# Patient Record
Sex: FEMALE | Race: WHITE | Employment: OTHER | URBAN - METROPOLITAN AREA
[De-identification: names, ages, dates, MRNs, and addresses within clinical notes are randomized per-mention and may not be internally consistent; named-entity substitution may affect disease eponyms.]

---

## 2017-01-04 ENCOUNTER — HOSPITAL ENCOUNTER (OUTPATIENT)
Dept: PHYSICAL THERAPY | Age: 67
Discharge: HOME | End: 2017-01-04
Admitting: ORTHOPAEDIC SURGERY

## 2017-01-04 DIAGNOSIS — M17.0 PRIMARY OSTEOARTHRITIS OF BOTH KNEES: ICD-10-CM

## 2017-01-11 ENCOUNTER — HOSPITAL ENCOUNTER (OUTPATIENT)
Dept: PHYSICAL THERAPY | Age: 67
Discharge: HOME | End: 2017-01-11
Admitting: ORTHOPAEDIC SURGERY

## 2017-01-11 DIAGNOSIS — M17.0 PRIMARY OSTEOARTHRITIS OF BOTH KNEES: ICD-10-CM

## 2017-01-17 ENCOUNTER — HOSPITAL ENCOUNTER (OUTPATIENT)
Dept: PHYSICAL THERAPY | Age: 67
Discharge: HOME | End: 2017-01-17
Admitting: ORTHOPAEDIC SURGERY

## 2017-01-17 DIAGNOSIS — M17.0 PRIMARY OSTEOARTHRITIS OF BOTH KNEES: ICD-10-CM

## 2017-01-26 ENCOUNTER — HOSPITAL ENCOUNTER (OUTPATIENT)
Dept: PHYSICAL THERAPY | Age: 67
Discharge: HOME | End: 2017-01-26
Admitting: ORTHOPAEDIC SURGERY

## 2017-01-26 DIAGNOSIS — M17.0 PRIMARY OSTEOARTHRITIS OF BOTH KNEES: ICD-10-CM

## 2017-01-31 ENCOUNTER — HOSPITAL ENCOUNTER (OUTPATIENT)
Dept: PHYSICAL THERAPY | Age: 67
Discharge: HOME | End: 2017-01-31
Admitting: ORTHOPAEDIC SURGERY

## 2017-01-31 DIAGNOSIS — M17.0 PRIMARY OSTEOARTHRITIS OF BOTH KNEES: ICD-10-CM

## 2017-03-27 ENCOUNTER — OFFICE VISIT (OUTPATIENT)
Dept: FAMILY MEDICINE CLINIC | Age: 67
End: 2017-03-27

## 2017-03-27 VITALS
TEMPERATURE: 98.3 F | DIASTOLIC BLOOD PRESSURE: 89 MMHG | WEIGHT: 211 LBS | HEART RATE: 70 BPM | RESPIRATION RATE: 16 BRPM | SYSTOLIC BLOOD PRESSURE: 132 MMHG | BODY MASS INDEX: 36.79 KG/M2

## 2017-03-27 DIAGNOSIS — F32.89 OTHER DEPRESSION: ICD-10-CM

## 2017-03-27 DIAGNOSIS — R53.82 CHRONIC FATIGUE: ICD-10-CM

## 2017-03-27 DIAGNOSIS — R06.83 SNORING: ICD-10-CM

## 2017-03-27 DIAGNOSIS — M79.7 FIBROMYALGIA: Primary | ICD-10-CM

## 2017-03-27 PROCEDURE — 99213 OFFICE O/P EST LOW 20 MIN: CPT | Performed by: FAMILY MEDICINE

## 2017-03-27 PROCEDURE — G8510 SCR DEP NEG, NO PLAN REQD: HCPCS | Performed by: FAMILY MEDICINE

## 2017-03-27 RX ORDER — DULOXETIN HYDROCHLORIDE 20 MG/1
20 CAPSULE, DELAYED RELEASE ORAL 2 TIMES DAILY
Qty: 180 CAPSULE | Refills: 1 | Status: SHIPPED | OUTPATIENT
Start: 2017-03-27 | End: 2018-03-08 | Stop reason: SDUPTHER

## 2017-03-27 RX ORDER — FLUTICASONE PROPIONATE 50 MCG
2 SPRAY, SUSPENSION (ML) NASAL DAILY
Qty: 3 BOTTLE | Refills: 3 | Status: SHIPPED | OUTPATIENT
Start: 2017-03-27 | End: 2018-03-08 | Stop reason: SDUPTHER

## 2017-03-27 RX ORDER — PREGABALIN 75 MG/1
75 CAPSULE ORAL 3 TIMES DAILY
Qty: 270 CAPSULE | Refills: 1 | Status: SHIPPED | OUTPATIENT
Start: 2017-03-27 | End: 2017-10-04 | Stop reason: SDUPTHER

## 2017-03-27 RX ORDER — OMEPRAZOLE 40 MG/1
CAPSULE, DELAYED RELEASE ORAL
Qty: 90 CAPSULE | Refills: 3 | Status: SHIPPED | OUTPATIENT
Start: 2017-03-27 | End: 2018-03-08 | Stop reason: SDUPTHER

## 2017-03-27 ASSESSMENT — ENCOUNTER SYMPTOMS
WHEEZING: 0
SHORTNESS OF BREATH: 0
COUGH: 0

## 2017-03-27 ASSESSMENT — PATIENT HEALTH QUESTIONNAIRE - PHQ9
1. LITTLE INTEREST OR PLEASURE IN DOING THINGS: 0
SUM OF ALL RESPONSES TO PHQ QUESTIONS 1-9: 0
2. FEELING DOWN, DEPRESSED OR HOPELESS: 0
SUM OF ALL RESPONSES TO PHQ9 QUESTIONS 1 & 2: 0

## 2017-03-28 ENCOUNTER — HOSPITAL ENCOUNTER (OUTPATIENT)
Dept: MAMMOGRAPHY | Age: 67
Discharge: HOME | End: 2017-03-28
Attending: FAMILY MEDICINE | Admitting: FAMILY MEDICINE

## 2017-03-28 DIAGNOSIS — Z12.31 ENCOUNTER FOR SCREENING MAMMOGRAM FOR BREAST CANCER: ICD-10-CM

## 2017-04-03 ENCOUNTER — TELEPHONE (OUTPATIENT)
Dept: FAMILY MEDICINE CLINIC | Age: 67
End: 2017-04-03

## 2017-04-04 DIAGNOSIS — L57.3 POIKILODERMA OF CIVATTE: Primary | ICD-10-CM

## 2017-04-27 ENCOUNTER — OFFICE VISIT (OUTPATIENT)
Dept: ORTHOPEDIC SURGERY | Age: 67
End: 2017-04-27

## 2017-04-27 ENCOUNTER — HOSPITAL ENCOUNTER (OUTPATIENT)
Dept: PHYSICAL THERAPY | Age: 67
Discharge: HOME | End: 2017-04-30
Admitting: ORTHOPAEDIC SURGERY

## 2017-04-27 VITALS
DIASTOLIC BLOOD PRESSURE: 80 MMHG | BODY MASS INDEX: 36.02 KG/M2 | WEIGHT: 211 LBS | HEART RATE: 78 BPM | HEIGHT: 64 IN | SYSTOLIC BLOOD PRESSURE: 136 MMHG

## 2017-04-27 DIAGNOSIS — Z96.651 STATUS POST TOTAL RIGHT KNEE REPLACEMENT: Primary | ICD-10-CM

## 2017-04-27 DIAGNOSIS — M17.0 PRIMARY OSTEOARTHRITIS OF BOTH KNEES: ICD-10-CM

## 2017-04-27 PROCEDURE — 99213 OFFICE O/P EST LOW 20 MIN: CPT | Performed by: ORTHOPAEDIC SURGERY

## 2017-07-19 ENCOUNTER — INITIAL CONSULT (OUTPATIENT)
Dept: PULMONOLOGY | Age: 67
End: 2017-07-19

## 2017-07-19 VITALS
OXYGEN SATURATION: 97 % | HEIGHT: 63 IN | BODY MASS INDEX: 37.39 KG/M2 | SYSTOLIC BLOOD PRESSURE: 118 MMHG | DIASTOLIC BLOOD PRESSURE: 82 MMHG | HEART RATE: 45 BPM | WEIGHT: 211 LBS

## 2017-07-19 DIAGNOSIS — E66.9 NON MORBID OBESITY, UNSPECIFIED OBESITY TYPE: Chronic | ICD-10-CM

## 2017-07-19 DIAGNOSIS — M79.7 FIBROMYALGIA: Chronic | ICD-10-CM

## 2017-07-19 DIAGNOSIS — F32.89 OTHER DEPRESSION: Chronic | ICD-10-CM

## 2017-07-19 DIAGNOSIS — J30.2 SEASONAL ALLERGIC RHINITIS, UNSPECIFIED ALLERGIC RHINITIS TRIGGER: Chronic | ICD-10-CM

## 2017-07-19 DIAGNOSIS — R06.83 SNORING: ICD-10-CM

## 2017-07-19 DIAGNOSIS — G47.10 HYPERSOMNIA: Primary | ICD-10-CM

## 2017-07-19 PROCEDURE — 99204 OFFICE O/P NEW MOD 45 MIN: CPT | Performed by: INTERNAL MEDICINE

## 2017-07-19 ASSESSMENT — ENCOUNTER SYMPTOMS
SHORTNESS OF BREATH: 0
APNEA: 1
ALLERGIC/IMMUNOLOGIC NEGATIVE: 1
CHEST TIGHTNESS: 0
ABDOMINAL PAIN: 0
EYE PAIN: 0
ABDOMINAL DISTENTION: 0
PHOTOPHOBIA: 0
NAUSEA: 0
RHINORRHEA: 0
CHOKING: 0
VOMITING: 0

## 2017-07-19 ASSESSMENT — SLEEP AND FATIGUE QUESTIONNAIRES
HOW LIKELY ARE YOU TO NOD OFF OR FALL ASLEEP WHILE SITTING INACTIVE IN A PUBLIC PLACE: 0
HOW LIKELY ARE YOU TO NOD OFF OR FALL ASLEEP WHEN YOU ARE A PASSENGER IN A CAR FOR AN HOUR WITHOUT A BREAK: 0
HOW LIKELY ARE YOU TO NOD OFF OR FALL ASLEEP WHILE LYING DOWN TO REST IN THE AFTERNOON WHEN CIRCUMSTANCES PERMIT: 2
HOW LIKELY ARE YOU TO NOD OFF OR FALL ASLEEP WHILE SITTING QUIETLY AFTER LUNCH WITHOUT ALCOHOL: 2
NECK CIRCUMFERENCE (INCHES): 15
HOW LIKELY ARE YOU TO NOD OFF OR FALL ASLEEP WHILE SITTING AND TALKING TO SOMEONE: 0
ESS TOTAL SCORE: 7
HOW LIKELY ARE YOU TO NOD OFF OR FALL ASLEEP IN A CAR, WHILE STOPPED FOR A FEW MINUTES IN TRAFFIC: 0
HOW LIKELY ARE YOU TO NOD OFF OR FALL ASLEEP WHILE WATCHING TV: 2
HOW LIKELY ARE YOU TO NOD OFF OR FALL ASLEEP WHILE SITTING AND READING: 1

## 2017-08-09 ENCOUNTER — HOSPITAL ENCOUNTER (OUTPATIENT)
Dept: SLEEP MEDICINE | Age: 67
Discharge: HOME | End: 2017-08-11

## 2017-08-09 ENCOUNTER — TELEPHONE (OUTPATIENT)
Dept: FAMILY MEDICINE CLINIC | Age: 67
End: 2017-08-09

## 2017-08-09 DIAGNOSIS — R06.83 SNORING: ICD-10-CM

## 2017-08-09 DIAGNOSIS — G47.10 HYPERSOMNIA: ICD-10-CM

## 2017-08-09 PROCEDURE — 95810 POLYSOM 6/> YRS 4/> PARAM: CPT | Performed by: INTERNAL MEDICINE

## 2017-08-14 ENCOUNTER — OFFICE VISIT (OUTPATIENT)
Dept: FAMILY MEDICINE CLINIC | Age: 67
End: 2017-08-14

## 2017-08-14 VITALS
DIASTOLIC BLOOD PRESSURE: 88 MMHG | HEART RATE: 74 BPM | TEMPERATURE: 98.3 F | SYSTOLIC BLOOD PRESSURE: 142 MMHG | WEIGHT: 210 LBS | BODY MASS INDEX: 37.8 KG/M2 | RESPIRATION RATE: 16 BRPM

## 2017-08-14 DIAGNOSIS — M79.7 FIBROMYALGIA: ICD-10-CM

## 2017-08-14 DIAGNOSIS — M17.12 PRIMARY OSTEOARTHRITIS OF LEFT KNEE: Primary | ICD-10-CM

## 2017-08-14 PROCEDURE — 99213 OFFICE O/P EST LOW 20 MIN: CPT | Performed by: FAMILY MEDICINE

## 2017-08-14 ASSESSMENT — ENCOUNTER SYMPTOMS
COUGH: 0
SHORTNESS OF BREATH: 0
WHEEZING: 0

## 2017-08-15 ENCOUNTER — TELEPHONE (OUTPATIENT)
Dept: SLEEP MEDICINE | Age: 67
End: 2017-08-15

## 2017-08-21 ENCOUNTER — TELEPHONE (OUTPATIENT)
Dept: SLEEP MEDICINE | Age: 67
End: 2017-08-21

## 2017-08-21 DIAGNOSIS — G47.33 OBSTRUCTIVE SLEEP APNEA (ADULT) (PEDIATRIC): Primary | ICD-10-CM

## 2017-08-24 ENCOUNTER — OFFICE VISIT (OUTPATIENT)
Dept: ORTHOPEDIC SURGERY | Age: 67
End: 2017-08-24

## 2017-08-24 VITALS
DIASTOLIC BLOOD PRESSURE: 82 MMHG | BODY MASS INDEX: 35.44 KG/M2 | WEIGHT: 200 LBS | HEIGHT: 63 IN | HEART RATE: 82 BPM | SYSTOLIC BLOOD PRESSURE: 122 MMHG

## 2017-08-24 DIAGNOSIS — M25.562 LEFT KNEE PAIN, UNSPECIFIED CHRONICITY: Primary | ICD-10-CM

## 2017-08-24 DIAGNOSIS — M17.12 PRIMARY OSTEOARTHRITIS OF LEFT KNEE: ICD-10-CM

## 2017-08-24 DIAGNOSIS — Z96.651 HISTORY OF TOTAL KNEE REPLACEMENT, RIGHT: ICD-10-CM

## 2017-08-24 PROCEDURE — 99214 OFFICE O/P EST MOD 30 MIN: CPT | Performed by: ORTHOPAEDIC SURGERY

## 2017-08-24 PROCEDURE — 73562 X-RAY EXAM OF KNEE 3: CPT | Performed by: ORTHOPAEDIC SURGERY

## 2017-08-29 ENCOUNTER — TELEPHONE (OUTPATIENT)
Dept: SLEEP MEDICINE | Age: 67
End: 2017-08-29

## 2017-09-05 ENCOUNTER — TELEPHONE (OUTPATIENT)
Dept: SLEEP MEDICINE | Age: 67
End: 2017-09-05

## 2017-09-05 ENCOUNTER — HOSPITAL ENCOUNTER (OUTPATIENT)
Dept: MRI IMAGING | Age: 67
Discharge: HOME | End: 2017-09-05
Attending: ORTHOPAEDIC SURGERY | Admitting: ORTHOPAEDIC SURGERY

## 2017-09-05 DIAGNOSIS — M17.12 PRIMARY OSTEOARTHRITIS OF LEFT KNEE: ICD-10-CM

## 2017-09-05 DIAGNOSIS — M17.12 ARTHRITIS OF KNEE, LEFT: ICD-10-CM

## 2017-09-11 ENCOUNTER — OFFICE VISIT (OUTPATIENT)
Dept: FAMILY MEDICINE CLINIC | Age: 67
End: 2017-09-11

## 2017-09-11 VITALS
WEIGHT: 213.2 LBS | DIASTOLIC BLOOD PRESSURE: 79 MMHG | RESPIRATION RATE: 16 BRPM | OXYGEN SATURATION: 98 % | HEIGHT: 63 IN | BODY MASS INDEX: 37.78 KG/M2 | TEMPERATURE: 98.1 F | HEART RATE: 74 BPM | SYSTOLIC BLOOD PRESSURE: 118 MMHG

## 2017-09-11 DIAGNOSIS — Z01.818 PREOP EXAMINATION: ICD-10-CM

## 2017-09-11 DIAGNOSIS — L03.112 CELLULITIS OF LEFT AXILLA: ICD-10-CM

## 2017-09-11 DIAGNOSIS — M25.562 CHRONIC PAIN OF LEFT KNEE: Primary | ICD-10-CM

## 2017-09-11 DIAGNOSIS — G89.29 CHRONIC PAIN OF LEFT KNEE: Primary | ICD-10-CM

## 2017-09-11 LAB
A/G RATIO: 1.6 (ref 1.1–2.2)
ALBUMIN SERPL-MCNC: 4.2 G/DL (ref 3.4–5)
ALP BLD-CCNC: 71 U/L (ref 40–129)
ALT SERPL-CCNC: 21 U/L (ref 10–40)
ANION GAP SERPL CALCULATED.3IONS-SCNC: 17 MMOL/L (ref 3–16)
AST SERPL-CCNC: 26 U/L (ref 15–37)
BASOPHILS ABSOLUTE: 0.1 K/UL (ref 0–0.2)
BASOPHILS RELATIVE PERCENT: 1.2 %
BILIRUB SERPL-MCNC: 0.5 MG/DL (ref 0–1)
BUN BLDV-MCNC: 14 MG/DL (ref 7–20)
CALCIUM SERPL-MCNC: 9.7 MG/DL (ref 8.3–10.6)
CHLORIDE BLD-SCNC: 104 MMOL/L (ref 99–110)
CO2: 20 MMOL/L (ref 21–32)
CREAT SERPL-MCNC: 0.6 MG/DL (ref 0.6–1.2)
EOSINOPHILS ABSOLUTE: 0.2 K/UL (ref 0–0.6)
EOSINOPHILS RELATIVE PERCENT: 3.7 %
GFR AFRICAN AMERICAN: >60
GFR NON-AFRICAN AMERICAN: >60
GLOBULIN: 2.7 G/DL
GLUCOSE BLD-MCNC: 96 MG/DL (ref 70–99)
HCT VFR BLD CALC: 41.6 % (ref 36–48)
HEMOGLOBIN: 13.5 G/DL (ref 12–16)
LYMPHOCYTES ABSOLUTE: 1.6 K/UL (ref 1–5.1)
LYMPHOCYTES RELATIVE PERCENT: 29.2 %
MCH RBC QN AUTO: 28.6 PG (ref 26–34)
MCHC RBC AUTO-ENTMCNC: 32.3 G/DL (ref 31–36)
MCV RBC AUTO: 88.4 FL (ref 80–100)
MONOCYTES ABSOLUTE: 0.5 K/UL (ref 0–1.3)
MONOCYTES RELATIVE PERCENT: 9.3 %
NEUTROPHILS ABSOLUTE: 3 K/UL (ref 1.7–7.7)
NEUTROPHILS RELATIVE PERCENT: 56.6 %
PDW BLD-RTO: 14.6 % (ref 12.4–15.4)
PLATELET # BLD: 277 K/UL (ref 135–450)
PMV BLD AUTO: 8.9 FL (ref 5–10.5)
POTASSIUM SERPL-SCNC: 5.3 MMOL/L (ref 3.5–5.1)
RBC # BLD: 4.71 M/UL (ref 4–5.2)
SODIUM BLD-SCNC: 141 MMOL/L (ref 136–145)
TOTAL PROTEIN: 6.9 G/DL (ref 6.4–8.2)
WBC # BLD: 5.3 K/UL (ref 4–11)

## 2017-09-11 PROCEDURE — 99214 OFFICE O/P EST MOD 30 MIN: CPT | Performed by: NURSE PRACTITIONER

## 2017-09-11 PROCEDURE — 93000 ELECTROCARDIOGRAM COMPLETE: CPT | Performed by: NURSE PRACTITIONER

## 2017-09-11 RX ORDER — SULFAMETHOXAZOLE AND TRIMETHOPRIM 800; 160 MG/1; MG/1
1 TABLET ORAL 2 TIMES DAILY
Qty: 20 TABLET | Refills: 0 | Status: SHIPPED | OUTPATIENT
Start: 2017-09-11 | End: 2017-09-21

## 2017-09-11 ASSESSMENT — ENCOUNTER SYMPTOMS
GASTROINTESTINAL NEGATIVE: 1
NAUSEA: 0
RESPIRATORY NEGATIVE: 1
EYES NEGATIVE: 1
DIARRHEA: 0
TROUBLE SWALLOWING: 0
COUGH: 0
COLOR CHANGE: 1
WHEEZING: 0
EYE ITCHING: 0
ABDOMINAL PAIN: 0
CHEST TIGHTNESS: 0
CONSTIPATION: 0
SINUS PRESSURE: 0
SHORTNESS OF BREATH: 0
EYE REDNESS: 0
SORE THROAT: 0

## 2017-09-13 DIAGNOSIS — M17.12 PRIMARY OSTEOARTHRITIS OF LEFT KNEE: Primary | ICD-10-CM

## 2017-09-21 ENCOUNTER — HOSPITAL ENCOUNTER (OUTPATIENT)
Dept: SLEEP MEDICINE | Age: 67
Discharge: HOME | End: 2017-08-26

## 2017-09-22 ENCOUNTER — TELEPHONE (OUTPATIENT)
Dept: ORTHOPEDIC SURGERY | Age: 67
End: 2017-09-22

## 2017-10-02 ENCOUNTER — HOSPITAL ENCOUNTER (OUTPATIENT)
Dept: PREADMISSION TESTING | Age: 67
Discharge: HOME | End: 2017-10-02
Admitting: ORTHOPAEDIC SURGERY

## 2017-10-02 VITALS
SYSTOLIC BLOOD PRESSURE: 128 MMHG | DIASTOLIC BLOOD PRESSURE: 83 MMHG | HEART RATE: 69 BPM | WEIGHT: 219 LBS | OXYGEN SATURATION: 98 % | RESPIRATION RATE: 16 BRPM | BODY MASS INDEX: 38.8 KG/M2 | TEMPERATURE: 97.3 F | HEIGHT: 63 IN

## 2017-10-02 LAB
ALBUMIN SERPL-MCNC: 3.8 G/DL (ref 3.4–5)
ALP BLD-CCNC: 78 U/L (ref 40–129)
ALT SERPL-CCNC: 24 U/L (ref 10–40)
ANION GAP SERPL CALCULATED.3IONS-SCNC: 15 MMOL/L (ref 3–16)
APTT: 29.5 SEC (ref 24.1–34.9)
AST SERPL-CCNC: 21 U/L (ref 15–37)
BILIRUB SERPL-MCNC: <0.2 MG/DL (ref 0–1)
BILIRUBIN DIRECT: <0.2 MG/DL (ref 0–0.3)
BILIRUBIN, INDIRECT: NORMAL MG/DL (ref 0–1)
BUN BLDV-MCNC: 13 MG/DL (ref 7–20)
CALCIUM SERPL-MCNC: 9.1 MG/DL (ref 8.3–10.6)
CHLORIDE BLD-SCNC: 102 MMOL/L (ref 99–110)
CO2: 22 MMOL/L (ref 21–32)
CREAT SERPL-MCNC: 0.6 MG/DL (ref 0.6–1.2)
GFR AFRICAN AMERICAN: >60
GFR NON-AFRICAN AMERICAN: >60
GLUCOSE BLD-MCNC: 105 MG/DL (ref 70–99)
HCT VFR BLD CALC: 38.3 % (ref 36–48)
HEMOGLOBIN: 12.6 G/DL (ref 12–16)
INR BLD: 0.91 (ref 0.85–1.15)
MCH RBC QN AUTO: 28.3 PG (ref 26–34)
MCHC RBC AUTO-ENTMCNC: 32.8 G/DL (ref 31–36)
MCV RBC AUTO: 86.3 FL (ref 80–100)
PDW BLD-RTO: 14.2 % (ref 12.4–15.4)
PLATELET # BLD: 292 K/UL (ref 135–450)
PMV BLD AUTO: 8.7 FL (ref 5–10.5)
POTASSIUM SERPL-SCNC: 4.3 MMOL/L (ref 3.5–5.1)
PROTHROMBIN TIME: 10.3 SEC (ref 9.6–13)
RBC # BLD: 4.44 M/UL (ref 4–5.2)
SODIUM BLD-SCNC: 139 MMOL/L (ref 136–145)
TOTAL PROTEIN: 6.7 G/DL (ref 6.4–8.2)
WBC # BLD: 4.7 K/UL (ref 4–11)

## 2017-10-02 RX ORDER — CHLORHEXIDINE GLUCONATE 0.12 MG/ML
15 RINSE ORAL 2 TIMES DAILY
Status: CANCELLED | OUTPATIENT
Start: 2017-10-08

## 2017-10-02 NOTE — PROGRESS NOTES
[address]                          Date of Procedure 10-9 Time of Procedure 0800    PRIOR TO PROCEDURE DATE:  1. Please follow any guidelines/instructions prior to your procedure as advised by your surgeon. 2. Arrange for someone to drive you home and be with you for the first 24 hours after discharge for your safety after your procedure for which you received sedation. Ensure it is someone we can share information with regarding your discharge. 3. You must contact your surgeon for instructions IF:   You are taking any blood thinners, aspirin, anti-inflammatory or vitamin E.   There is a change in your physical condition such as a cold, fever, rash, cuts, sores or any other infection, especially near your surgical site. 4. Do not drink alcohol the day before or day of your procedure. 5. A Pre-op History and Physical for surgery MUST be completed by your Physician or Urgent Care within 30 days of your procedure date. Please bring a copy with you on the day of your procedure and along with any other testing performed. THE DAY OF YOUR PROCEDURE:  1. Follow instructions for ARRIVAL TIME as DIRECTED BY YOUR SURGEON. If your surgeon does not give you a specific arrival time, please arrive at 0600     2. Enter the MAIN entrance from [employer] and follow the signs to the free BPT or [employer] parking (offered free of charge 6am-5pm). 3. Enter the Main Entrance of the hospital (do NOT enter from the lower level of the parking garage). Upon entrance, check in with the  at the main desk on your left. If no one is available at the desk, proceed into the [facility] Waiting Room and go through the door directly into the [facility]. There is a Check-in desk ACROSS from Room 5 (marked with a sign hanging from the ceiling).  The phone number for the surgery center is [phone].    4. DO NOT EAT OR DRINK ANYTHING AFTER MIDNIGHT occur after anesthesia. Should any of these symptoms become severe, or should you notice any signs of infection, you should call your surgeon. 5. Narcotic pain medications can cause significant constipation. You may want to add a stool softener to your postoperative medication schedule or speak to your surgeon on how best to manage this SIDE EFFECT. SPECIAL INSTRUCTIONS     Thank you for allowing us to care for you. We strive to exceed your expectations in the overall delivery of care and service provided to you and your family. If you need to contact us for any reason, please call us at [phone]    Instructions reviewed and copy given to patient during preadmission testing visit. Nida Pelaez. 10/2/2017 .10:26 AM      ADDITIONAL EDUCATIONAL INFORMATION REVIEWED / PROVIDED TO YOU AND YOUR FAMILY:  Yes Taking Control of Your Pain   Yes FAQs about Surgical Site Infections  No Cardiac Surgery Instructions for AM admission to the hospital  No Learning About Preventing Pressure Sores  No Cardiac Surgery Preoperative Hibiclens® Bathing Instructions  No Your Care after Heart Surgery Binder  no Your Guide to Hip Replacement Surgery. PLEASE BRING THIS BOOKLET BACK ON THE DAY OF YOUR SURGERY. Yes Your Guide to Knee Replacement Surgery. PLEASE BRING THIS BOOKLET BACK ON THE DAY OF YOUR SURGERY. No Your Guide to Shoulder Replacement Surgery. PLEASE BRING THIS BOOKLET BACK ON THE DAY OF YOUR SURGERY.     Yes Hibiclens® Bathing Instructions   Yes Incentive Spirometer given to patient- PLEASE BRING THIS SPIROMETER BACK WITH YOU ON THE DAY OF YOUR SURGERY  No CMS Comprehensive Care for Joint Replacement Model Notification Letter  No Other

## 2017-10-03 LAB — MRSA SCREEN RT-PCR: NORMAL

## 2017-10-04 ENCOUNTER — TELEPHONE (OUTPATIENT)
Dept: FAMILY MEDICINE CLINIC | Age: 67
End: 2017-10-04

## 2017-10-04 RX ORDER — PREGABALIN 75 MG/1
75 CAPSULE ORAL 3 TIMES DAILY
Qty: 270 CAPSULE | Refills: 1 | Status: ON HOLD | OUTPATIENT
Start: 2017-10-04 | End: 2017-10-09 | Stop reason: SDUPTHER

## 2017-10-05 ENCOUNTER — HOSPITAL ENCOUNTER (OUTPATIENT)
Dept: PHYSICAL THERAPY | Age: 67
Discharge: HOME | End: 2017-10-31
Admitting: ORTHOPAEDIC SURGERY

## 2017-10-05 ENCOUNTER — OFFICE VISIT (OUTPATIENT)
Dept: ORTHOPEDIC SURGERY | Age: 67
End: 2017-10-05

## 2017-10-05 VITALS
BODY MASS INDEX: 40.3 KG/M2 | HEIGHT: 62 IN | WEIGHT: 219 LBS | HEART RATE: 71 BPM | SYSTOLIC BLOOD PRESSURE: 147 MMHG | DIASTOLIC BLOOD PRESSURE: 89 MMHG

## 2017-10-05 DIAGNOSIS — M17.12 PRIMARY OSTEOARTHRITIS OF LEFT KNEE: ICD-10-CM

## 2017-10-05 DIAGNOSIS — M17.12 OSTEOARTHRITIS OF LEFT KNEE, UNSPECIFIED OSTEOARTHRITIS TYPE: Primary | ICD-10-CM

## 2017-10-05 PROCEDURE — 99213 OFFICE O/P EST LOW 20 MIN: CPT | Performed by: ORTHOPAEDIC SURGERY

## 2017-10-05 PROCEDURE — MISC250 COMPRESSION STOCKING: Performed by: ORTHOPAEDIC SURGERY

## 2017-10-05 RX ORDER — HYDROCODONE BITARTRATE AND ACETAMINOPHEN 5; 325 MG/1; MG/1
2 TABLET ORAL EVERY 6 HOURS PRN
Qty: 40 TABLET | Refills: 0 | Status: SHIPPED | OUTPATIENT
Start: 2017-10-05 | End: 2017-10-12

## 2017-10-05 NOTE — PLAN OF CARE
[facility]                                                       Physical Therapy Certification    Dear Johan Powell MD,    We had the pleasure of evaluating the following patient for physical therapy services at [address] [place] [address]. A summary of our findings can be found in the initial assessment below. This includes our plan of care. If you have any questions or concerns regarding these findings, please do not hesitate to contact me at the office phone number checked above. Thank you for the referral.       Physician Signature:_______________________________Date:__________________  By signing above (or electronic signature), therapists plan is approved by physician      Patient: Margie Dey   : 1950   MRN: [id number]  Referring Physician: Referring Practitioner: Johan Powell MD      Evaluation Date: 10/5/2017      Medical Diagnosis Information:  Diagnosis: M17.12 (ICD-10-CM) - Primary osteoarthritis of left knee   Treatment Diagnosis: Knee pain/ swelling/ difficulty walking/ limited ROM/ LE muscle weakness  Preop for L knee TKR  DOS: 10/9/17                                           Insurance information: PT Insurance Information: [employer]     Precautions/ Contra-indications: Fibromyalgia; R TKR 2016; sleep apnea (using CPAP)  Latex Allergy:  [x]NO      []YES  Preferred Language for Healthcare:   [x]English       []other:    SUBJECTIVE: Margie Dey is a 74 y.o. female who presents for preop assessment  of her left knee. The patient does have primary*arthritis of the left knee and has failed all conservative treatment protocol including physical therapy, oral anti-inflammatories, injections and modifying her activities. She is currently scheduled to undergo a left total knee arthroplasty. The patient is in good health.  The patient has [x]Dermatomes/Myotomes intact    [x]Reflexes equal and normal bilaterally   []Other:    Joint mobility:    [x]Normal    []Hypo   []Hyper    Palpation: MJL    Functional Mobility/Transfers: Independent from floor to table    Posture: Mild varus; prior L short (had used heel lift short term before changing shoe style)    Bandages/Dressings/Incisions: L knee Ascope portals healed    Gait: Preop FWB with mild antalgic gait due to medial compartment pain; slight decreased stance phase due to pain    TEST INITIAL  10-5-16 FOLLOW  UP GOAL   SINGLE LEG SQUAT TEST L: Poor (-)    R: Poor (-)  NO KNEE VALGUS, MEDIAL/LATERAL MOVEMENT, OR PELVIC TILT  GOOD, FAIR, POOR   6 MINUTE WALK TEST 0.15 miles                      M             F          60-70 y/o: >.31mile,  >.30mile  66-78 y/o: >.28 mile, >.26 mile  80-81 y/o: >.21mile,  >.20 mile   STAIR CLIMBING TEST 12.24 sec  < 13 SEC (M&F)     Score Sheet for Y Balance Test  10-5-17   Left  Right  Difference  Comments    Anterior  48 60 12    Posteromedial  96 92 +4    Posterolateral  80 80 0           Leg Length        **Difference should be less than 4 cm for return to sport and preparticipation screening (<10% deficit compared to uninvolved)**  (Star Excursion Balance Test as a Predictor of Lower Extremity Injury in [employer] Players)                           [x] Patient history, allergies, meds reviewed. Medical chart reviewed. See intake form. Review Of Systems (ROS):  [x]Performed Review of systems (Integumentary, CardioPulmonary, Neurological) by intake and observation. Intake form has been scanned into medical record. Patient has been instructed to contact their primary care physician regarding ROS issues if not already being addressed at this time.       Co-morbidities/Complexities (which will affect course of rehabilitation):   []None           Arthritic conditions   []Rheumatoid arthritis (M05.9)  [x]Osteoarthritis (M19.91)   Cardiovascular conditions []Hypertension (I10)  []Hyperlipidemia (E78.5)  []Angina pectoris (I20)  []Atherosclerosis (I70)   Musculoskeletal conditions   []Disc pathology   []Congenital spine pathologies   [x]Prior surgical intervention  []Osteoporosis (M81.8)  []Osteopenia (M85.8)   Endocrine conditions   []Hypothyroid (E03.9)  []Hyperthyroid Gastrointestinal conditions   []Constipation (V12.77)   Metabolic conditions   []Morbid obesity (E66.01)  []Diabetes type 1(E10.65) or 2 (E11.65)   []Neuropathy (G60.9)     Pulmonary conditions   []Asthma (J45)  []Coughing   []COPD (J44.9)   Psychological Disorders  [x]Anxiety (F41.9)  [x]Depression (F32.9)   []Other:   [x]Other:    *Overweight  *Fibromyalgia        Barriers to/and or personal factors that will affect rehab potential:              []Age  []Sex              []Motivation/Lack of Motivation                        [x]Co-Morbidities              []Cognitive Function, education/learning barriers              []Environmental, home barriers              []profession/work barriers  []past PT/medical experience  []other:  Justification: Significant issues related to complex rehab process after L knee TKR. Continues with R knee progression    Falls Risk Assessment (30 days):   [x] Falls Risk assessed and no intervention required.   [] Falls Risk assessed and Patient requires intervention due to being higher risk   TUG score (>12s at risk):     [] Falls education provided, including         ASSESSMENT:   Functional Impairments:     [x]Noted lumbar/proximal hip/LE hypomobility   [x]Decreased LE functional ROM   [x]Decreased core/proximal hip strength and neuromuscular control   [x]Decreased LE functional strength   [x]Reduced balance/proprioceptive control   []other:      Functional Activity Limitations (from functional questionnaire and intake)   [x]Reduced ability to tolerate prolonged functional positions   [x]Reduced ability or difficulty with changes of positions or transfers between positions   [x]Reduced ability to maintain good posture and demonstrate good body mechanics with sitting, bending, and lifting   []Reduced ability to sleep   [] Reduced ability or tolerance with driving and/or computer work   [x]Reduced ability to perform lifting, carrying tasks   [x]Reduced ability to squat   [x]Reduced ability to forward bend   [x]Reduced ability to ambulate prolonged functional periods/distances/surfaces   [x]Reduced ability to ascend/descend stairs   [x]Reduced ability to run, hop or jump   []other:     Participation Restrictions   []Reduced participation in self care activities   [x]Reduced participation in home management activities   [x]Reduced participation in work activities   [x]Reduced participation in social activities. [x]Reduced participation in sport activities. Classification :    [x]Signs/symptoms consistent with post-surgical status including decreased ROM, strength and function. []Signs/symptoms consistent with joint sprain/strain   [x]Signs/symptoms consistent with patella-femoral syndrome   [x]Signs/symptoms consistent with knee OA/hip OA   []Signs/symptoms consistent with internal derangement of knee/Hip   [x]Signs/symptoms consistent with functional hip weakness/NMR control      []Signs/symptoms consistent with tendinitis/tendinosis    []signs/symptoms consistent with pathology which may benefit from Dry needling      []other:      Prognosis/Rehab Potential:      []Excellent   [x]Good    []Fair   []Poor    Tolerance of evaluation/treatment:    []Excellent   [x]Good    [x]Fair: Good tolerance to initial discussion/ table measurements, however, noted moderate functional limitations with WB flexion testing. Functional ADL limitations include, but not limited to knee pain/ swelling/ difficulty walking/ limited ROM/ LE muscle weakness. Full discussion and written instructions provided for preop appointment.    []Poor    Physical Therapy Evaluation Complexity

## 2017-10-05 NOTE — MR AVS SNAPSHOT
guidelines. However these guidelines can be individualized by your provider. 3/28/2019    Cholesterol Screening 4/3/2020            MyChart Signup           Our records indicate that you have an active [employer] account. You can view your After Visit Summary by going to [email or web address] and logging in with your [employer] username and password. If you don't have a [employer] username and password but a parent or guardian has access to your record, the parent or guardian should login with their own [employer] username and password and access your record to view the After Visit Summary. Additional Information  If you have questions, please contact the physician practice where you receive care. Remember, [employer] is NOT to be used for urgent needs. For medical emergencies, dial 911. For questions regarding your [employer] account call [phone]. If you have a clinical question, please call your doctor's office.

## 2017-10-05 NOTE — PROGRESS NOTES
Review of Systems   HENT:        Sinus     Gastrointestinal:        Hemorrhoids  Reflux     Musculoskeletal: Positive for joint pain. Neurological:        Chronic  Fibromyalgia     Psychiatric/Behavioral: Positive for depression. All other systems reviewed and are negative.

## 2017-10-05 NOTE — PROGRESS NOTES
Chief Complaint    Follow-up (surg disc for left TKR sx )      History of Present Illness:  James Preciado is a 74 y.o. female who presents for follow up of her left knee. The patient does have primary*arthritis of the left knee and has failed all conservative treatment protocol including physical therapy, oral anti-inflammatories, injections and modifying her activities. She is currently scheduled to undergo a left total knee arthroplasty. The patient is in good health. The patient has no history of blood clots, no organ dysfunction, not diabetes, no known allergies to medications, tolerates pain medications and is not on any estrogen products. The patient recently had a physical from her PCP and was cleared for surgery and stated being in good health. Past Medical History:   Diagnosis Date    Anxiety     Arthritis     knee    Back pain     Depression     Fibromyalgia     Hemorrhoids     Obesity     PONV (postoperative nausea and vomiting)     Reflux     Sleep apnea     uses CPAP    TMJ syndrome     Vision impairment     glasses        Past Surgical History:   Procedure Laterality Date    BREAST SURGERY      reduction    BROW LIFT Bilateral 11/12/13    FINGER TRIGGER RELEASE  12/9/11    left thumb and cyst    FINGER TRIGGER RELEASE Bilateral     4th finger    FINGER TRIGGER RELEASE Right 7/6/2015    index and long    FINGER TRIGGER RELEASE Left 2/22/16    FOOT SURGERY  1980's    Bilat.  feet Soundra Marker shaved\"    KNEE ARTHROSCOPY  11-    Left knee video arthroscopy, partial medial and lateral meniscectomy    KNEE SURGERY  3/17/2011    Video arthroscopy withpartial medial menisectomy rt. knee    MOUTH SURGERY      OTHER SURGICAL HISTORY Left     dequervains    TOTAL KNEE ARTHROPLASTY Right 11/09/2016    TUBAL LIGATION         Family History   Problem Relation Age of Onset    Cancer Father     Diabetes Sister     Other Sister      CHELSI    Stroke Mother     Cancer Brother kg/m2    General Exam:   Mental Status: The patient is oriented to time, place and person. The patient's mood and affect are appropriate. Neurological: The patient has good coordination. There is no weakness or sensory deficit. Knee Examination:left    Skin:  There are no skin lesions, cellulitis, or extreme edema in the lower extremities. Sensation is grossly intact to light touch bilaterally lower extremity. The patient has warm and well-perfused Bilateral lower extremities with brisk capillary refill. Inspection:  Mild effusion,  no gross deformities, no signs of infection. Palpation: There is patellofemoral crepitus, there is joint line tenderness    Range of Motion:  8-120    Strength: Motor is grossly intact    Special Tests:   No ligament instability     Gait: Non antalgic    Alignment: Varus deformity    Radiology:     X-rays obtained and reviewed in office: none   COMPRESSION STOCKING        Office Procedures:  Orders Placed This Encounter   Procedures    Compression Stocking $30     Patient was supplied a Compression Sleeve. This retail item was supplied to provide functional support and assist in controlling swelling in the lower extremities. Verbal and written instructions for the use of and application of this item were provided. The patient was educated and fit by a healthcare professional with expert knowledge and specialization in brace application. They were instructed to contact the office immediately should the equipment result in increased pain, decreased sensation, increased swelling or worsening of the condition. The patient face sheet has been scanned into the media. Assessment: Patient is a 79-year-old female with severe and advanced left knee tricompartmental history arthritis. Encounter Diagnoses   Name Primary?  Osteoarthritis of left knee, unspecified osteoarthritis type Yes    Primary osteoarthritis of left knee        Treatment Plan:   This time we do feel that she has exhausted all non-surgical treatment options for her left knee. She has failed injections, Hyalgan it acid injections, physical therapy, activity modification and oral medications. We recommend that she have a left total knee arthroplasty. The patient is scheduled for TKR and a discussion and comprehensive presentation of thr RBA including providing extensive information and written material on the surgery, PO course and rehab and patient expectations and compliance. The Web site patient instructional series was reviewed for further info for the patient and family. The PO instruction sheet is provided and details on the DVT program and skin preparation pre op explained. The risks explained included but not limited to infection, pain, swelling, woumd healing, blood clots, bleeding, fibrosis, anesthesia, allergies meds, atrophy, and limitation in knee motion, implant loosening, need for additional surgery, alignment problems. Success rates reviewed including a normal knee cannot be established and there are limitations on activity that a TKR requires including 10% of patients in general having knee pain limits, weakness on chair on stair activity, and in particular that recreational activity may require major limits. The final result cannot be predicted and each patient responds to surgery differently. The patient verbally expressed an understanding and all questions answered. The patient has major arthritic damage to the knee joint with pain limiting daily activities and significant restrictions and limitations effecting the quality of life. All conservative measures have been completed and the patient wishes to undergo TKR. Follow up in: post op.      2:58 PM      Ayse Moy, [address] [place] [address] Physician Assistant    During this examination, Ayse SHERWOOD PA-C, functioned as a scribe for Dr. Vernell Esquivel.      This dictation was performed with a verbal

## 2017-10-05 NOTE — FLOWSHEET NOTE
[facility]  Orthopaedics and Sports Rehabilitation, Alejandrina Barnes    Physical Therapy Daily Treatment Note  Date:  10/5/2017    Patient Name:  James Preciado    :  1950  MRN: [id number]  Restrictions/Precautions:    Medical/Treatment Diagnosis Information:  Diagnosis: M17.12 (ICD-10-CM) - Primary osteoarthritis of left knee  Treatment Diagnosis: Knee pain/ swelling/ difficulty walking/ limited ROM/ LE muscle weakness  Preop for L knee TKR  DOS: 10/9/17  Current PO meds: NA    Insurance/Certification information:  [employer]  Physician Information:   Mehdi Conception, MD  Plan of care signed (Y/N): Y    Date of Patient follow up with Physician: Initial PO appointment for MD/ PT set for 10/12/17    G-Code (if applicable):      Date G-Code Applied:  10/5/17  PT G-Codes  Functional Assessment Tool Used: LEFS  Score: 39/ 80- 49%  Functional Limitation: Mobility: Walking and moving around  Mobility: Walking and Moving Around Current Status (): At least 40 percent but less than 60 percent impaired, limited or restricted  Mobility: Walking and Moving Around Goal Status (): At least 1 percent but less than 20 percent impaired, limited or restricted    Progress Note: [x]  Yes  []  No  Next due by: Visit #10       Latex Allergy:  [x]NO      []YES  Preferred Language for Healthcare:   [x]English       []other:    Visit # Insurance Allowable   1 Medical Necessity     Pain Scale: 4-6 /10  Easing factors: Rest/ ice/ meds  Provocative factors:WB activity: walking> standing; stairs; kneeling; squatting (return> down)   Type: [x]Constant                     []Intermittent                []Radiating                   []Localized                   []other:                            SUBJECTIVE: Arcadio Huynh a 74 y. o. female who presents for preop assessment  of her left knee.  The patient does have primary*arthritis of the left knee and has failed all conservative treatment protocol SQUAT TEST L: Poor (-)     R: Poor (-)   NO KNEE VALGUS, MEDIAL/LATERAL MOVEMENT, OR PELVIC TILT  GOOD, FAIR, POOR   6 MINUTE WALK TEST 0.15 miles                       M             F          60-70 y/o: >.31mile,  >.30mile  66-76 y/o: >.28 mile, >.26 mile  80-79 y/o: >.21mile,  >.20 mile   STAIR CLIMBING TEST 12.24 sec   < 13 SEC (M&F)      Score Sheet for Y Balance Test  10-5-17    Left  Right  Difference  Comments    Anterior  48 60 12     Posteromedial  96 92 +4     Posterolateral  80 80 0                 Leg Length            **Difference should be less than 4 cm for return to sport and preparticipation screening (<10% deficit compared to uninvolved)**  (Star Excursion Balance Test as a Predictor of Lower Extremity Injury in [employer] Players)         RESTRICTIONS/PRECAUTIONS: : Fibromyalgia; R TKR 11/ 2016; sleep apnea (using CPAP)    Exercises/Interventions:   Exercise/Equipment Resistance/Repetitions Other comments 10/5/2017   Stretching      Hamstring      Hip Flexion      ITB      Grion      Quad      Inclined Calf      Towel Pull 30\"x 5 Towel prop x         SLR      Supine      Prone      Abduction      Adducton      SLR+            Isometrics      Quad sets 2x 10\"x 10 A/S x         Patellar Glides      Medial      Superior      Inferior            ROM      Passive      Active      Weight Shift      Hang Weights 10' Propped x   Sheet Pulls      Ankle Pumps 5' Hourly x         CKC      Calf raises      Wall sits      Step ups      1 leg stand      Squatting      CC TKE      Balance            PRE      Extension  RANGE:    Flexion  RANGE:          Cable Column            Leg Press  RANGE:          Bike      Treadmill              Other Therapeutic Activities:   Walker: PWBAT  RAGHAV hose  Cold Therapy    Home Exercise Program:    See above and attached. Initial HEP discussed and completed. Full written, verbal, and demonstration provided.      Patient Education:      Full preop instructions provided. Written and verbal guidelines provided for but not limited to: DME/ HEP/ ICE/ gait/ general medical instructions. Manual Treatments:       Therapeutic Exercise and NMR EXR  [x] (40843) Provided verbal/tactile cueing for activities related to strengthening, flexibility, endurance, ROM for improvements in LE, proximal hip, and core control with self care, mobility, lifting, ambulation. [x] (33099) Provided verbal/tactile cueing for activities related to improving balance, coordination, kinesthetic sense, posture, motor skill, proprioception  to assist with LE, proximal hip, and core control in self care, mobility, lifting, ambulation and eccentric single leg control.      NMR and Therapeutic Activities:    [x] (64441 or 17915) Provided verbal/tactile cueing for activities related to improving balance, coordination, kinesthetic sense, posture, motor skill, proprioception and motor activation to allow for proper function of core, proximal hip and LE with self care and ADLs  [] (26152) Gait Re-education- Provided training and instruction to the patient for proper LE, core and proximal hip recruitment and positioning and eccentric body weight control with ambulation re-education including up and down stairs     Home Exercise Program:    [x] (76201) Reviewed/Progressed HEP activities related to strengthening, flexibility, endurance, ROM of core, proximal hip and LE for functional self-care, mobility, lifting and ambulation/stair navigation   [x] (44235)Reviewed/Progressed HEP activities related to improving balance, coordination, kinesthetic sense, posture, motor skill, proprioception of core, proximal hip and LE for self care, mobility, lifting, and ambulation/stair navigation      Manual Treatments:  PROM / STM / Oscillations-Mobs:  G-I, II, III, IV (PA's, Inf., Post.)  [] (34919) Provided manual therapy to mobilize LE, proximal hip and/or LS spine soft tissue/joints for the purpose of modulating pain, towards functional goals listed. [] Progression is slowed due to complexities listed. [] Progression has been slowed due to co-morbidities. [x] Plan just implemented, too soon to assess goals progression  [] Other:     ASSESSMENT:   Functional Impairments:                          [x]Noted lumbar/proximal hip/LE hypomobility                        [x]Decreased LE functional ROM                        [x]Decreased core/proximal hip strength and neuromuscular control                        [x]Decreased LE functional strength   [x]Reduced balance/proprioceptive control                        []other:       Functional Activity Limitations (from functional questionnaire and intake)                        [x]Reduced ability to tolerate prolonged functional positions                        [x]Reduced ability or difficulty with changes of positions or transfers between positions                        [x]Reduced ability to maintain good posture and demonstrate good body mechanics with sitting, bending, and lifting                        []Reduced ability to sleep                        [] Reduced ability or tolerance with driving and/or computer work                        [x]Reduced ability to perform lifting, carrying tasks                        [x]Reduced ability to squat                        [x]Reduced ability to forward bend                        [x]Reduced ability to ambulate prolonged functional periods/distances/surfaces                        [x]Reduced ability to ascend/descend stairs                        [x]Reduced ability to run, hop or jump                        []other:     Participation Restrictions                        []Reduced participation in self care activities                        [x]Reduced participation in home management activities                        [x]Reduced participation in work activities                        [x]Reduced participation in social activities. [x]Reduced participation in sport activities.     Classification           :                         [x]Signs/symptoms consistent with post-surgical status including decreased ROM, strength and function. []Signs/symptoms consistent with joint sprain/strain                        [x]Signs/symptoms consistent with patella-femoral syndrome                        [x]Signs/symptoms consistent with knee OA/hip OA                        []Signs/symptoms consistent with internal derangement of knee/Hip                        [x]Signs/symptoms consistent with functional hip weakness/NMR control                                         []Signs/symptoms consistent with tendinitis/tendinosis                                             []signs/symptoms consistent with pathology which may benefit from Dry needling                                      []other:       Prognosis/Rehab Potential:                                                                     []Excellent                        [x]Good                                     []Fair                        []Poor    Treatment/Activity Tolerance:  [] Patient tolerated treatment well [x] Patient limited by fatique  [x] Patient limited by pain  [] Patient limited by other medical complications  [x] Other: Good tolerance to initial discussion/ table measurements, however, noted moderate functional limitations with WB flexion testing. Functional ADL limitations include, but not limited to knee pain/ swelling/ difficulty walking/ limited ROM/ LE muscle weakness. Full discussion and written instructions provided for preop appointment.     Prognosis: [x] Good [] Fair  [] Poor    Patient Requires Follow-up: [x] Yes  [] No    PLAN:   [] Continue per plan of care [] Alter current plan (see comments)  [x] Plan of care initiated [] Hold pending MD visit [] Discharge  Frequency/Duration:  2-3 days per week for 12 Weeks:    Bandage and dressing change  Initial PO protocol for L TKR procedure      Electronically signed by: Moises Walsh PT

## 2017-10-11 ENCOUNTER — CARE COORDINATION (OUTPATIENT)
Dept: CASE MANAGEMENT | Age: 67
End: 2017-10-11

## 2017-10-11 NOTE — CARE COORDINATION
[facility] Transitions Initial Follow Up Call    Call within 2 business days of discharge: Yes    Patient:  Aki Correia   Patient :  1950  MRN:  [id number]     Reason for Admission:  Left TKR  Discharge Date:  10/10/17    RARS:  Maikel  7.75    1ST CTC attempt to reach Pt regarding recent hospital discharge. CTC left voice recording with call back number requesting a call back / mobile number & home number / voicemail not setup     Follow up appointments:    Future Appointments  Date Time Provider Vickey Rea   10/12/2017 10:30 AM Artemio Flores, PT [facility] PT None   2017 9:20 AM Mercedes Pickett, CNP [facility]       JANY JuniorN, RN  Care Transition Coordinator  Contact Number:  [phone]

## 2017-10-12 ENCOUNTER — HOSPITAL ENCOUNTER (OUTPATIENT)
Dept: PHYSICAL THERAPY | Age: 67
Discharge: HOME | End: 2017-10-12
Admitting: ORTHOPAEDIC SURGERY

## 2017-10-12 DIAGNOSIS — M17.12 PRIMARY OSTEOARTHRITIS OF LEFT KNEE: Primary | ICD-10-CM

## 2017-10-12 RX ORDER — HYDROCODONE BITARTRATE AND ACETAMINOPHEN 5; 325 MG/1; MG/1
2 TABLET ORAL EVERY 6 HOURS PRN
Qty: 40 TABLET | Refills: 0 | Status: SHIPPED | OUTPATIENT
Start: 2017-10-12 | End: 2017-10-19

## 2017-10-12 NOTE — FLOWSHEET NOTE
[facility]  Orthopaedics and Sports Rehabilitation, [facility]    Physical Therapy Daily Treatment Note  Date:  10/12/2017    Patient Name:  Chandra Thomas    :  1950  MRN: [id number]  Restrictions/Precautions:    Medical/Treatment Diagnosis Information:  Diagnosis: M17.12 (ICD-10-CM) - Primary osteoarthritis of left knee  Treatment Diagnosis: Knee pain/ swelling/ difficulty walking/ limited ROM/ LE muscle weakness  S/P L knee TKR  DOS: 10/9/17  Current PO meds: Norco 1-2 tabs QID; Ibuprofen PRN; ASA 81 mg 1 tab BID;    Patient seen in consultation with Dr. Jeramie Burgos who established the initial/subsequent treatment protocol. 10-12-17: doing well, confirmed 81 mg ASA BID    Insurance/Certification information:  [employer] Plus Medicare  Physician Information:   Eduard Jo MD  Plan of care signed (Y/N): Y    Date of Patient follow up with Physician: FU appointments for PO MD/ PT at 3/ 6/ 12 weeks    G-Code (if applicable):      Date G-Code Applied:  10/12/17  PT G-Codes  Functional Assessment Tool Used: LEFS  Score: 28/ 80= 35%  Functional Limitation: Mobility: Walking and moving around  Mobility: Walking and Moving Around Current Status (): At least 60 percent but less than 80 percent impaired, limited or restricted  Mobility: Walking and Moving Around Goal Status ():  At least 1 percent but less than 20 percent impaired, limited or restricted    Progress Note: [x]  Yes  []  No  Next due by: Visit #10       Latex Allergy:  [x]NO      []YES  Preferred Language for Healthcare:   [x]English       []other:    Visit # Insurance Allowable   2 Medical Necessity     Pain Scale: 4-6 /10  Easing factors: Rest/ ice/ meds  Provocative factors:WB activity: walking> standing; stairs; kneeling; squatting (return> down)   Type: [x]Constant                     []Intermittent                []Radiating                   []Localized                   []other:                            SUBJECTIVE: Patient for Y Balance Test  10-5-17    Left  Right  Difference  Comments    Anterior  48 60 12     Posteromedial  96 92 +4     Posterolateral  80 80 0                 Leg Length            **Difference should be less than 4 cm for return to sport and preparticipation screening (<10% deficit compared to uninvolved)**  (Star Excursion Balance Test as a Predictor of Lower Extremity Injury in High School Basketball Players)         RESTRICTIONS/PRECAUTIONS: : Fibromyalgia; R TKR 11/ 2016; sleep apnea (using CPAP)    Exercises/Interventions:   Exercise/Equipment Resistance/Repetitions Other comments 10/12/2017   Stretching      Hamstring 30\"x 5 Towel prop x   Hip Flexion      ITB      Grion      Quad      Inclined Calf      Towel Pull 30\"x 5 Towel prop x         SLR      Supine 3x 10  x   Prone      Abduction      Adducton 2x 10\"x 10 PS; extended NPV   SLR+            Isometrics      Quad sets 2x 10\"x 10 A/S x         Patellar Glides      Medial 3'  NPV   Superior 3'  NPV   Inferior 3'  NPV         ROM      Passive 10' Manual x   Active      Weight Shift      Hang Weights 10' Propped x   Sheet Pulls      Ankle Pumps 5' Hourly x         CKC      Calf raises      Wall sits      Step ups      1 leg stand      Squatting      CC TKE      Balance            PRE      Extension  RANGE:    Flexion  RANGE:          Cable Column            Leg Press  RANGE:          Bike      Treadmill              Other Therapeutic Activities:   Walker: 25-50% PWB  RAGHAV hose  Cold Therapy    Home Exercise Program:    See above and attached. Initial HEP discussed and completed. Full written, verbal, and demonstration provided. Patient Education:      Full postop instructions provided. Written and verbal guidelines provided for but not limited to: DME/ HEP/ ICE/ gait/ general medical instructions.     Manual Treatments:   Bandage and dressing change  10'  PROM      10'      Therapeutic Exercise and NMR EXR  [x] (99614) Provided verbal/tactile cueing for ASSESSMENT:   Functional Impairments:                          [x]Noted lumbar/proximal hip/LE hypomobility                        [x]Decreased LE functional ROM                        [x]Decreased core/proximal hip strength and neuromuscular control                        [x]Decreased LE functional strength   [x]Reduced balance/proprioceptive control                        []other:       Functional Activity Limitations (from functional questionnaire and intake)                        [x]Reduced ability to tolerate prolonged functional positions                        [x]Reduced ability or difficulty with changes of positions or transfers between positions                        [x]Reduced ability to maintain good posture and demonstrate good body mechanics with sitting, bending, and lifting                        [x]Reduced ability to sleep                        [x] Reduced ability or tolerance with driving and/or computer work                        [x]Reduced ability to perform lifting, carrying tasks                        [x]Reduced ability to squat                        [x]Reduced ability to forward bend                        [x]Reduced ability to ambulate prolonged functional periods/distances/surfaces                        [x]Reduced ability to ascend/descend stairs                        [x]Reduced ability to run, hop or jump                        []other:     Participation Restrictions                        [x]Reduced participation in self care activities                        [x]Reduced participation in home management activities                        [x]Reduced participation in work activities                        [x]Reduced participation in social activities. [x]Reduced participation in sport activities.     Classification           :                         [x]Signs/symptoms consistent with post-surgical status including decreased ROM, strength and function. []Signs/symptoms consistent with joint sprain/strain                        [x]Signs/symptoms consistent with patella-femoral syndrome                        [x]Signs/symptoms consistent with knee OA/hip OA                        []Signs/symptoms consistent with internal derangement of knee/Hip                        [x]Signs/symptoms consistent with functional hip weakness/NMR control                                         []Signs/symptoms consistent with tendinitis/tendinosis                                             []signs/symptoms consistent with pathology which may benefit from Dry needling                                      []other:       Prognosis/Rehab Potential:                                                                     []Excellent                        [x]Good                                     []Fair                        []Poor    Treatment/Activity Tolerance:  [] Patient tolerated treatment well [x] Patient limited by fatique  [x] Patient limited by pain  [] Patient limited by other medical complications  [x] Other: Initial PO appointment with moderate pain and swelling complaints. Full discussion regarding surgical procedure/ HEP/ clinical expectations. Functional ADL limitations include, but not limited to knee pain/ swelling/ difficulty walking/ limited ROM/ LE muscle weakness. Full discussion and written instructions provided for postop appointment.     Prognosis: [x] Good [] Fair  [] Poor    Patient Requires Follow-up: [x] Yes  [] No    PLAN:   [] Continue per plan of care [] Alter current plan (see comments)  [x] Plan of care initiated [] Hold pending MD visit [] Discharge  Frequency/Duration:  2-3 days per week for 12 Weeks:    Bandage and dressing check  Initial PO protocol for L TKR procedure  Gait training  MATT/ SLR   ROM/ patellar glides  CKC/ balance      Electronically signed by: Tyler Stein PT

## 2017-10-17 ENCOUNTER — HOSPITAL ENCOUNTER (OUTPATIENT)
Dept: PHYSICAL THERAPY | Age: 67
Discharge: HOME | End: 2017-10-17
Admitting: ORTHOPAEDIC SURGERY

## 2017-10-17 NOTE — FLOWSHEET NOTE
The [facility] [employer] [facility]    Physical Therapy Daily Treatment Note  Date:  10/17/2017    Patient Name:  Jonah Garcia    :  1950  MRN: [id number]  Restrictions/Precautions:    Medical/Treatment Diagnosis Information:  Diagnosis: M17.12 (ICD-10-CM) - Primary osteoarthritis of left knee  Treatment Diagnosis: Knee pain/ swelling/ difficulty walking/ limited ROM/ LE muscle weakness  S/P L knee TKR  DOS: 10/9/17  Current PO meds: Norco 1-2 tabs QID; Ibuprofen PRN; ASA 81 mg 1 tab BID;    Patient seen in consultation with Dr. Alejandra Matthew who established the initial/subsequent treatment protocol. 10-12-17: doing well, confirmed 81 mg ASA BID    Insurance/Certification information:  [employer]  Physician Information:   Anselmo Schaumann, MD  Plan of care signed (Y/N): Y    Date of Patient follow up with Physician: FU appointments for PO MD/ PT at 3/ 6/ 12 weeks    G-Code (if applicable):      Date G-Code Applied:  10/12/17       Progress Note: [x]  Yes  []  No  Next due by: Visit #10       Latex Allergy:  [x]NO      []YES  Preferred Language for Healthcare:   [x]English       []other:    Visit # Insurance Allowable   3 Medical Necessity     Pain Scale: 4-6 /10  Easing factors: Rest/ ice/ meds  Provocative factors:WB activity: walking> standing; stairs; kneeling; squatting (return> down)   Type: [x]Constant                     []Intermittent                []Radiating                   []Localized                   []other:                            SUBJECTIVE: Pt states knee is doing well.  Does note increased pain this am with a quick movement before getting up this am.  Feels she is managing her resting pain well, but movement moderately increases the pain.         OBJECTIVE:       LEFS Score: 39/ 80= 49% (10/5/17; Preop)     10-17-17            ROM PROM AROM Overpressure Comment     L R L R L R     Flexion 98 140          Manual   Extension -2/0 0                                                  10-17-17  Strength L R Comment   Quad 3-/5 4+/5 MATT 0°   Hamstring         Gastroc         Hip  flexion       Hip abd                              10-12-17  Special Test Results/Comment   Homans (-)   Temperature (-)  98.9°F      10-12-17  Girth L R   Mid Patella 41.2 41.0   Suprapatellar 44.4 44.2   5cm above 53.2 52.0   15cm above          Reflexes/Sensation:                         [x]Dermatomes/Myotomes intact                         [x]Reflexes equal and normal bilaterally                        []Other:     Joint mobility:                         [x]Normal                                   []Hypo                        []Hyper     Palpation: Generalized due to PO pain and swelling     Functional Mobility/Transfers: Mild assist of 1 with transfer of LE from floor to table     Posture: Mild varus; prior L short (had used heel lift short term before changing shoe style)     Bandages/Dressings/Incisions: PO bandage and dressing change--removed aquacell and placed steri strips ; dry; clean; (-) redness      Gait: Rolling walker; antalgic pattern; 50% PWB     TEST INITIAL  10-5-16 FOLLOW  UP GOAL   SINGLE LEG SQUAT TEST L: Poor (-)     R: Poor (-)   NO KNEE VALGUS, MEDIAL/LATERAL MOVEMENT, OR PELVIC TILT  GOOD, FAIR, POOR   6 MINUTE WALK TEST 0.15 miles                       M             F          60-70 y/o: >.31mile,  >.30mile  66-78 y/o: >.28 mile, >.26 mile  80-81 y/o: >.21mile,  >.20 mile   STAIR CLIMBING TEST 12.24 sec   < 13 SEC (M&F)      Score Sheet for Y Balance Test  10-5-17    Left  Right  Difference  Comments    Anterior  48 60 12     Posteromedial  96 92 +4     Posterolateral  80 80 0                 Leg Length            **Difference should be less than 4 cm for return to sport and preparticipation screening (<10% deficit compared to uninvolved)**  (Star Excursion Balance Test as a Predictor of Lower Extremity Injury in [employer] Players)    RESTRICTIONS/PRECAUTIONS: : Fibromyalgia; R TKR 11/ 2016; sleep apnea (using CPAP)    Exercises/Interventions:   Exercise/Equipment Resistance/Repetitions Other comments 10/17/2017   Stretching      Hamstring 30\"x 5 Towel prop x   Hip Flexion      ITB      Grion      Quad      Inclined Calf      Towel Pull 30\"x 5 Towel prop x         SLR      Supine 3x 10  x   Prone      Abduction      Adducton 2x 10\"x 10 PS; extended x   SLR+            Isometrics      Quad sets 2x 10\"x 10 A/S x         Patellar Glides      Medial 3'  x   Superior 3'  x   Inferior 3'  x         ROM      Passive 10' Manual x   Active x10 KE x   Weight Shift      Hang Weights 10' Propped x   Sheet Pulls      Ankle Pumps 5' Hourly x         CKC      Calf raises   NPV   Wall sits      Step ups      1 leg stand      Squatting      CC TKE      Balance            PRE      Extension  RANGE:    Flexion  RANGE:          Cable Column            Leg Press  RANGE:          Bike      Treadmill              Other Therapeutic Activities:   Walker: 25-50% PWB  RAGHAV hose  Cold Therapy    Home Exercise Program:    See above and attached. Initial HEP discussed and completed. Full written, verbal, and demonstration provided. Patient Education:      Full postop instructions provided. Written and verbal guidelines provided for but not limited to: DME/ HEP/ ICE/ gait/ general medical instructions. Manual Treatments:   Bandage and dressing change  5'  PROM      10'  Patella mobs     9'      Therapeutic Exercise and NMR EXR  [x] (86677) Provided verbal/tactile cueing for activities related to strengthening, flexibility, endurance, ROM for improvements in LE, proximal hip, and core control with self care, mobility, lifting, ambulation.   [x] (02041) Provided verbal/tactile cueing for activities related to improving balance, coordination, kinesthetic sense, posture, motor skill, proprioception  to assist with LE, proximal hip, and core control in self care, mobility, Limitations (from functional questionnaire and intake)                        [x]Reduced ability to tolerate prolonged functional positions                        [x]Reduced ability or difficulty with changes of positions or transfers between positions                        [x]Reduced ability to maintain good posture and demonstrate good body mechanics with sitting, bending, and lifting                        [x]Reduced ability to sleep                        [x] Reduced ability or tolerance with driving and/or computer work                        [x]Reduced ability to perform lifting, carrying tasks                        [x]Reduced ability to squat                        [x]Reduced ability to forward bend                        [x]Reduced ability to ambulate prolonged functional periods/distances/surfaces                        [x]Reduced ability to ascend/descend stairs                        [x]Reduced ability to run, hop or jump                        []other:     Participation Restrictions                        [x]Reduced participation in self care activities                        [x]Reduced participation in home management activities                        [x]Reduced participation in work activities                        [x]Reduced participation in social activities. [x]Reduced participation in sport activities.     Classification           :                         [x]Signs/symptoms consistent with post-surgical status including decreased ROM, strength and function.                         []Signs/symptoms consistent with joint sprain/strain                        [x]Signs/symptoms consistent with patella-femoral syndrome                        [x]Signs/symptoms consistent with knee OA/hip OA                        []Signs/symptoms consistent with internal derangement of knee/Hip                        [x]Signs/symptoms consistent with functional hip weakness/NMR control

## 2017-10-19 ENCOUNTER — HOSPITAL ENCOUNTER (OUTPATIENT)
Dept: PHYSICAL THERAPY | Age: 67
Discharge: HOME | End: 2017-10-19
Admitting: ORTHOPAEDIC SURGERY

## 2017-10-19 ENCOUNTER — CARE COORDINATION (OUTPATIENT)
Dept: CASE MANAGEMENT | Age: 67
End: 2017-10-19

## 2017-10-19 NOTE — FLOWSHEET NOTE
The [facility] [employer]  [facility]ed[facility]    Physical Therapy Daily Treatment Note  Date:  10/19/2017    Patient Name:  James Preciado    :  1950  MRN: [id number]  Restrictions/Precautions:    Medical/Treatment Diagnosis Information:  Diagnosis: M17.12 (ICD-10-CM) - Primary osteoarthritis of left knee  Treatment Diagnosis: Knee pain/ swelling/ difficulty walking/ limited ROM/ LE muscle weakness  S/P L knee TKR  DOS: 10/9/17  Current PO meds: Norco 1-2 tabs QID; Ibuprofen PRN; ASA 81 mg 1 tab BID;    Patient seen in consultation with Dr. Gino Felty who established the initial/subsequent treatment protocol. 10-12-17: doing well, confirmed 81 mg ASA BID    Insurance/Certification information:  [employer]  Physician Information:   Mehdi Barrera, MD  Plan of care signed (Y/N): Y    Date of Patient follow up with Physician: FU appointments for PO MD/ PT at 3/  weeks    G-Code (if applicable):      Date G-Code Applied:  10/12/17       Progress Note: [x]  Yes  []  No  Next due by: Visit #10       Latex Allergy:  [x]NO      []YES  Preferred Language for Healthcare:   [x]English       []other:    Visit # Insurance Allowable   4 Medical Necessity     Pain Scale: 0-1/10 @ rest            4 /10 @ worst  Easing factors: Rest/ ice/ meds  Provocative factors:WB activity: walking> standing; stairs; kneeling; squatting (return> down)   Type: [x]Constant                     []Intermittent                []Radiating                   []Localized                   []other:                            SUBJECTIVE: Pt states knee is doing well, just sore and swollen. Did notice a lump in posterior knee/proxmial calf that is tender to touch and isn't sure if she should be concerned. Denies any fevers or increased calf pain except with palpation.       OBJECTIVE:       LEFS Score: 39/ 80= 49% (10/5/17; Preop)     10-19-17            ROM PROM AROM Overpressure Comment     L R L R L R   coordination, kinesthetic sense, posture, motor skill, proprioception  to assist with LE, proximal hip, and core control in self care, mobility, lifting, ambulation and eccentric single leg control. NMR and Therapeutic Activities:    [x] (69151 or 35563) Provided verbal/tactile cueing for activities related to improving balance, coordination, kinesthetic sense, posture, motor skill, proprioception and motor activation to allow for proper function of core, proximal hip and LE with self care and ADLs  [] (25413) Gait Re-education- Provided training and instruction to the patient for proper LE, core and proximal hip recruitment and positioning and eccentric body weight control with ambulation re-education including up and down stairs     Home Exercise Program:    [x] (44791) Reviewed/Progressed HEP activities related to strengthening, flexibility, endurance, ROM of core, proximal hip and LE for functional self-care, mobility, lifting and ambulation/stair navigation   [x] (01958)Reviewed/Progressed HEP activities related to improving balance, coordination, kinesthetic sense, posture, motor skill, proprioception of core, proximal hip and LE for self care, mobility, lifting, and ambulation/stair navigation      Manual Treatments:  PROM / STM / Oscillations-Mobs:  G-I, II, III, IV (PA's, Inf., Post.)  [x] (51487) Provided manual therapy to mobilize LE, proximal hip and/or LS spine soft tissue/joints for the purpose of modulating pain, promoting relaxation,  increasing ROM, reducing/eliminating soft tissue swelling/inflammation/restriction, improving soft tissue extensibility and allowing for proper ROM for normal function with self care, mobility, lifting and ambulation.      Modalities:    [] hot packs  [x] EMS   [] Ultrasound  [] ice   [x] vasopneumatic  [] high volt/EGS  [] phono  [] tens    [] ionto  [] autorange/biodex [] Interferential  [] other     Charges:  Timed Code Treatment Minutes: 46'   Total Treatment Minutes: 80'     [] EVAL: L2  [x] DD(20892) x  1   [] IONTO  [] NMR (54694) x      [x] VASO  [x] Manual (04655) x  1    [] Other:  [x] TA x  1    [] Mech Traction (17008)  [] ES(attended) (04236)      [x] ES (un) (10249):     GOALS:  Patient stated goal: Would like to resume full pain free walking.     Therapist goals for Patient:   Short Term Goals: To be achieved in: 2 weeks  1. Independent in HEP and progression per patient tolerance, in order to prevent re-injury. 2. Patient will have a decrease in pain to facilitate improvement in movement, function, and ADLs as indicated by Functional Deficits.     Long Term Goals: To be achieved in: 12 weeks  1. Disability index score of 50% or less for the LEFS to assist with reaching prior level of function. 2. Patient will demonstrate increased AROM to 0-130 deg to allow for proper joint functioning as indicated by patients Functional Deficits. 3. Patient will demonstrate an increase in Strength to good proximal hip strength and control in LE to allow for proper functional mobility as indicated by patients Functional Deficits/ Biodex parameters for bilateral comparison/ TQBW and HQ ratios based on age/ sex/ BW corrections. 4. Patient will return to 50%> functional activities without increased symptoms or restriction. 5. Will resume pain free ADL walking, and begin a recreational walking program.                Progression Towards Functional goals:  [] Patient is progressing as expected towards functional goals listed. [] Progression is slowed due to complexities listed. [] Progression has been slowed due to co-morbidities.   [x] Plan just implemented, too soon to assess goals progression  [] Other:     ASSESSMENT:   Functional Impairments:                          [x]Noted lumbar/proximal hip/LE hypomobility                        [x]Decreased LE functional ROM                        [x]Decreased core/proximal hip strength and neuromuscular control consistent with internal derangement of knee/Hip                        [x]Signs/symptoms consistent with functional hip weakness/NMR control                                         []Signs/symptoms consistent with tendinitis/tendinosis                                             []signs/symptoms consistent with pathology which may benefit from Dry needling                                      []other:       Prognosis/Rehab Potential:                                                                     []Excellent                        [x]Good                                     []Fair                        []Poor    Treatment/Activity Tolerance:  [x] Patient tolerated treatment well [x] Patient limited by fatique  [] Patient limited by pain  [] Patient limited by other medical complications  [x] Other: Tolerated tx well & had good gains with KF ROM today. Reviewed calf tenderness & watching for fever, redness, swelling and what to do if s&s occur. Functional ADL limitations include, but not limited to knee pain/ swelling/ difficulty walking/ limited ROM/ LE muscle weakness.       Prognosis: [x] Good [] Fair  [] Poor    Patient Requires Follow-up: [x] Yes  [] No    PLAN:   [] Continue per plan of care [] Alter current plan (see comments)  [x] Plan of care initiated [] Hold pending MD visit [] Discharge  Frequency/Duration:  2-3 days per week for 12 Weeks:    Bandage and dressing check  Initial PO protocol for L TKR procedure  Gait training  MATT/ SLR   ROM/ patellar glides  CKC/ balance      Electronically signed by:    Aaron Encarnacion ATC

## 2017-10-23 ENCOUNTER — OFFICE VISIT (OUTPATIENT)
Dept: FAMILY MEDICINE CLINIC | Age: 67
End: 2017-10-23

## 2017-10-23 VITALS
HEART RATE: 71 BPM | BODY MASS INDEX: 37.8 KG/M2 | WEIGHT: 210 LBS | SYSTOLIC BLOOD PRESSURE: 116 MMHG | DIASTOLIC BLOOD PRESSURE: 82 MMHG | TEMPERATURE: 98 F

## 2017-10-23 DIAGNOSIS — Z23 NEED FOR PROPHYLACTIC VACCINATION AGAINST STREPTOCOCCUS PNEUMONIAE (PNEUMOCOCCUS): ICD-10-CM

## 2017-10-23 DIAGNOSIS — M17.12 PRIMARY OSTEOARTHRITIS OF LEFT KNEE: Primary | ICD-10-CM

## 2017-10-23 DIAGNOSIS — M79.7 FIBROMYALGIA: ICD-10-CM

## 2017-10-23 DIAGNOSIS — Z12.11 SCREENING FOR COLON CANCER: ICD-10-CM

## 2017-10-23 DIAGNOSIS — Z96.652 STATUS POST TOTAL LEFT KNEE REPLACEMENT: ICD-10-CM

## 2017-10-23 PROCEDURE — G0009 ADMIN PNEUMOCOCCAL VACCINE: HCPCS | Performed by: FAMILY MEDICINE

## 2017-10-23 PROCEDURE — 90670 PCV13 VACCINE IM: CPT | Performed by: FAMILY MEDICINE

## 2017-10-23 PROCEDURE — 99213 OFFICE O/P EST LOW 20 MIN: CPT | Performed by: FAMILY MEDICINE

## 2017-10-23 RX ORDER — HYDROCODONE BITARTRATE AND ACETAMINOPHEN 5; 325 MG/1; MG/1
1 TABLET ORAL EVERY 8 HOURS PRN
COMMUNITY
End: 2017-11-08 | Stop reason: ALTCHOICE

## 2017-10-23 ASSESSMENT — ENCOUNTER SYMPTOMS
CONSTIPATION: 0
BLOOD IN STOOL: 0
WHEEZING: 0
DIARRHEA: 0
COUGH: 0
SHORTNESS OF BREATH: 0

## 2017-10-23 NOTE — PROGRESS NOTES
history/Current status: Doing OK. No issues. Doing P-T and motion is improved and pain is decreasing and managed with low dose analgesic and motrin . Swelling is decreasing   Bowels OK. Managing with stool softener and Prune juice. Doing fruit and fiber. OK    Physical Exam:    See note        Assessment/Plan:  Leward Shone was seen today for post-op check. Diagnoses and all orders for this visit:    Screening for colon cancer  -     POCT FECAL IMMUNOCHEMICAL TEST (FIT); Future    Need for prophylactic vaccination against Streptococcus pneumoniae (pneumococcus)  -     Pneumococcal conjugate vaccine 13-valent PCV13          Medical Decision Making: low complexity      Subjective:      Patient ID: Suresh Messer is a 74 y.o. female. HPI    Review of Systems   Respiratory: Negative for cough, shortness of breath and wheezing. Cardiovascular: Negative for chest pain, palpitations and leg swelling. Gastrointestinal: Negative for blood in stool, constipation and diarrhea. Genitourinary: Negative for dysuria, frequency and hematuria. Musculoskeletal:        Left knee sx. On one crutch. Weight bearing as tolerated       Objective:   Physical Exam   Constitutional: She is oriented to person, place, and time. She appears well-developed and well-nourished. No distress. Neck: No thyromegaly present. Cardiovascular: Normal rate, regular rhythm and normal heart sounds. Pulmonary/Chest: Effort normal and breath sounds normal. No respiratory distress. She has no wheezes. She has no rales. Musculoskeletal: She exhibits no edema. Steri-strips left knee incision. Clean. No infection. Minimal swelling    Flexes well beyond 90 degrees    Neurological: She is alert and oriented to person, place, and time. No cranial nerve deficit. Skin: Skin is warm and dry. Psychiatric: She has a normal mood and affect.  Her behavior is normal. Thought content normal.       Assessment:        DJD left knee,  s /p

## 2017-10-24 ENCOUNTER — HOSPITAL ENCOUNTER (OUTPATIENT)
Dept: PHYSICAL THERAPY | Age: 67
Discharge: HOME | End: 2017-10-24
Admitting: ORTHOPAEDIC SURGERY

## 2017-10-24 NOTE — FLOWSHEET NOTE
(30376) x      [x] VASO  [] Manual (74583) x       [] Other:  [x] TA x  1    [] Mech Traction (22166)  [] ES(attended) (60811)      [x] ES (un) (83130):     GOALS:  Patient stated goal: Would like to resume full pain free walking.     Therapist goals for Patient:   Short Term Goals: To be achieved in: 2 weeks  1. Independent in HEP and progression per patient tolerance, in order to prevent re-injury. 2. Patient will have a decrease in pain to facilitate improvement in movement, function, and ADLs as indicated by Functional Deficits.     Long Term Goals: To be achieved in: 12 weeks  1. Disability index score of 50% or less for the LEFS to assist with reaching prior level of function. 2. Patient will demonstrate increased AROM to 0-130 deg to allow for proper joint functioning as indicated by patients Functional Deficits. 3. Patient will demonstrate an increase in Strength to good proximal hip strength and control in LE to allow for proper functional mobility as indicated by patients Functional Deficits/ Biodex parameters for bilateral comparison/ TQBW and HQ ratios based on age/ sex/ BW corrections. 4. Patient will return to 50%> functional activities without increased symptoms or restriction. 5. Will resume pain free ADL walking, and begin a recreational walking program.                Progression Towards Functional goals:  [] Patient is progressing as expected towards functional goals listed. [] Progression is slowed due to complexities listed. [] Progression has been slowed due to co-morbidities.   [x] Plan just implemented, too soon to assess goals progression  [] Other:     ASSESSMENT:   Functional Impairments:                          [x]Noted lumbar/proximal hip/LE hypomobility                        [x]Decreased LE functional ROM                        [x]Decreased core/proximal hip strength and neuromuscular control                        [x]Decreased LE functional strength   [x]Reduced [x]Signs/symptoms consistent with functional hip weakness/NMR control                                         []Signs/symptoms consistent with tendinitis/tendinosis                                             []signs/symptoms consistent with pathology which may benefit from Dry needling                                      []other:       Prognosis/Rehab Potential:                                                                     []Excellent                        [x]Good                                     []Fair                        []Poor    Treatment/Activity Tolerance:  [x] Patient tolerated treatment well [x] Patient limited by fatique  [] Patient limited by pain  [] Patient limited by other medical complications  [x] Other: Tolerated tx well & had good gains with KF ROM today. Improved gait and mobility transitioning from RW to 1 crutch. Functional ADL limitations include, but not limited to knee pain/ swelling/ difficulty walking/ limited ROM/ LE muscle weakness.       Prognosis: [x] Good [] Fair  [] Poor    Patient Requires Follow-up: [x] Yes  [] No    PLAN:   [] Continue per plan of care [] Alter current plan (see comments)  [x] Plan of care initiated [] Hold pending MD visit [] Discharge  Frequency/Duration:  2-3 days per week for 12 Weeks:    Bandage and dressing check  Initial PO protocol for L TKR procedure  Gait training  MATT/ SLR   ROM/ patellar glides  CKC/ balance      Electronically signed by:    Eladio Grady PTA

## 2017-10-25 DIAGNOSIS — Z12.11 SCREENING FOR COLON CANCER: ICD-10-CM

## 2017-10-25 LAB
CONTROL: NORMAL
HEMOCCULT STL QL: NORMAL

## 2017-10-25 PROCEDURE — 82274 ASSAY TEST FOR BLOOD FECAL: CPT | Performed by: FAMILY MEDICINE

## 2017-10-26 ENCOUNTER — HOSPITAL ENCOUNTER (OUTPATIENT)
Dept: PHYSICAL THERAPY | Age: 67
Discharge: HOME | End: 2017-10-26
Admitting: ORTHOPAEDIC SURGERY

## 2017-10-26 NOTE — FLOWSHEET NOTE
10-19-17  Strength L R Comment   Quad 3-/5 4+/5 MATT 0°   Hamstring         Gastroc         Hip  flexion       Hip abd                              10-19-17  Special Test Results/Comment   Homans (-)   Temperature (-)      10-12-17  Girth L R   Mid Patella 41.2 41.0   Suprapatellar 44.4 44.2   5cm above 53.2 52.0   15cm above          Reflexes/Sensation:                         [x]Dermatomes/Myotomes intact                         [x]Reflexes equal and normal bilaterally                        []Other:     Joint mobility:                         [x]Normal                                   []Hypo                        []Hyper     Palpation: minimal tenderness     Functional Mobility/Transfers: Independent with transfer of LE from floor to table     Posture: Mild varus; prior L short (had used heel lift short term before changing shoe style)     Bandages/Dressings/Incisions: -- ; dry; clean; (-) redness      Gait:   PWBAT, 1 crutch     TEST INITIAL  10-5-16 FOLLOW  UP GOAL   SINGLE LEG SQUAT TEST L: Poor (-)     R: Poor (-)   NO KNEE VALGUS, MEDIAL/LATERAL MOVEMENT, OR PELVIC TILT  GOOD, FAIR, POOR   6 MINUTE WALK TEST 0.15 miles                       M             F          60-70 y/o: >.31mile,  >.30mile  66-78 y/o: >.28 mile, >.26 mile  80-81 y/o: >.21mile,  >.20 mile   STAIR CLIMBING TEST 12.24 sec   < 13 SEC (M&F)      Score Sheet for Y Balance Test  10-5-17    Left  Right  Difference  Comments    Anterior  48 60 12     Posteromedial  96 92 +4     Posterolateral  80 80 0                 Leg Length            **Difference should be less than 4 cm for return to sport and preparticipation screening (<10% deficit compared to uninvolved)**  (Star Excursion Balance Test as a Predictor of Lower Extremity Injury in [employer] Players)         RESTRICTIONS/PRECAUTIONS: : Fibromyalgia; R TKR 11/ 2016; sleep apnea (using CPAP)    Exercises/Interventions:   Exercise/Equipment []Signs/symptoms consistent with tendinitis/tendinosis                                             []signs/symptoms consistent with pathology which may benefit from Dry needling                                      []other:       Prognosis/Rehab Potential:                                                                     []Excellent                        [x]Good                                     []Fair                        []Poor    Treatment/Activity Tolerance:  [x] Patient tolerated treatment well [x] Patient limited by fatique  [] Patient limited by pain  [] Patient limited by other medical complications  [x] Other: Tolerated tx well & had good gains with KF ROM today. Progressing well. Primary limitation is weakness. Functional ADL limitations include, but not limited to knee pain/ swelling/ difficulty walking/ limited ROM/ LE muscle weakness.       Prognosis: [x] Good [] Fair  [] Poor    Patient Requires Follow-up: [x] Yes  [] No    PLAN:   [] Continue per plan of care [] Alter current plan (see comments)  [x] Plan of care initiated [] Hold pending MD visit [] Discharge  Frequency/Duration:  2-3 days per week for 12 Weeks:    Bandage and dressing check  Initial PO protocol for L TKR procedure  Gait training  MATT/ SLR   ROM/ patellar glides  CKC/ balance      Electronically signed by:    Gely Fletcher PTA

## 2017-10-30 ENCOUNTER — HOSPITAL ENCOUNTER (OUTPATIENT)
Dept: PHYSICAL THERAPY | Age: 67
Discharge: HOME | End: 2017-10-30
Admitting: ORTHOPAEDIC SURGERY

## 2017-10-30 NOTE — FLOWSHEET NOTE
[facility]    Physical Therapy Daily Treatment Note  Date:  10/30/2017    Patient Name:  Austin Buenrostro    :  1950  MRN: [id number]  Restrictions/Precautions:    Medical/Treatment Diagnosis Information:  Diagnosis: M17.12 (ICD-10-CM) - Primary osteoarthritis of left knee  Treatment Diagnosis: Knee pain/ swelling/ difficulty walking/ limited ROM/ LE muscle weakness  S/P L knee TKR  DOS: 10/9/17  Current PO meds: Norco 1-2 tabs QID; Ibuprofen PRN; ASA 81 mg 1 tab BID;    Patient seen in consultation with Dr. Reji Flores who established the initial/subsequent treatment protocol. 10-12-17: doing well, confirmed 81 mg ASA BID    Insurance/Certification information:  [employer]  Physician Information:   Myke Bragg MD  Plan of care signed (Y/N): Y    Date of Patient follow up with Physician: FU appointments for PO MD/ PT at 3 weeks    G-Code (if applicable):      Date G-Code Applied:  10/12/17       Progress Note: [x]  Yes  []  No  Next due by: Visit #10       Latex Allergy:  [x]NO      []YES  Preferred Language for Healthcare:   [x]English       []other:    Visit # Insurance Allowable   7 Medical Necessity     Pain Scale: 1/10 @ rest            2-3 /10 @ worst  Easing factors: Rest/ ice/ meds  Provocative factors:WB activity: walking> standing; stairs; kneeling; squatting (return> down)   Type: [x]Constant                     []Intermittent                []Radiating                   []Localized                   []other:                            SUBJECTIVE: States fibromyalgia flared up over weekend. Overall knee feels good. No longer needs crutch.     OBJECTIVE:       LEFS Score: 39/ 80= 49% (10/5/17; Preop)     10-30-17            ROM PROM AROM Overpressure Comment     L R L R L R     Flexion 133° 140°          ERMI   Extension -2°  0° 0°          After prop  With QS                                          10-19-17  Strength L R Comment   Quad 3-/5 4+/5 MATT 0°   Hamstring         Gastroc         Hip  flexion       Hip abd                              10-19-17  Special Test Results/Comment   Homans (-)   Temperature (-)      10-12-17  Girth L R   Mid Patella 41.2 41.0   Suprapatellar 44.4 44.2   5cm above 53.2 52.0   15cm above          Reflexes/Sensation:                         [x]Dermatomes/Myotomes intact                         [x]Reflexes equal and normal bilaterally                        []Other:     Joint mobility:                         [x]Normal                                   []Hypo                        []Hyper     Palpation: minimal tenderness     Functional Mobility/Transfers: Independent with transfer of LE from floor to table     Posture: Mild varus; prior L short (had used heel lift short term before changing shoe style)     Bandages/Dressings/Incisions: -- ; dry; clean; (-) redness      Gait:   PWBAT, 1 crutch     TEST INITIAL  10-5-16 FOLLOW  UP GOAL   SINGLE LEG SQUAT TEST L: Poor (-)     R: Poor (-)   NO KNEE VALGUS, MEDIAL/LATERAL MOVEMENT, OR PELVIC TILT  GOOD, FAIR, POOR   6 MINUTE WALK TEST 0.15 miles                       M             F          60-68 y/o: >.31mile,  >.30mile  66-76 y/o: >.28 mile, >.26 mile  80-81 y/o: >.21mile,  >.20 mile   STAIR CLIMBING TEST 12.24 sec   < 13 SEC (M&F)      Score Sheet for Y Balance Test  10-5-17    Left  Right  Difference  Comments    Anterior  48 60 12     Posteromedial  96 92 +4     Posterolateral  80 80 0                 Leg Length            **Difference should be less than 4 cm for return to sport and preparticipation screening (<10% deficit compared to uninvolved)**  (Star Excursion Balance Test as a Predictor of Lower Extremity Injury in [employer] Players)         RESTRICTIONS/PRECAUTIONS: : Fibromyalgia; R TKR 11/ 2016; sleep apnea (using CPAP)    Exercises/Interventions:   Exercise/Equipment Resistance/Repetitions Other comments 10/30/2017 GOALS:  Patient stated goal: Would like to resume full pain free walking.     Therapist goals for Patient:   Short Term Goals: To be achieved in: 2 weeks  1. Independent in HEP and progression per patient tolerance, in order to prevent re-injury. 2. Patient will have a decrease in pain to facilitate improvement in movement, function, and ADLs as indicated by Functional Deficits.     Long Term Goals: To be achieved in: 12 weeks  1. Disability index score of 50% or less for the LEFS to assist with reaching prior level of function. 2. Patient will demonstrate increased AROM to 0-130 deg to allow for proper joint functioning as indicated by patients Functional Deficits. 3. Patient will demonstrate an increase in Strength to good proximal hip strength and control in LE to allow for proper functional mobility as indicated by patients Functional Deficits/ Biodex parameters for bilateral comparison/ TQBW and HQ ratios based on age/ sex/ BW corrections. 4. Patient will return to 50%> functional activities without increased symptoms or restriction. 5. Will resume pain free ADL walking, and begin a recreational walking program.                Progression Towards Functional goals:  [] Patient is progressing as expected towards functional goals listed. [] Progression is slowed due to complexities listed. [] Progression has been slowed due to co-morbidities.   [x] Plan just implemented, too soon to assess goals progression  [] Other:     ASSESSMENT:   Functional Impairments:                          [x]Noted lumbar/proximal hip/LE hypomobility                        [x]Decreased LE functional ROM                        [x]Decreased core/proximal hip strength and neuromuscular control                        [x]Decreased LE functional strength   [x]Reduced balance/proprioceptive control                        []other:       Functional Activity Limitations (from functional questionnaire and intake) [x]Reduced ability to tolerate prolonged functional positions                        [x]Reduced ability or difficulty with changes of positions or transfers between positions                        [x]Reduced ability to maintain good posture and demonstrate good body mechanics with sitting, bending, and lifting                        [x]Reduced ability to sleep                        [x] Reduced ability or tolerance with driving and/or computer work                        [x]Reduced ability to perform lifting, carrying tasks                        [x]Reduced ability to squat                        [x]Reduced ability to forward bend                        [x]Reduced ability to ambulate prolonged functional periods/distances/surfaces                        [x]Reduced ability to ascend/descend stairs                        [x]Reduced ability to run, hop or jump                        []other:     Participation Restrictions                        [x]Reduced participation in self care activities                        [x]Reduced participation in home management activities                        [x]Reduced participation in work activities                        [x]Reduced participation in social activities. [x]Reduced participation in sport activities.     Classification           :                         [x]Signs/symptoms consistent with post-surgical status including decreased ROM, strength and function.                         []Signs/symptoms consistent with joint sprain/strain                        [x]Signs/symptoms consistent with patella-femoral syndrome                        [x]Signs/symptoms consistent with knee OA/hip OA                        []Signs/symptoms consistent with internal derangement of knee/Hip                        [x]Signs/symptoms consistent with functional hip weakness/NMR control                                         []Signs/symptoms consistent with tendinitis/tendinosis                                             []signs/symptoms consistent with pathology which may benefit from Dry needling                                      []other:       Prognosis/Rehab Potential:                                                                     []Excellent                        [x]Good                                     []Fair                        []Poor    Treatment/Activity Tolerance:  [x] Patient tolerated treatment well [x] Patient limited by fatique  [] Patient limited by pain  [] Patient limited by other medical complications  [x] Other: Tolerated tx well. Quad control is improving and pt is ready to add wt to table exercises for NPV. Gait is WNL. Challenged by balance activities. Functional ADL limitations include, but not limited to knee pain/ swelling/ difficulty walking/ limited ROM/ LE muscle weakness.       Prognosis: [x] Good [] Fair  [] Poor    Patient Requires Follow-up: [x] Yes  [] No    PLAN:   [] Continue per plan of care [] Alter current plan (see comments)  [x] Plan of care initiated [] Hold pending MD visit [] Discharge  Frequency/Duration:  2-3 days per week for 12 Weeks:  Initial PO protocol for L TKR procedure  Gait training  MATT/ SLR   ROM/ patellar glides  CKC/ balance  Add weight to SAQ/SLR NPV    Electronically signed by:    Arthur Huffman PTA

## 2017-11-01 ENCOUNTER — HOSPITAL ENCOUNTER (OUTPATIENT)
Dept: PHYSICAL THERAPY | Age: 67
Discharge: HOME | End: 2017-11-30
Attending: ORTHOPAEDIC SURGERY | Admitting: ORTHOPAEDIC SURGERY

## 2017-11-02 ENCOUNTER — HOSPITAL ENCOUNTER (OUTPATIENT)
Dept: PHYSICAL THERAPY | Age: 67
Discharge: HOME | End: 2017-11-02
Admitting: ORTHOPAEDIC SURGERY

## 2017-11-02 NOTE — FLOWSHEET NOTE
[facility]  Orthopaedics and Sports Rehabilitation, [facility]    Physical Therapy Daily Treatment Note  Date:  2017    Patient Name:  Consuelo Leyva    :  1950  MRN: [id number]  Restrictions/Precautions:    Medical/Treatment Diagnosis Information:  Diagnosis: M17.12 (ICD-10-CM) - Primary osteoarthritis of left knee  Treatment Diagnosis: Knee pain/ swelling/ difficulty walking/ limited ROM/ LE muscle weakness  S/P L knee TKR  DOS: 10/9/17  Current PO meds: ; Ibuprofen PRN; ASA 81 mg 1 tab BID;    Patient seen in consultation with Dr. Cheryle Gala who established the initial/subsequent treatment protocol. 10-12-17: doing well, confirmed 81 mg ASA BID    Insurance/Certification information:  [employer]  Physician Information:   Ishan Chew MD  Plan of care signed (Y/N): Y    Date of Patient follow up with Physician: FU appointments for PO MD/ PT at 3/ 6/ 12 weeks    G-Code (if applicable):      Date G-Code Applied:  10/12/17       Progress Note: [x]  Yes  []  No  Next due by: Visit #10       Latex Allergy:  [x]NO      []YES  Preferred Language for Healthcare:   [x]English       []other:    Visit # Insurance Allowable   8 Medical Necessity     Pain Scale: 1/10 @ rest            2-3 /10 @ worst  Easing factors: Rest/ ice/ meds  Provocative factors:WB activity: walking> standing; stairs; kneeling; squatting (return> down)   Type: [x]Constant                     []Intermittent                []Radiating                   []Localized                   []other:                            SUBJECTIVE: Pt states knee feels good today. Noticed a big change/improvement on Tuesday. Reports soreness around kneecap with exercises. Movement feels better if she \"clenches\" muscles first. Has not taken norco for over 1 week, using advil as needed.     OBJECTIVE:       LEFS Score: 39/ 80= 49% (10/5/17; Preop)  LEFS Score: 42/80=53%  (17)     17            ROM PROM AROM Overpressure Comment   L R L R L R     Flexion 135° 140°          ERMI   Extension -2°  0° 0°          After prop  With QS                                          10-19-17  Strength L R Comment   Quad 3-/5 4+/5 MATT 0°   Hamstring         Gastroc         Hip  flexion       Hip abd                              10-19-17  Special Test Results/Comment   Homans (-)   Temperature (-)      10-12-17  Girth L R   Mid Patella 41.2 41.0   Suprapatellar 44.4 44.2   5cm above 53.2 52.0   15cm above          Reflexes/Sensation:                         [x]Dermatomes/Myotomes intact                         [x]Reflexes equal and normal bilaterally                        []Other:     Joint mobility:                         [x]Normal                                   []Hypo                        []Hyper     Palpation: minimal tenderness     Functional Mobility/Transfers: Independent with transfer of LE from floor to table     Posture: Mild varus; prior L short (had used heel lift short term before changing shoe style)     Bandages/Dressings/Incisions: healed, mild thickening along scar     Gait:   FWB     TEST INITIAL  10-5-16 FOLLOW  UP GOAL   SINGLE LEG SQUAT TEST L: Poor (-)     R: Poor (-)   NO KNEE VALGUS, MEDIAL/LATERAL MOVEMENT, OR PELVIC TILT  GOOD, FAIR, POOR   6 MINUTE WALK TEST 0.15 miles                       M             F          60-68 y/o: >.31mile,  >.30mile  66-76 y/o: >.28 mile, >.26 mile  80-79 y/o: >.21mile,  >.20 mile   STAIR CLIMBING TEST 12.24 sec   < 13 SEC (M&F)      Score Sheet for Y Balance Test  10-5-17    Left  Right  Difference  Comments    Anterior  48 60 12     Posteromedial  96 92 +4     Posterolateral  80 80 0                 Leg Length            **Difference should be less than 4 cm for return to sport and preparticipation screening (<10% deficit compared to uninvolved)**  (Star Excursion Balance Test as a Predictor of Lower Extremity Injury in [employer] Players)         RESTRICTIONS/PRECAUTIONS: : Fibromyalgia; R TKR 11/ 2016; sleep apnea (using CPAP)    Exercises/Interventions:   Exercise/Equipment Resistance/Repetitions Other comments 11/2/2017   Stretching      Hamstring 30\"x 5 Towel prop x   Hip Flexion      ITB      Grion      Quad      Inclined Calf      Towel Pull 30\"x 5 Towel prop x         SLR      Supine 3 x 10 1# x   Prone      Abduction 3 x 10 1# x   Adducton 2 x 10 x 10\" PS; extended x   SLR+            Isometrics      Quad sets 2 x 10 x 10\" A/S x         Patellar Glides      Medial 3'  x   Superior 3'  x   Inferior 3'  x         ROM      Passive 10' ERMI x   Active x10 KE x   Weight Shift      Hang Weights 10' Propped x   Sheet Pulls      Ankle Pumps 5' Hourly x         CKC      Calf raises 3 x 10  x   Wall sits      Step ups      Tandem stance 3x30'' ea  x   Squatting 3x10 mini x   CC TKE 3 x 10 Black x   Balance 4' PS L8 x         PRE      Extension 3x10 RANGE: 30-0, 2# x   Flexion 3x10 RANGE: 0-90, 2# x         Cable Column            Leg Press  RANGE:          Bike 10' REC-ROM x   Treadmill 6' Gait training x   Gait training 5' Cup walk with 1 AD *     Other Therapeutic Activities:   Walker: 50% PWB-> may progress per tolerance  RAGHAV hose  Cold Therapy    Home Exercise Program:    See above and attached. Initial HEP discussed and completed. Full written, verbal, and demonstration provided. Patient Education:      Full postop instructions provided. Written and verbal guidelines provided for but not limited to: DME/ HEP/ ICE/ gait/ general medical instructions. Manual Treatments:       Patella mobs/STM    12'      Therapeutic Exercise and NMR EXR  [x] (55609) Provided verbal/tactile cueing for activities related to strengthening, flexibility, endurance, ROM for improvements in LE, proximal hip, and core control with self care, mobility, lifting, ambulation.   [x] (36507) Provided verbal/tactile cueing for activities related to improving balance, coordination, kinesthetic sense, posture, motor skill, proprioception  to assist with LE, proximal hip, and core control in self care, mobility, lifting, ambulation and eccentric single leg control. NMR and Therapeutic Activities:    [x] (38529 or 53854) Provided verbal/tactile cueing for activities related to improving balance, coordination, kinesthetic sense, posture, motor skill, proprioception and motor activation to allow for proper function of core, proximal hip and LE with self care and ADLs  [] (09883) Gait Re-education- Provided training and instruction to the patient for proper LE, core and proximal hip recruitment and positioning and eccentric body weight control with ambulation re-education including up and down stairs     Home Exercise Program:    [x] (85300) Reviewed/Progressed HEP activities related to strengthening, flexibility, endurance, ROM of core, proximal hip and LE for functional self-care, mobility, lifting and ambulation/stair navigation   [x] (94412)Reviewed/Progressed HEP activities related to improving balance, coordination, kinesthetic sense, posture, motor skill, proprioception of core, proximal hip and LE for self care, mobility, lifting, and ambulation/stair navigation      Manual Treatments:  PROM / STM / Oscillations-Mobs:  G-I, II, III, IV (PA's, Inf., Post.)  [x] (66263) Provided manual therapy to mobilize LE, proximal hip and/or LS spine soft tissue/joints for the purpose of modulating pain, promoting relaxation,  increasing ROM, reducing/eliminating soft tissue swelling/inflammation/restriction, improving soft tissue extensibility and allowing for proper ROM for normal function with self care, mobility, lifting and ambulation.      Modalities:    [] hot packs  [x] EMS   [] Ultrasound  [x] ice CP  [] vasopneumatic  [] high volt/EGS  [] phono  [] tens    [] ionto  [] autorange/biodex [] Interferential  [] other     Charges:  Timed Code Treatment Minutes: 61'   Total Treatment Minutes: 76'     [] EVAL: L2  [x] CD(71460) x  1   [] IONTO  [x] NMR (03972) x  1   [] VASO  [x] Manual (21078) x  1    [] Other:  [x] TA x  1    [] Mech Traction (44130)  [] ES(attended) (48059)      [x] ES (un) (62050):     GOALS:  Patient stated goal: Would like to resume full pain free walking.     Therapist goals for Patient:   Short Term Goals: To be achieved in: 2 weeks  1. Independent in HEP and progression per patient tolerance, in order to prevent re-injury. 2. Patient will have a decrease in pain to facilitate improvement in movement, function, and ADLs as indicated by Functional Deficits.     Long Term Goals: To be achieved in: 12 weeks  1. Disability index score of 50% or less for the LEFS to assist with reaching prior level of function. 2. Patient will demonstrate increased AROM to 0-130 deg to allow for proper joint functioning as indicated by patients Functional Deficits. 3. Patient will demonstrate an increase in Strength to good proximal hip strength and control in LE to allow for proper functional mobility as indicated by patients Functional Deficits/ Biodex parameters for bilateral comparison/ TQBW and HQ ratios based on age/ sex/ BW corrections. 4. Patient will return to 50%> functional activities without increased symptoms or restriction. 5. Will resume pain free ADL walking, and begin a recreational walking program.                Progression Towards Functional goals:  [] Patient is progressing as expected towards functional goals listed. [] Progression is slowed due to complexities listed. [] Progression has been slowed due to co-morbidities.   [x] Plan just implemented, too soon to assess goals progression  [] Other:     ASSESSMENT:   Functional Impairments:                          [x]Noted lumbar/proximal hip/LE hypomobility                        [x]Decreased LE functional ROM                        [x]Decreased core/proximal hip strength and neuromuscular control                        [x]Decreased LE functional strength   [x]Reduced balance/proprioceptive control                        []other:       Functional Activity Limitations (from functional questionnaire and intake)                        [x]Reduced ability to tolerate prolonged functional positions                        [x]Reduced ability or difficulty with changes of positions or transfers between positions                        [x]Reduced ability to maintain good posture and demonstrate good body mechanics with sitting, bending, and lifting                        [x]Reduced ability to sleep                        [x] Reduced ability or tolerance with driving and/or computer work                        [x]Reduced ability to perform lifting, carrying tasks                        [x]Reduced ability to squat                        [x]Reduced ability to forward bend                        [x]Reduced ability to ambulate prolonged functional periods/distances/surfaces                        [x]Reduced ability to ascend/descend stairs                        [x]Reduced ability to run, hop or jump                        []other:     Participation Restrictions                        [x]Reduced participation in self care activities                        [x]Reduced participation in home management activities                        [x]Reduced participation in work activities                        [x]Reduced participation in social activities. [x]Reduced participation in sport activities.     Classification           :                         [x]Signs/symptoms consistent with post-surgical status including decreased ROM, strength and function.                         []Signs/symptoms consistent with joint sprain/strain                        [x]Signs/symptoms consistent with patella-femoral syndrome                        [x]Signs/symptoms consistent with knee OA/hip OA                        []Signs/symptoms consistent with internal derangement of knee/Hip                        [x]Signs/symptoms consistent with functional hip weakness/NMR control                                         []Signs/symptoms consistent with tendinitis/tendinosis                                             []signs/symptoms consistent with pathology which may benefit from Dry needling                                      []other:       Prognosis/Rehab Potential:                                                                     []Excellent                        [x]Good                                     []Fair                        []Poor    Treatment/Activity Tolerance:  [x] Patient tolerated treatment well [x] Patient limited by fatique  [] Patient limited by pain  [] Patient limited by other medical complications  [x] Other: Tolerated tx well. Gait is WNL but does show short step length on L at times during gait training on TM. Fatigued by end of session. Functional ADL limitations include, but not limited to knee pain/ swelling/ difficulty walking/ limited ROM/ LE muscle weakness.       Prognosis: [x] Good [] Fair  [] Poor    Patient Requires Follow-up: [x] Yes  [] No    PLAN:   [] Continue per plan of care [] Alter current plan (see comments)  [x] Plan of care initiated [] Hold pending MD visit [] Discharge  Frequency/Duration:  2-3 days per week for 12 Weeks:  Initial PO protocol for L TKR procedure  Gait training  MATT/ SLR   ROM/ patellar glides  CKC/ balance    Electronically signed by:    Mary Bearden PTA

## 2017-11-07 ENCOUNTER — OFFICE VISIT (OUTPATIENT)
Dept: ORTHOPEDIC SURGERY | Age: 67
End: 2017-11-07

## 2017-11-07 ENCOUNTER — HOSPITAL ENCOUNTER (OUTPATIENT)
Dept: PHYSICAL THERAPY | Age: 67
Discharge: HOME | End: 2017-11-07
Admitting: ORTHOPAEDIC SURGERY

## 2017-11-07 VITALS
WEIGHT: 210 LBS | SYSTOLIC BLOOD PRESSURE: 124 MMHG | HEART RATE: 73 BPM | BODY MASS INDEX: 37.21 KG/M2 | DIASTOLIC BLOOD PRESSURE: 84 MMHG | HEIGHT: 63 IN

## 2017-11-07 DIAGNOSIS — Z96.652 HISTORY OF TOTAL KNEE REPLACEMENT, LEFT: ICD-10-CM

## 2017-11-07 DIAGNOSIS — M25.561 RIGHT KNEE PAIN, UNSPECIFIED CHRONICITY: Primary | ICD-10-CM

## 2017-11-07 DIAGNOSIS — Z96.651 HISTORY OF TOTAL KNEE REPLACEMENT, RIGHT: ICD-10-CM

## 2017-11-07 PROCEDURE — 73560 X-RAY EXAM OF KNEE 1 OR 2: CPT | Performed by: ORTHOPAEDIC SURGERY

## 2017-11-07 PROCEDURE — 99024 POSTOP FOLLOW-UP VISIT: CPT | Performed by: ORTHOPAEDIC SURGERY

## 2017-11-07 NOTE — FLOWSHEET NOTE
After prop  With QS                                          10-19-17  Strength L R Comment   Quad 3-/5 4+/5 MATT 0°   Hamstring         Gastroc         Hip  flexion       Hip abd                              10-19-17  Special Test Results/Comment   Homans (-)   Temperature (-)      10-12-17  Girth L R   Mid Patella 41.2 41.0   Suprapatellar 44.4 44.2   5cm above 53.2 52.0   15cm above          Reflexes/Sensation:                         [x]Dermatomes/Myotomes intact                         [x]Reflexes equal and normal bilaterally                        []Other:     Joint mobility:                         [x]Normal                                   []Hypo                        []Hyper     Palpation: minimal tenderness     Functional Mobility/Transfers: Independent with transfer of LE from floor to table     Posture: Mild varus; prior L short (had used heel lift short term before changing shoe style)     Bandages/Dressings/Incisions: healed, mild thickening along scar     Gait:   FWB     TEST INITIAL  10-5-16 FOLLOW  UP GOAL   SINGLE LEG SQUAT TEST L: Poor (-)     R: Poor (-)   NO KNEE VALGUS, MEDIAL/LATERAL MOVEMENT, OR PELVIC TILT  GOOD, FAIR, POOR   6 MINUTE WALK TEST 0.15 miles                       M             F          60-70 y/o: >.31mile,  >.30mile  66-76 y/o: >.28 mile, >.26 mile  80-81 y/o: >.21mile,  >.20 mile   STAIR CLIMBING TEST 12.24 sec   < 13 SEC (M&F)      Score Sheet for Y Balance Test  10-5-17    Left  Right  Difference  Comments    Anterior  48 60 12     Posteromedial  96 92 +4     Posterolateral  80 80 0                 Leg Length            **Difference should be less than 4 cm for return to sport and preparticipation screening (<10% deficit compared to uninvolved)**  (Star Excursion Balance Test as a Predictor of Lower Extremity Injury in [employer])         RESTRICTIONS/PRECAUTIONS: : Fibromyalgia; R TKR 11/ 2016; sleep apnea (using CPAP)    Exercises/Interventions: hip, and core control in self care, mobility, lifting, ambulation and eccentric single leg control. NMR and Therapeutic Activities:    [x] (32754 or 62886) Provided verbal/tactile cueing for activities related to improving balance, coordination, kinesthetic sense, posture, motor skill, proprioception and motor activation to allow for proper function of core, proximal hip and LE with self care and ADLs  [] (16600) Gait Re-education- Provided training and instruction to the patient for proper LE, core and proximal hip recruitment and positioning and eccentric body weight control with ambulation re-education including up and down stairs     Home Exercise Program:    [x] (06530) Reviewed/Progressed HEP activities related to strengthening, flexibility, endurance, ROM of core, proximal hip and LE for functional self-care, mobility, lifting and ambulation/stair navigation   [x] (13810)Reviewed/Progressed HEP activities related to improving balance, coordination, kinesthetic sense, posture, motor skill, proprioception of core, proximal hip and LE for self care, mobility, lifting, and ambulation/stair navigation      Manual Treatments:  PROM / STM / Oscillations-Mobs:  G-I, II, III, IV (PA's, Inf., Post.)  [x] (63083) Provided manual therapy to mobilize LE, proximal hip and/or LS spine soft tissue/joints for the purpose of modulating pain, promoting relaxation,  increasing ROM, reducing/eliminating soft tissue swelling/inflammation/restriction, improving soft tissue extensibility and allowing for proper ROM for normal function with self care, mobility, lifting and ambulation.      Modalities:    [] hot packs  [x] EMS   [] Ultrasound  [x] ice CP  [] vasopneumatic  [] high volt/EGS  [] phono  [] tens    [] ionto  [] autorange/biodex [] Interferential  [] other     Charges:  Timed Code Treatment Minutes: 61'   Total Treatment Minutes: 76'     [] EVAL: L2   [x] TF(73256) x  2   [] IONTO  [x] NMR (92301) x  1   [] VASO  [x] []other:       Functional Activity Limitations (from functional questionnaire and intake)                        [x]Reduced ability to tolerate prolonged functional positions                        [x]Reduced ability or difficulty with changes of positions or transfers between positions                        [x]Reduced ability to maintain good posture and demonstrate good body mechanics with sitting, bending, and lifting                        [x]Reduced ability to sleep                        [x] Reduced ability or tolerance with driving and/or computer work                        [x]Reduced ability to perform lifting, carrying tasks                        [x]Reduced ability to squat                        [x]Reduced ability to forward bend                        [x]Reduced ability to ambulate prolonged functional periods/distances/surfaces                        [x]Reduced ability to ascend/descend stairs                        [x]Reduced ability to run, hop or jump                        []other:     Participation Restrictions                        [x]Reduced participation in self care activities                        [x]Reduced participation in home management activities                        [x]Reduced participation in work activities                        [x]Reduced participation in social activities. [x]Reduced participation in sport activities.     Classification           :                         [x]Signs/symptoms consistent with post-surgical status including decreased ROM, strength and function.                         []Signs/symptoms consistent with joint sprain/strain                        [x]Signs/symptoms consistent with patella-femoral syndrome                        [x]Signs/symptoms consistent with knee OA/hip OA                        []Signs/symptoms consistent with internal derangement of knee/Hip                        [x]Signs/symptoms consistent

## 2017-11-07 NOTE — PROGRESS NOTES
Chief Complaint    Post-Op Check (3 week check on left knee) and Follow-up (1 yr check right knee)      History of Present Illness:  Ziyad Aguilar is a 74 y.o. female  Pain Assessment  Location of Pain: Knee  Location Modifiers: Right  Severity of Pain: 0  Quality of Pain: Aching  Frequency of Pain: Constant  Relieving Factors: Ice, Rest  Result of Injury: No  Work-Related Injury: No  Are there other pain locations you wish to document?: No     Patient is being seen for a post-op check following left TKR. Patient is post-op 3 weeks (10/9/17) and post-op from right TKR 1 year (11/9/17) and is doing very well overall. Patient denies any fevers, chills and negative for DVT. Patient does state she will seldomly get a catching sensation about the posterolateral aspect of her right knee, that does cause sharp pain, but does not linger or interfere with ADLs. Patient is very happy overall      Medical History:  Patient's medications, allergies, past medical, surgical, social and family histories were reviewed and updated as appropriate.     Allergies   Allergen Reactions    Cortisone Acetate Other (See Comments)     agitation     Current Outpatient Prescriptions on File Prior to Visit   Medication Sig Dispense Refill    aspirin 81 MG tablet Take 81 mg by mouth 2 times daily      LYRICA 75 MG capsule TAKE 1 CAPSULE BY MOUTH 3 TIMES DAILY 270 capsule 1    DULoxetine (CYMBALTA) 20 MG extended release capsule Take 1 capsule by mouth 2 times daily 180 capsule 1    omeprazole (PRILOSEC) 40 MG delayed release capsule TAKE ONE CAPSULE BY MOUTH ONCE DAILY 90 capsule 3    fluticasone (FLONASE) 50 MCG/ACT nasal spray 2 sprays by Nasal route daily 3 Bottle 3    calcium carbonate (TUMS) 500 MG chewable tablet Take 1 tablet by mouth daily      Loratadine (CLARITIN PO) Take 10 mg by mouth daily as needed       Nutritional Supplements (JUICE PLUS FIBRE PO) Take by mouth daily      VITAMIN E PO Take 400 Units by mouth as needed spacing and prosthetic fixation. No signs of loosening or fractures. AP and Lateral view of left knee done at hospital on 10/9/17. Radiographs demonstrate excellent joint positioning, spacing and prosthetic fixation. No signs of loosening or fractures           Impression:  Encounter Diagnoses   Name Primary?  Right knee pain, unspecified chronicity Yes    History of total knee replacement, right     History of total knee replacement, left        Treatment Plan: This patient is in the early PO surgery phase on her left TKR and continues to do well WO complications. The surgical site continues in the healing period WO signs of infection, drainage or erythema. DVT protocol reviewed and neg calf pain, edema or Holmans. The rehab protocol reviewed and continued OP and home program with ROM and strength exercises W progressive WB. All questions answered. Continue rehab protocol and FU. Follow up: 6 month TKR FU on left. PRN      PRESLEY Sims MS, ATC, am scribing for and in the presence of Dr. Michelle Samaniego     9:28 AM    Gunner Sims MS, ATC  This dictation was performed with a verbal recognition program [facility]) and it was checked for errors. It is possible that there are still dictated errors within this office note. If so, please bring any errors to my attention for an addendum. All efforts were made to ensure that this office note is accurate. Supervising Physician Attestation:  I, Dr. Lois Dennis, personally performed the services described in this documentation as scribed above, and it is both accurate and complete and I agree with all pertinent clinical information. I personally interviewed the patient and performed a physical examination and medical decision making. I discussed the patient's condition and treatment options and have  reviewed and agree with the past medical, family and social history unless otherwise noted. All of the patient's questions were answered.       Board Certified Orthopaedic Surgeon  [facility] and [address]  President and [address] and [employer]  Professor of [employer]

## 2017-11-08 ENCOUNTER — TELEPHONE (OUTPATIENT)
Dept: FAMILY MEDICINE CLINIC | Age: 67
End: 2017-11-08

## 2017-11-08 ENCOUNTER — OFFICE VISIT (OUTPATIENT)
Dept: PULMONOLOGY | Age: 67
End: 2017-11-08

## 2017-11-08 VITALS
HEART RATE: 68 BPM | OXYGEN SATURATION: 99 % | BODY MASS INDEX: 35.68 KG/M2 | WEIGHT: 209 LBS | HEIGHT: 64 IN | DIASTOLIC BLOOD PRESSURE: 80 MMHG | SYSTOLIC BLOOD PRESSURE: 116 MMHG

## 2017-11-08 DIAGNOSIS — E66.9 NON MORBID OBESITY, UNSPECIFIED OBESITY TYPE: Chronic | ICD-10-CM

## 2017-11-08 DIAGNOSIS — F32.89 OTHER DEPRESSION: Chronic | ICD-10-CM

## 2017-11-08 DIAGNOSIS — M79.7 FIBROMYALGIA: Chronic | ICD-10-CM

## 2017-11-08 DIAGNOSIS — G47.33 OBSTRUCTIVE SLEEP APNEA SYNDROME: Primary | ICD-10-CM

## 2017-11-08 PROCEDURE — 1090F PRES/ABSN URINE INCON ASSESS: CPT | Performed by: NURSE PRACTITIONER

## 2017-11-08 PROCEDURE — 3017F COLORECTAL CA SCREEN DOC REV: CPT | Performed by: NURSE PRACTITIONER

## 2017-11-08 PROCEDURE — 99214 OFFICE O/P EST MOD 30 MIN: CPT | Performed by: NURSE PRACTITIONER

## 2017-11-08 PROCEDURE — 3014F SCREEN MAMMO DOC REV: CPT | Performed by: NURSE PRACTITIONER

## 2017-11-08 PROCEDURE — 4040F PNEUMOC VAC/ADMIN/RCVD: CPT | Performed by: NURSE PRACTITIONER

## 2017-11-08 PROCEDURE — 1036F TOBACCO NON-USER: CPT | Performed by: NURSE PRACTITIONER

## 2017-11-08 PROCEDURE — 1123F ACP DISCUSS/DSCN MKR DOCD: CPT | Performed by: NURSE PRACTITIONER

## 2017-11-08 PROCEDURE — G8399 PT W/DXA RESULTS DOCUMENT: HCPCS | Performed by: NURSE PRACTITIONER

## 2017-11-08 PROCEDURE — G8427 DOCREV CUR MEDS BY ELIG CLIN: HCPCS | Performed by: NURSE PRACTITIONER

## 2017-11-08 PROCEDURE — 1111F DSCHRG MED/CURRENT MED MERGE: CPT | Performed by: NURSE PRACTITIONER

## 2017-11-08 PROCEDURE — G8417 CALC BMI ABV UP PARAM F/U: HCPCS | Performed by: NURSE PRACTITIONER

## 2017-11-08 PROCEDURE — G8484 FLU IMMUNIZE NO ADMIN: HCPCS | Performed by: NURSE PRACTITIONER

## 2017-11-08 RX ORDER — AMOXICILLIN 500 MG/1
500 CAPSULE ORAL 3 TIMES DAILY
Qty: 30 CAPSULE | Refills: 0 | Status: SHIPPED | OUTPATIENT
Start: 2017-11-08 | End: 2018-03-08 | Stop reason: ALTCHOICE

## 2017-11-08 ASSESSMENT — SLEEP AND FATIGUE QUESTIONNAIRES
HOW LIKELY ARE YOU TO NOD OFF OR FALL ASLEEP WHILE LYING DOWN TO REST IN THE AFTERNOON WHEN CIRCUMSTANCES PERMIT: 0
HOW LIKELY ARE YOU TO NOD OFF OR FALL ASLEEP WHILE WATCHING TV: 0
ESS TOTAL SCORE: 0
HOW LIKELY ARE YOU TO NOD OFF OR FALL ASLEEP WHILE SITTING QUIETLY AFTER LUNCH WITHOUT ALCOHOL: 0
HOW LIKELY ARE YOU TO NOD OFF OR FALL ASLEEP WHILE SITTING AND TALKING TO SOMEONE: 0
HOW LIKELY ARE YOU TO NOD OFF OR FALL ASLEEP WHILE SITTING INACTIVE IN A PUBLIC PLACE: 0
HOW LIKELY ARE YOU TO NOD OFF OR FALL ASLEEP IN A CAR, WHILE STOPPED FOR A FEW MINUTES IN TRAFFIC: 0
HOW LIKELY ARE YOU TO NOD OFF OR FALL ASLEEP WHEN YOU ARE A PASSENGER IN A CAR FOR AN HOUR WITHOUT A BREAK: 0
HOW LIKELY ARE YOU TO NOD OFF OR FALL ASLEEP WHILE SITTING AND READING: 0

## 2017-11-08 ASSESSMENT — ENCOUNTER SYMPTOMS
ABDOMINAL DISTENTION: 0
SHORTNESS OF BREATH: 0
RHINORRHEA: 0
SINUS PRESSURE: 0
APNEA: 0
COUGH: 0
ABDOMINAL PAIN: 0

## 2017-11-08 NOTE — PROGRESS NOTES
Allison Ochoa MD, FAASM, [facility]  Mariluz Navarro, MSN, RN, CNP [address]  3rd [facility], [address] [facility], [address] [phone]   [address] [place] [address] Dr Jimenez . Amanda Bowersa 37 [phone]       [address] MEDICINE    Subjective:     Patient ID: Vikki Berry is a 74 y.o. female. Chief Complaint   Patient presents with    Sleep Apnea       HPI:      Had study formal in lab study done on 8/9/2017 which showed an AHI - 101.1/hr with Low SaO2 - 86% and time below 90% of 2min. This is consistent with severe CHELSI (327.23)    Titration done on: 8/25/2017    Machine Present in office today: Yes     CPAP settings: Auto CPAP mode Pmin-10 Pmax-18 Flex-3 Hum-1  Average P- 11.4 Comp 6.25 hrs/night  Download AHI - 2.3/hr  Mask- Nuance Pro (M)  Machine download/SD card data reviewed and documented. [place] - Total score: 0    Follow-up :     Last Visit : July 2017    Per patient the listed Co-morbidities are well controlled and stable at this time. Subjective Health Changes: Knee Surgery October 2017     Follow-up :      Patient is compliant with the machine  Yes    Feeling rested when using the machine   Yes     Pressure is comfortable with inspiration and expiration  Yes     Noticed changes in pressure   na   Mask is fitting well  Yes   Noting Mask Air Leak  No   Having painful Aerophagia  No   Nocturia   0 per night. Having  HA upon waking  No   Dry mouth upon waking   No   Congestion upon waking   No   Nose Bleeds  No   Using Sleep Aides  no   Understands how to change humidification and/or tubing temperature for comfort while at home  Yes .      Difficulties falling asleep  No   Difficulties staying asleep  No   Approximate time to bed  8-9pm   Approximate wake time  7-8am   Taking Naps  no   If taking naps usual length  na   If taking naps using the machine  na   Drowsy when driving  No   Does patient carry a DOT/CDL No   Does patient carry FAA/Pilots License   No    Any concerns noted with the machine at this time   No       Review of Systems   Constitutional: Negative for appetite change, chills, fatigue and fever. HENT: Negative for congestion, nosebleeds, rhinorrhea and sinus pressure. Respiratory: Negative for apnea, cough and shortness of breath. Cardiovascular: Negative for chest pain and palpitations. Gastrointestinal: Negative for abdominal distention and abdominal pain. Neurological: Negative for dizziness and headaches. Social History     Social History    Marital status:      Spouse name: N/A    Number of children: N/A    Years of education: N/A     Occupational History    Not on file. Social History Main Topics    Smoking status: Never Smoker    Smokeless tobacco: Never Used    Alcohol use 3.6 oz/week     6 Cans of beer per week    Drug use: No    Sexual activity: Yes     Partners: Male     Other Topics Concern    Not on file     Social History Narrative    No narrative on file       Prior to Admission medications    Medication Sig Start Date End Date Taking?  Authorizing Provider   aspirin 81 MG tablet Take 81 mg by mouth 2 times daily   Yes Historical Provider, MD   LYRICA 75 MG capsule TAKE 1 CAPSULE BY MOUTH 3 TIMES DAILY 10/9/17  Yes Clarisa Uriarte DO   DULoxetine (CYMBALTA) 20 MG extended release capsule Take 1 capsule by mouth 2 times daily 3/27/17  Yes Clarisa Uriarte DO   omeprazole (PRILOSEC) 40 MG delayed release capsule TAKE ONE CAPSULE BY MOUTH ONCE DAILY 3/27/17  Yes Clarisa Uriarte DO   fluticasone Zayra Newer) 50 MCG/ACT nasal spray 2 sprays by Nasal route daily 3/27/17  Yes Clarisa Uriarte DO   calcium carbonate (TUMS) 500 MG chewable tablet Take 1 tablet by mouth daily   Yes Historical Provider, MD   Loratadine (CLARITIN PO) Take 10 mg by mouth daily as needed    Yes Historical Provider, MD   Nutritional Supplements (JUICE PLUS FIBRE PO) Take by mouth daily   Yes Historical Provider, MD   VITAMIN E PO Take 400 Units by mouth as needed (hot flashes)    Yes Historical Provider, MD       Allergies as of 11/08/2017 - Review Complete 11/08/2017   Allergen Reaction Noted    Cortisone acetate Other (See Comments) 11/18/2010       Patient Active Problem List   Diagnosis    Fibromyalgia    Seasonal allergies    Trigger finger    Depression    Osteoarthritis of both knees    Trigger middle finger of left hand    Plantar fasciitis, right    Primary osteoarthritis of right knee    Status post total right knee replacement    Obstructive sleep apnea       Past Medical History:   Diagnosis Date    Anxiety     Arthritis     knee    Back pain     Depression     Fibromyalgia     Hemorrhoids     Obesity     PONV (postoperative nausea and vomiting)     Reflux     Sleep apnea     uses CPAP    TMJ syndrome     Vision impairment     glasses       Past Surgical History:   Procedure Laterality Date    BREAST SURGERY      reduction    BROW LIFT Bilateral 11/12/13    FINGER TRIGGER RELEASE  12/9/11    left thumb and cyst    FINGER TRIGGER RELEASE Bilateral     4th finger    FINGER TRIGGER RELEASE Right 7/6/2015    index and long    FINGER TRIGGER RELEASE Left 2/22/16    FOOT SURGERY  1980's    Bilat.  feet /toes shaved\"    JOINT REPLACEMENT Right 11/09/2016    knee    JOINT REPLACEMENT Left 10/09/2017    knee    KNEE ARTHROSCOPY  11-    Left knee video arthroscopy, partial medial and lateral meniscectomy    KNEE SURGERY  3/17/2011    Video arthroscopy withpartial medial menisectomy rt. knee    MOUTH SURGERY      OTHER SURGICAL HISTORY Left     dequervains    OTHER SURGICAL HISTORY  10/09/2017    LEFT TOTAL KNEE ARTHROPLASTY              TOTAL KNEE ARTHROPLASTY Right 11/09/2016    TUBAL LIGATION         Family History   Problem Relation Age of Onset    Cancer Father     Diabetes Sister     Other Sister      CHELSI    Stroke Mother     Cancer Brother

## 2017-11-09 ENCOUNTER — HOSPITAL ENCOUNTER (OUTPATIENT)
Dept: PHYSICAL THERAPY | Age: 67
Discharge: HOME | End: 2017-11-09
Admitting: ORTHOPAEDIC SURGERY

## 2017-11-09 NOTE — PLAN OF CARE
[facility]     Physical Therapy Re-Certification Plan of Care    Dear Dr. Shahnaz Huffman,     We had the pleasure of treating the following patient for physical therapy services at [address] [place] [address]. A summary of our findings can be found in the updated assessment below. This includes our plan of care. If you have any questions or concerns regarding these findings, please do not hesitate to contact me at [phone]. Thank you for the referral.     Physician Signature:________________________________Date:__________________  By signing above (or electronic signature), therapists plan is approved by physician      Overall Response to Treatment:   [x]Patient is responding well to treatment and improvement is noted with regards to goals   []Patient should continue to improve in reasonable time if they continue HEP   []Patient has plateaued and is no longer responding to skilled PT intervention    []Patient is getting worse and would benefit from return to referring MD   []Patient unable to adhere to initial POC   []Other:    Date range of Visits: 10/5/17- 17 (DOS: 10/9/17)  Total Visits:10        Physical Therapy Daily Treatment Note  Date:  2017    Patient Name:  Margie Dey    :  1950  MRN: [id number]  Restrictions/Precautions:    Medical/Treatment Diagnosis Information:  Diagnosis: M17.12 (ICD-10-CM) - Primary osteoarthritis of left knee  Treatment Diagnosis: Knee pain/ swelling/ difficulty walking/ limited ROM/ LE muscle weakness  S/P L knee TKR  DOS: 10/9/17  Current PO meds:; Ibuprofen PRN; ASA 81 mg 1 tab PRN ;    Patient seen in consultation with Dr. Shahnaz Huffman who established the initial/subsequent treatment protocol.   10-12-17: doing well, confirmed 81 mg ASA BID  17: doing well; pleased with progress; continue per protocol    Insurance/Certification information:  [employer] Medicare  Physician []Other:     Joint mobility:                         [x]Normal                                   []Hypo                        []Hyper     Palpation: minimal tenderness     Functional Mobility/Transfers: Independent with transfer of LE from floor to table     Posture: Mild varus; prior L short (had used heel lift short term before changing shoe style)     Bandages/Dressings/Incisions: healed, mild thickening along scar     Gait:   FWB     TEST INITIAL  10-5-16 FOLLOW  UP GOAL   SINGLE LEG SQUAT TEST L: Poor (-)     R: Poor (-)   NO KNEE VALGUS, MEDIAL/LATERAL MOVEMENT, OR PELVIC TILT  GOOD, FAIR, POOR   6 MINUTE WALK TEST 0.15 miles                       M             F          60-68 y/o: >.31mile,  >.30mile  66-78 y/o: >.28 mile, >.26 mile  80-81 y/o: >.21mile,  >.20 mile   STAIR CLIMBING TEST 12.24 sec   < 13 SEC (M&F)      Score Sheet for Y Balance Test  10-5-17    Left  Right  Difference  Comments    Anterior  48 60 12     Posteromedial  96 92 +4     Posterolateral  80 80 0                 Leg Length            **Difference should be less than 4 cm for return to sport and preparticipation screening (<10% deficit compared to uninvolved)**  (Star Excursion Balance Test as a Predictor of Lower Extremity Injury in [employer] Players)         RESTRICTIONS/PRECAUTIONS: : Fibromyalgia; R TKR 11/ 2016; sleep apnea (using CPAP)    Exercises/Interventions:   Exercise/Equipment Resistance/Repetitions Other comments 11/9/2017   Stretching      Hamstring 30\"x 5 Towel prop x   Hip Flexion      ITB      Grion      Quad      Inclined Calf      Towel Pull 30\"x 5 Towel prop x         SLR      Supine 3 x 10 2# x   Prone      Abduction 3 x 10 2# x   Adducton 2 x 10 x 10\" PS; extended x   SLR+            Isometrics      Quad sets 2 x 10 x 10\" A/S x         Patellar Glides      Medial 3'  x   Superior 3'  x   Inferior 3'  x         ROM      Passive 10' ERMI x   Active x10 KE x   Weight Shift tolerance with driving and/or computer work                        [x]Reduced ability to perform lifting, carrying tasks                        [x]Reduced ability to squat                        [x]Reduced ability to forward bend                        [x]Reduced ability to ambulate prolonged functional periods/distances/surfaces                        [x]Reduced ability to ascend/descend stairs                        [x]Reduced ability to run, hop or jump                        []other:     Participation Restrictions                        [x]Reduced participation in self care activities                        [x]Reduced participation in home management activities                        [x]Reduced participation in work activities                        [x]Reduced participation in social activities. [x]Reduced participation in sport activities.     Classification           :                         [x]Signs/symptoms consistent with post-surgical status including decreased ROM, strength and function.                         []Signs/symptoms consistent with joint sprain/strain                        [x]Signs/symptoms consistent with patella-femoral syndrome                        [x]Signs/symptoms consistent with knee OA/hip OA                        []Signs/symptoms consistent with internal derangement of knee/Hip                        [x]Signs/symptoms consistent with functional hip weakness/NMR control                                         []Signs/symptoms consistent with tendinitis/tendinosis                                             []signs/symptoms consistent with pathology which may benefit from Dry needling                                      []other:       Prognosis/Rehab Potential:                                                                     []Excellent                        [x]Good                                     []Fair

## 2017-11-14 ENCOUNTER — HOSPITAL ENCOUNTER (OUTPATIENT)
Dept: PHYSICAL THERAPY | Age: 67
Discharge: HOME | End: 2017-11-14
Admitting: ORTHOPAEDIC SURGERY

## 2017-11-14 NOTE — FLOWSHEET NOTE
(10/5/17; Preop)  LEFS Score: 42/80=53%  (11-2-17)     11-14-17            ROM PROM AROM Overpressure Comment     L R L R L R     Flexion 140° 140°          ERMI   Extension -2°  0° 0°          After prop  With QS                                          11-9-17  Strength L R Comment   Quad 4/5 5-/5 MATT 0°   Hamstring         Gastroc         Hip  flexion       Hip abd                              11-9-17  Special Test Results/Comment   Homans (-)   Temperature (-)      11-9-17  Girth L R   Mid Patella 42.0 40.8   Suprapatellar 44.5 42.9   5cm above 51.9 49.4   15cm above          Reflexes/Sensation:                         [x]Dermatomes/Myotomes intact                         [x]Reflexes equal and normal bilaterally                        []Other:     Joint mobility:                         [x]Normal                                   []Hypo                        []Hyper     Palpation: minimal tenderness     Functional Mobility/Transfers: Independent with transfer of LE from floor to table     Posture: Mild varus; prior L short (had used heel lift short term before changing shoe style)     Bandages/Dressings/Incisions: healed, mild thickening along scar     Gait:   FWB     TEST INITIAL  10-5-16 FOLLOW  UP GOAL   SINGLE LEG SQUAT TEST L: Poor (-)     R: Poor (-)   NO KNEE VALGUS, MEDIAL/LATERAL MOVEMENT, OR PELVIC TILT  GOOD, FAIR, POOR   6 MINUTE WALK TEST 0.15 miles                       M             F          60-70 y/o: >.31mile,  >.30mile  66-78 y/o: >.28 mile, >.26 mile  80-79 y/o: >.21mile,  >.20 mile   STAIR CLIMBING TEST 12.24 sec   < 13 SEC (M&F)      Score Sheet for Y Balance Test  10-5-17    Left  Right  Difference  Comments    Anterior  48 60 12     Posteromedial  96 92 +4     Posterolateral  80 80 0                 Leg Length            **Difference should be less than 4 cm for return to sport and preparticipation screening (<10% deficit compared to uninvolved)**  (Star Excursion Balance Test as a Predictor of Lower Extremity Injury in High School Basketball Players)         RESTRICTIONS/PRECAUTIONS: : Fibromyalgia; R TKR 11/ 2016; sleep apnea (using CPAP)    Exercises/Interventions:   Exercise/Equipment Resistance/Repetitions Other comments 11/14/2017   Stretching      Hamstring 30\"x 5 Towel prop x   Hip Flexion      ITB      Grion      Quad      Inclined Calf 30''x5  x   Towel Pull 30\"x 5 Towel prop x         SLR      Supine 3 x 10 2# x   Prone      Abduction 3 x 10 2# x   Adducton 2 x 10 x 10\" PS; extended x   SLR+            Isometrics      Quad sets 2 x 10 x 10\" A/S x         Patellar Glides      Medial 3'  x   Superior 3'  x   Inferior 3'  x         ROM      Passive 5' ERMI x   Active x10 KE x   Weight Shift      Hang Weights 10' Propped x   Sheet Pulls      Ankle Pumps 5' Hourly x         CKC      Calf raises 3 x 10  x   Wall sits      Step ups 3x 10 6\"; FSU x   Tandem stance 3x30'' ea  x   Squatting 20-30x Chair--sit<>stand x   CC TKE 3 x 10 Black x   Balance 4' RC L9 x         PRE      Extension 3x10 RANGE: 30-0, 3# x   Flexion 3x10 RANGE: 0-90, 3# x         Cable Column            Leg Press  RANGE:          Bike 10' REC-revs; R2.0 x   Treadmill 6' Gait training 1.5 mph x   Gait training 5' Cup walk with 1 AD *     Other Therapeutic Activities:   WBAT-> normalize gait  RAGHAV hose  Cold Therapy    Home Exercise Program:    See above and attached. Initial HEP discussed and completed. Full written, verbal, and demonstration provided. Patient Education:      Full postop instructions provided. Written and verbal guidelines provided for but not limited to: DME/ HEP/ ICE/ gait/ general medical instructions.     Manual Treatments:   Patella mobs   9'  STM    5'      Therapeutic Exercise and NMR EXR  [x] (10588) Provided verbal/tactile cueing for activities related to strengthening, flexibility, endurance, ROM for improvements in LE, proximal hip, and core control with self care, mobility, lifting, autorange/biodex [] Interferential  [] other     Charges:  Timed Code Treatment Minutes: 61'   Total Treatment Minutes: 76'     [] EVAL: L2   [x] ST(65103) x  2   [] IONTO  [x] NMR (87595) x  1   [] VASO  [x] Manual (66914) x  1    [] Other:  [] TA x       [] Mech Traction (35627)  [] ES(attended) (99632)      [x] ES (un) (98117):     GOALS:  Patient stated goal: Would like to resume full pain free walking.     Therapist goals for Patient:   Short Term Goals: To be achieved in: 2 weeks  1. Independent in HEP and progression per patient tolerance, in order to prevent re-injury. 2. Patient will have a decrease in pain to facilitate improvement in movement, function, and ADLs as indicated by Functional Deficits.     Long Term Goals: To be achieved in: 12 weeks  1. Disability index score of 50% or less for the LEFS to assist with reaching prior level of function. 2. Patient will demonstrate increased AROM to 0-130 deg to allow for proper joint functioning as indicated by patients Functional Deficits. 3. Patient will demonstrate an increase in Strength to good proximal hip strength and control in LE to allow for proper functional mobility as indicated by patients Functional Deficits/ Biodex parameters for bilateral comparison/ TQBW and HQ ratios based on age/ sex/ BW corrections. 4. Patient will return to 50%> functional activities without increased symptoms or restriction. 5. Will resume pain free ADL walking, and begin a recreational walking program.                Progression Towards Functional goals:  [] Patient is progressing as expected towards functional goals listed. [] Progression is slowed due to complexities listed. [] Progression has been slowed due to co-morbidities.   [x] Plan just implemented, too soon to assess goals progression  [] Other:     ASSESSMENT:   Functional Impairments:                          [x]Noted lumbar/proximal hip/LE hypomobility                        [x]Decreased LE syndrome                        [x]Signs/symptoms consistent with knee OA/hip OA                        []Signs/symptoms consistent with internal derangement of knee/Hip                        [x]Signs/symptoms consistent with functional hip weakness/NMR control                                         []Signs/symptoms consistent with tendinitis/tendinosis                                             []signs/symptoms consistent with pathology which may benefit from Dry needling                                      []other:       Prognosis/Rehab Potential:                                                                     []Excellent                        [x]Good                                     []Fair                        []Poor    Treatment/Activity Tolerance:  [x] Patient tolerated treatment well [x] Patient limited by fatique  [] Patient limited by pain  [] Patient limited by other medical complications  [x] Other: Pt has been progressing well. Achieves muscle fatigue by end of session. Weakness noted with step ups and mild compensations. Functional ADL limitations include, but not limited to knee pain/ swelling/ difficulty walking/ limited ROM/ LE muscle weakness. Prognosis: [x] Good [] Fair  [] Poor    Patient Requires Follow-up: [x] Yes  [] No    PLAN:   [] Continue per plan of care [x] Alter current plan (see comments)  [] Plan of care initiated [] Hold pending MD visit [] Discharge  Frequency/Duration:  1 days per week for 6 Weeks:  Decrease PT frequency to 1x/wk starting next week.   Progressive PO protocol for L TKR procedure  Gait training  MATT/ SLR   ROM/ patellar glides  CKC/ balance    Electronically signed by:  Sujatha Conner PTA

## 2017-11-15 ENCOUNTER — TELEPHONE (OUTPATIENT)
Dept: SLEEP MEDICINE | Age: 67
End: 2017-11-15

## 2017-11-16 ENCOUNTER — HOSPITAL ENCOUNTER (OUTPATIENT)
Dept: PHYSICAL THERAPY | Age: 67
Discharge: HOME | End: 2017-11-16
Admitting: ORTHOPAEDIC SURGERY

## 2017-11-16 NOTE — FLOWSHEET NOTE
[facility] [employer] [employer]  Orthopaedics and Sports Rehabilitation, [facility]      Physical Therapy Daily Treatment Note  Date:  2017    Patient Name:  Consuelo Leyva    :  1950  MRN: [id number]  Restrictions/Precautions:    Medical/Treatment Diagnosis Information:  Diagnosis: M17.12 (ICD-10-CM) - Primary osteoarthritis of left knee  Treatment Diagnosis: Knee pain/ swelling/ difficulty walking/ limited ROM/ LE muscle weakness  S/P L knee TKR  DOS: 10/9/17  Current PO meds:; Ibuprofen PRN; ASA 81 mg 1 tab PRN ;    Patient seen in consultation with Dr. Cheryle Gala who established the initial/subsequent treatment protocol.   10-12-17: doing well, confirmed 81 mg ASA BID  17: doing well; pleased with progress; continue per protocol--seen upstairs     Insurance/Certification information:  [employer]  Physician Information:   Ishan Chew MD  Plan of care signed (Y/N): Y    Date of Patient follow up with Physician: FU appointments for PO MD/ PT at  weeks    G-Code (if applicable):      Date G-Code Applied:  17       Progress Note: [x]  Yes  []  No  Next due by: Visit #20       Latex Allergy:  [x]NO      []YES  Preferred Language for Healthcare:   [x]English       []other:    Visit # Insurance Allowable   12 Medical Necessity     Pain Scale: 0/10 @ rest            1/10 @ worst  Easing factors: Rest/ ice/ meds  Provocative factors:WB activity: walking> standing; stairs; kneeling; squatting (return> down)   Type: []Constant                     [x]Intermittent                []Radiating                   []Localized                   []other:                            SUBJECTIVE: Pt doing well, did have a couple spots where suture tried to back out so put a Band-Aid with neosporin on it last night and was closed up this morning.    (-) giving way  (-) night pain  (+) popping/ bilateral \"prosthesis click\"  (-)catching/locking  (+) swelling  (+/-) stiffness  (+) stairs/ down> up  (+) preparticipation screening (<10% deficit compared to uninvolved)**  (Star Excursion Balance Test as a Predictor of Lower Extremity Injury in High School Basketball Players)         RESTRICTIONS/PRECAUTIONS: : Fibromyalgia; R TKR 11/ 2016; sleep apnea (using CPAP)    Exercises/Interventions:   Exercise/Equipment Resistance/Repetitions Other comments 11/16/2017   Stretching      Hamstring 30\"x 5 Towel prop x   Hip Flexion      ITB      Grion      Quad      Inclined Calf 30''x 5  x   Towel Pull 30\"x 5 Towel prop x         SLR      Supine 3 x 10 2# x   Prone      Abduction 3 x 10 2# x   Adducton 2 x 10 x 10\" PS; extended x   SLR+            Isometrics      Quad sets 2 x 10 x 10\" A/S x         Patellar Glides      Medial 3'  x   Superior 3'  x   Inferior 3'  x         ROM      Passive 5' ERMI x   Active x10 KE x   Weight Shift      Hang Weights 10' Propped x   Sheet Pulls      Ankle Pumps 5' Hourly x         CKC      Calf raises 3 x 10  x   Wall sits      Step ups 3 x 10 6\"; FSU x   Tandem stance 3 x 30'' ea  x   Squatting 20-30x Chair--sit<>stand x   CC TKE 3 x 10 Black x   Balance 4' RC L9 x         PRE      Extension 3 x 10 RANGE: 30-0, 3# x   Flexion 3 x 10 RANGE: 0-90, 3# x         Cable Column            Leg Press  RANGE:          Bike 10' REC-revs; R2.0 x   Treadmill 6' Gait training 1.5 mph x   Gait training 5' Cup walk with 1 AD *     Other Therapeutic Activities:   WBAT-> normalize gait  RAGHAV hose  Cold Therapy    Home Exercise Program:    See above and attached. Initial HEP discussed and completed. Full written, verbal, and demonstration provided. Patient Education:      Full postop instructions provided. Written and verbal guidelines provided for but not limited to: DME/ HEP/ ICE/ gait/ general medical instructions.     Manual Treatments:   Patella mobs   9'  STM    5'      Therapeutic Exercise and NMR EXR  [x] (92490) Provided verbal/tactile cueing for activities related to strengthening, flexibility, endurance, ROM for improvements in LE, proximal hip, and core control with self care, mobility, lifting, ambulation. [x] (72815) Provided verbal/tactile cueing for activities related to improving balance, coordination, kinesthetic sense, posture, motor skill, proprioception  to assist with LE, proximal hip, and core control in self care, mobility, lifting, ambulation and eccentric single leg control. NMR and Therapeutic Activities:    [x] (64919 or 38091) Provided verbal/tactile cueing for activities related to improving balance, coordination, kinesthetic sense, posture, motor skill, proprioception and motor activation to allow for proper function of core, proximal hip and LE with self care and ADLs  [] (13959) Gait Re-education- Provided training and instruction to the patient for proper LE, core and proximal hip recruitment and positioning and eccentric body weight control with ambulation re-education including up and down stairs     Home Exercise Program:    [x] (58307) Reviewed/Progressed HEP activities related to strengthening, flexibility, endurance, ROM of core, proximal hip and LE for functional self-care, mobility, lifting and ambulation/stair navigation   [x] (16077)Reviewed/Progressed HEP activities related to improving balance, coordination, kinesthetic sense, posture, motor skill, proprioception of core, proximal hip and LE for self care, mobility, lifting, and ambulation/stair navigation      Manual Treatments:  PROM / STM / Oscillations-Mobs:  G-I, II, III, IV (PA's, Inf., Post.)  [x] (92207) Provided manual therapy to mobilize LE, proximal hip and/or LS spine soft tissue/joints for the purpose of modulating pain, promoting relaxation,  increasing ROM, reducing/eliminating soft tissue swelling/inflammation/restriction, improving soft tissue extensibility and allowing for proper ROM for normal function with self care, mobility, lifting and ambulation.      Modalities:    [] hot packs  [x] EMS   [] sprain/strain                        [x]Signs/symptoms consistent with patella-femoral syndrome                        [x]Signs/symptoms consistent with knee OA/hip OA                        []Signs/symptoms consistent with internal derangement of knee/Hip                        [x]Signs/symptoms consistent with functional hip weakness/NMR control                                         []Signs/symptoms consistent with tendinitis/tendinosis                                             []signs/symptoms consistent with pathology which may benefit from Dry needling                                      []other:       Prognosis/Rehab Potential:                                                                     []Excellent                        [x]Good                                     []Fair                        []Poor    Treatment/Activity Tolerance:  [x] Patient tolerated treatment well [x] Patient limited by fatique  [] Patient limited by pain  [] Patient limited by other medical complications  [x] Other: Pt tolerated all well, able to max out ERMI on first rep without tightness or pain. Good tolerance to strengthening exercises but still challenged with balance. Prognosis: [x] Good [] Fair  [] Poor    Patient Requires Follow-up: [x] Yes  [] No    PLAN:   [] Continue per plan of care [x] Alter current plan (see comments)  [] Plan of care initiated [] Hold pending MD visit [] Discharge  Frequency/Duration:  1 days per week for 6 Weeks:  Decrease PT frequency to 1x/wk starting next week.     Progressive PO protocol for L TKR procedure  Gait training  MATT/ SLR   ROM/ patellar glides  CKC/ balance    Electronically signed by:    Genie Michaud PT

## 2017-11-21 ENCOUNTER — HOSPITAL ENCOUNTER (OUTPATIENT)
Dept: PHYSICAL THERAPY | Age: 67
Discharge: HOME | End: 2017-11-21
Admitting: ORTHOPAEDIC SURGERY

## 2017-11-21 NOTE — FLOWSHEET NOTE
80= 49% (10/5/17; Preop)  LEFS Score: 42/80=53%  (11/2/17)     11-21-17            ROM PROM AROM Overpressure Comment     L R L R L R     Flexion 140° 140°          ERMI   Extension -2°  0° 0°          After prop  With QS                                          11-9-17  Strength L R Comment   Quad 4/5 5-/5 MATT 0°   Hamstring         Gastroc         Hip  flexion       Hip abd                              11-16-17  Special Test Results/Comment   Homans (-)   Temperature (-)      11-9-17  Girth L R   Mid Patella 42.0 40.8   Suprapatellar 44.5 42.9   5cm above 51.9 49.4   15cm above          Reflexes/Sensation:                         [x]Dermatomes/Myotomes intact                         [x]Reflexes equal and normal bilaterally                        []Other:     Joint mobility:                         [x]Normal                                   []Hypo                        []Hyper     Palpation: minimal tenderness     Functional Mobility/Transfers: Independent with transfer of LE from floor to table     Posture: Mild varus; prior L short (had used heel lift short term before changing shoe style)     Bandages/Dressings/Incisions: healed, mild thickening along scar     Gait:   FWB     TEST INITIAL  10-5-16 FOLLOW  UP GOAL   SINGLE LEG SQUAT TEST L: Poor (-)     R: Poor (-)   NO KNEE VALGUS, MEDIAL/LATERAL MOVEMENT, OR PELVIC TILT  GOOD, FAIR, POOR   6 MINUTE WALK TEST 0.15 miles                       M             F          60-70 y/o: >.31mile,  >.30mile  66-76 y/o: >.28 mile, >.26 mile  80-81 y/o: >.21mile,  >.20 mile   STAIR CLIMBING TEST 12.24 sec   < 13 SEC (M&F)      Score Sheet for Y Balance Test  10-5-17    Left  Right  Difference  Comments    Anterior  48 60 12     Posteromedial  96 92 +4     Posterolateral  80 80 0                 Leg Length            **Difference should be less than 4 cm for return to sport and preparticipation screening (<10% deficit compared to uninvolved)**  (Star Excursion Balance Test as a Predictor of Lower Extremity Injury in [employer] Players)         RESTRICTIONS/PRECAUTIONS: : Fibromyalgia; R TKR 11/ 2016; sleep apnea (using CPAP)    Exercises/Interventions:   Exercise/Equipment Resistance/Repetitions Other comments 11/21/2017   Stretching      Hamstring 30\"x 5 Towel prop x   Hip Flexion      ITB      Grion      Quad      Inclined Calf 30''x 5  x   Towel Pull 30\"x 5 Towel prop *         SLR      Supine 3 x 10 2# hep   Prone      Abduction 3 x 10 2# hep   Adducton 2 x 10 x 10\" PS; extended hep   SLR+            Isometrics      Quad sets 2 x 10 x 10\" A/S x         Patellar Glides      Medial 3'  x   Superior 3'  x   Inferior 3'  x         ROM      Passive 5' ERMI x   Active x10 KE x   Weight Shift      Hang Weights 10' Propped x   Sheet Pulls      Ankle Pumps 5' Hourly x         CKC      Calf raises 3 x 10  x   Wall sits      Step ups 3 x 10 6\"; FSU x   Tandem stance 3 x 30'' ea  x   Squatting 20-30x Chair--sit<>stand x   CC TKE 3 x 10 Black x   Balance 4' RC L9 x         PRE      Extension 3 x 10 RANGE: 30-0, 10# x   Flexion 3 x 10 RANGE: 0-90, 20# x         Cable Column            Leg Press 3x10 RANGE: 80-10, 60# x         Bike 10' REC-revs; R2.0 x   Treadmill 6' Gait training 1.5 mph x   Gait training 5' Cup walk with 1 AD *     Other Therapeutic Activities:   WBAT-> normalize gait  RAGHAV hose  Cold Therapy    Home Exercise Program:    See above and attached. Initial HEP discussed and completed. Full written, verbal, and demonstration provided. Patient Education:      Full postop instructions provided. Written and verbal guidelines provided for but not limited to: DME/ HEP/ ICE/ gait/ general medical instructions.     Manual Treatments:   Patella mobs   9'  STM    5'      Therapeutic Exercise and NMR EXR  [x] (90527) Provided verbal/tactile cueing for activities related to strengthening, flexibility, endurance, ROM for improvements in LE, proximal hip, and core control with tens    [] ionto  [] autorange/biodex [] Interferential  [] other     Charges:  Timed Code Treatment Minutes: 61'   Total Treatment Minutes: 76'     [] EVAL: L2   [x] UR(35805) x  1   [] IONTO  [x] NMR (85269) x  1   [] VASO  [x] Manual (59785) x  1    [] Other:  [x] TA x  1    [] Mech Traction (35282)  [] ES(attended) (42659)      [x] ES (un) (82708):     GOALS:  Patient stated goal: Would like to resume full pain free walking.     Therapist goals for Patient:   Short Term Goals: To be achieved in: 2 weeks  1. Independent in HEP and progression per patient tolerance, in order to prevent re-injury. 2. Patient will have a decrease in pain to facilitate improvement in movement, function, and ADLs as indicated by Functional Deficits.     Long Term Goals: To be achieved in: 12 weeks  1. Disability index score of 50% or less for the LEFS to assist with reaching prior level of function. 2. Patient will demonstrate increased AROM to 0-130 deg to allow for proper joint functioning as indicated by patients Functional Deficits. 3. Patient will demonstrate an increase in Strength to good proximal hip strength and control in LE to allow for proper functional mobility as indicated by patients Functional Deficits/ Biodex parameters for bilateral comparison/ TQBW and HQ ratios based on age/ sex/ BW corrections. 4. Patient will return to 50%> functional activities without increased symptoms or restriction. 5. Will resume pain free ADL walking, and begin a recreational walking program.                Progression Towards Functional goals:  [] Patient is progressing as expected towards functional goals listed. [] Progression is slowed due to complexities listed. [] Progression has been slowed due to co-morbidities.   [x] Plan just implemented, too soon to assess goals progression  [] Other:     ASSESSMENT:   Functional Impairments:                          [x]Noted lumbar/proximal hip/LE hypomobility [x]Decreased LE functional ROM                        [x]Decreased core/proximal hip strength and neuromuscular control                        [x]Decreased LE functional strength   [x]Reduced balance/proprioceptive control                        []other:       Functional Activity Limitations (from functional questionnaire and intake)                        [x]Reduced ability to tolerate prolonged functional positions                        [x]Reduced ability or difficulty with changes of positions or transfers between positions                        [x]Reduced ability to maintain good posture and demonstrate good body mechanics with sitting, bending, and lifting                        [x]Reduced ability to sleep                        [x] Reduced ability or tolerance with driving and/or computer work                        [x]Reduced ability to perform lifting, carrying tasks                        [x]Reduced ability to squat                        [x]Reduced ability to forward bend                        [x]Reduced ability to ambulate prolonged functional periods/distances/surfaces                        [x]Reduced ability to ascend/descend stairs                        [x]Reduced ability to run, hop or jump                        []other:     Participation Restrictions                        [x]Reduced participation in self care activities                        [x]Reduced participation in home management activities                        [x]Reduced participation in work activities                        [x]Reduced participation in social activities. [x]Reduced participation in sport activities.     Classification           :                         [x]Signs/symptoms consistent with post-surgical status including decreased ROM, strength and function.                         []Signs/symptoms consistent with joint sprain/strain                        [x]Signs/symptoms consistent with patella-femoral syndrome                        [x]Signs/symptoms consistent with knee OA/hip OA                        []Signs/symptoms consistent with internal derangement of knee/Hip                        [x]Signs/symptoms consistent with functional hip weakness/NMR control                                         []Signs/symptoms consistent with tendinitis/tendinosis                                             []signs/symptoms consistent with pathology which may benefit from Dry needling                                      []other:       Prognosis/Rehab Potential:                                                                     []Excellent                        [x]Good                                     []Fair                        []Poor    Treatment/Activity Tolerance:  [x] Patient tolerated treatment well [x] Patient limited by fatique  [] Patient limited by pain  [] Patient limited by other medical complications  [x] Other: Tolerated addition of PRE machines well. Good patella mobility but does have some crepitus through soft tissue. Pt has full ROM with minimal tightness.      Prognosis: [x] Good [] Fair  [] Poor    Patient Requires Follow-up: [x] Yes  [] No    PLAN:   [] Continue per plan of care [x] Alter current plan (see comments)  [] Plan of care initiated [] Hold pending MD visit [] Discharge  Frequency/Duration:  1 days per week for 6 Weeks:  Progressive PO protocol for L TKR procedure  Gait training  MATT/ SLR   ROM/ patellar glides  CKC/ balance    Electronically signed by:    Gely Fletcher PTA

## 2017-11-28 ENCOUNTER — HOSPITAL ENCOUNTER (OUTPATIENT)
Dept: PHYSICAL THERAPY | Age: 67
Discharge: HOME | End: 2017-11-28
Admitting: ORTHOPAEDIC SURGERY

## 2017-11-28 NOTE — FLOWSHEET NOTE
49% (10/5/17; Preop)  LEFS Score: 42/80=53%  (11/2/17)     11-21-17            ROM PROM AROM Overpressure Comment     L R L R L R     Flexion 140° 140°          ERMI   Extension -2°  0° 0°          After prop  With QS                                          11-28-17  Strength L R Comment   Quad 4/5 5-/5 MATT 0°   Hamstring         Gastroc         Hip  flexion       Hip abd                              11-28-17  Special Test Results/Comment   Homans (-)   Temperature (-)      11-28-17  Girth L R   Mid Patella 42.0 40.8   Suprapatellar 44.5 42.9   5cm above 51.9 49.4   15cm above          Reflexes/Sensation:                         [x]Dermatomes/Myotomes intact                         [x]Reflexes equal and normal bilaterally                        []Other:     Joint mobility:                         [x]Normal                                   []Hypo                        []Hyper     Palpation: minimal tenderness     Functional Mobility/Transfers: Independent with transfer of LE from floor to table     Posture: Mild varus; prior L short (had used heel lift short term before changing shoe style)     Bandages/Dressings/Incisions: healed, mild thickening along scar     Gait:   FWB     TEST INITIAL  10-5-16 FOLLOW  UP GOAL   SINGLE LEG SQUAT TEST L: Poor (-)     R: Poor (-)   NO KNEE VALGUS, MEDIAL/LATERAL MOVEMENT, OR PELVIC TILT  GOOD, FAIR, POOR   6 MINUTE WALK TEST 0.15 miles                       M             F          60-70 y/o: >.31mile,  >.30mile  66-76 y/o: >.28 mile, >.26 mile  80-81 y/o: >.21mile,  >.20 mile   STAIR CLIMBING TEST 12.24 sec   < 13 SEC (M&F)      Score Sheet for Y Balance Test  10-5-17    Left  Right  Difference  Comments    Anterior  48 60 12     Posteromedial  96 92 +4     Posterolateral  80 80 0                 Leg Length            **Difference should be less than 4 cm for return to sport and preparticipation screening (<10% deficit compared to uninvolved)**  (Star Excursion Balance Test functional ROM                        [x]Decreased core/proximal hip strength and neuromuscular control                        [x]Decreased LE functional strength   [x]Reduced balance/proprioceptive control                        []other:       Functional Activity Limitations (from functional questionnaire and intake)                        [x]Reduced ability to tolerate prolonged functional positions                        [x]Reduced ability or difficulty with changes of positions or transfers between positions                        [x]Reduced ability to maintain good posture and demonstrate good body mechanics with sitting, bending, and lifting                        [x]Reduced ability to sleep                        [x] Reduced ability or tolerance with driving and/or computer work                        [x]Reduced ability to perform lifting, carrying tasks                        [x]Reduced ability to squat                        [x]Reduced ability to forward bend                        [x]Reduced ability to ambulate prolonged functional periods/distances/surfaces                        [x]Reduced ability to ascend/descend stairs                        [x]Reduced ability to run, hop or jump                        []other:     Participation Restrictions                        [x]Reduced participation in self care activities                        [x]Reduced participation in home management activities                        [x]Reduced participation in work activities                        [x]Reduced participation in social activities. [x]Reduced participation in sport activities.     Classification           :                         [x]Signs/symptoms consistent with post-surgical status including decreased ROM, strength and function.                         []Signs/symptoms consistent with joint sprain/strain                        [x]Signs/symptoms consistent with patella-femoral

## 2017-12-01 ENCOUNTER — HOSPITAL ENCOUNTER (OUTPATIENT)
Dept: PHYSICAL THERAPY | Age: 67
Discharge: HOME | End: 2017-12-31
Attending: ORTHOPAEDIC SURGERY | Admitting: ORTHOPAEDIC SURGERY

## 2017-12-05 ENCOUNTER — HOSPITAL ENCOUNTER (OUTPATIENT)
Dept: PHYSICAL THERAPY | Age: 67
Discharge: HOME | End: 2017-12-05
Admitting: ORTHOPAEDIC SURGERY

## 2017-12-05 NOTE — FLOWSHEET NOTE
control      OBJECTIVE:       LEFS Score: 39/ 80= 49% (10/5/17; Preop)  LEFS Score: 42/80=53%  (11/2/17)     11-21-17            ROM PROM AROM Overpressure Comment     L R L R L R     Flexion 140° 140°          ERMI   Extension -2°  0° 0°          After prop  With QS                                          11-28-17  Strength L R Comment   Quad 4/5 5-/5 MATT 0°   Hamstring         Gastroc         Hip  flexion       Hip abd                              11-28-17  Special Test Results/Comment   Homans (-)   Temperature (-)      11-28-17  Girth L R   Mid Patella 42.0 40.8   Suprapatellar 44.5 42.9   5cm above 51.9 49.4   15cm above          Reflexes/Sensation:                         [x]Dermatomes/Myotomes intact                         [x]Reflexes equal and normal bilaterally                        []Other:     Joint mobility:                         [x]Normal                                   []Hypo                        []Hyper     Palpation: minimal tenderness     Functional Mobility/Transfers: Independent with transfer of LE from floor to table     Posture: Mild varus; prior L short (had used heel lift short term before changing shoe style)     Bandages/Dressings/Incisions: healed, mild thickening along scar     Gait:   FWB     TEST INITIAL  10-5-16 FOLLOW  UP GOAL   SINGLE LEG SQUAT TEST L: Poor (-)     R: Poor (-)   NO KNEE VALGUS, MEDIAL/LATERAL MOVEMENT, OR PELVIC TILT  GOOD, FAIR, POOR   6 MINUTE WALK TEST 0.15 miles                       M             F          60-68 y/o: >.31mile,  >.30mile  66-78 y/o: >.28 mile, >.26 mile  80-79 y/o: >.21mile,  >.20 mile   STAIR CLIMBING TEST 12.24 sec   < 13 SEC (M&F)      Score Sheet for Y Balance Test  10-5-17    Left  Right  Difference  Comments    Anterior  48 60 12     Posteromedial  96 92 +4     Posterolateral  80 80 0                 Leg Length            **Difference should be less than 4 cm for return to sport and preparticipation screening (<10% deficit hypomobility                        [x]Decreased LE functional ROM                        [x]Decreased core/proximal hip strength and neuromuscular control                        [x]Decreased LE functional strength   [x]Reduced balance/proprioceptive control                        []other:       Functional Activity Limitations (from functional questionnaire and intake)                        [x]Reduced ability to tolerate prolonged functional positions                        [x]Reduced ability or difficulty with changes of positions or transfers between positions                        [x]Reduced ability to maintain good posture and demonstrate good body mechanics with sitting, bending, and lifting                        [x]Reduced ability to sleep                        [x] Reduced ability or tolerance with driving and/or computer work                        [x]Reduced ability to perform lifting, carrying tasks                        [x]Reduced ability to squat                        [x]Reduced ability to forward bend                        [x]Reduced ability to ambulate prolonged functional periods/distances/surfaces                        [x]Reduced ability to ascend/descend stairs                        [x]Reduced ability to run, hop or jump                        []other:     Participation Restrictions                        [x]Reduced participation in self care activities                        [x]Reduced participation in home management activities                        [x]Reduced participation in work activities                        [x]Reduced participation in social activities. [x]Reduced participation in sport activities.     Classification           :                         [x]Signs/symptoms consistent with post-surgical status including decreased ROM, strength and function.                         []Signs/symptoms consistent with joint sprain/strain [x]Signs/symptoms consistent with patella-femoral syndrome                        [x]Signs/symptoms consistent with knee OA/hip OA                        []Signs/symptoms consistent with internal derangement of knee/Hip                        [x]Signs/symptoms consistent with functional hip weakness/NMR control                                         []Signs/symptoms consistent with tendinitis/tendinosis                                             []signs/symptoms consistent with pathology which may benefit from Dry needling                                      []other:       Prognosis/Rehab Potential:                                                                     []Excellent                        [x]Good                                     []Fair                        []Poor    Treatment/Activity Tolerance:  [x] Patient tolerated treatment well [x] Patient limited by fatique  [] Patient limited by pain  [] Patient limited by other medical complications  [x] Other: Progressing well at this point. Good patella mobility but does have some crepitus through soft tissue. Pt has full ROM with minimal tightness. Good tolerance to Tx.     Prognosis: [x] Good [] Fair  [] Poor    Patient Requires Follow-up: [x] Yes  [] No    PLAN:   [] Continue per plan of care [x] Alter current plan (see comments)  [] Plan of care initiated [] Hold pending MD visit [] Discharge  Frequency/Duration:  1 days per week for 6 Weeks:  Progressive PO protocol for L TKR procedure  Gait training  MATT/ SLR   ROM/ patellar glides  CKC/ balance  Consider SLB    Electronically signed by:    Ingrid Delgado PTA

## 2017-12-12 ENCOUNTER — HOSPITAL ENCOUNTER (OUTPATIENT)
Dept: PHYSICAL THERAPY | Age: 67
Discharge: HOME | End: 2017-12-12
Admitting: ORTHOPAEDIC SURGERY

## 2017-12-12 NOTE — FLOWSHEET NOTE
>.31mile,  >.30mile  66-78 y/o: >.28 mile, >.26 mile  80-79 y/o: >.21mile,  >.20 mile   STAIR CLIMBING TEST 12.24 sec   < 13 SEC (M&F)      Score Sheet for Y Balance Test  10-5-17    Left  Right  Difference  Comments    Anterior  48 60 12     Posteromedial  96 92 +4     Posterolateral  80 80 0                 Leg Length            **Difference should be less than 4 cm for return to sport and preparticipation screening (<10% deficit compared to uninvolved)**  (Star Excursion Balance Test as a Predictor of Lower Extremity Injury in High School Basketball Players)         RESTRICTIONS/PRECAUTIONS: : Fibromyalgia; R TKR 11/ 2016; sleep apnea (using CPAP)    Exercises/Interventions:   Exercise/Equipment Resistance/Repetitions Other comments 12/12/2017   Stretching      Hamstring 30\"x 5 Towel prop x   Hip Flexion      ITB      Grion      Quad      Inclined Calf 30''x 5  x   Towel Pull 30\"x 5 Towel prop *         SLR      Supine 3 x 10 2# hep   Prone      Abduction 3 x 10 2# hep   Adducton 2 x 10 x 10\" PS; extended hep   SLR+            Isometrics      Quad sets 2 x 10 x 10\" A/S x         Patellar Glides      Medial 3'  x   Superior 3'  x   Inferior 3'  x         ROM      Passive 5' ERMI x   Active x10 KE x   Weight Shift      Hang Weights 10' Propped x   Sheet Pulls      Ankle Pumps 5' Hourly x         CKC      Calf raises 3 x 10  x   Wall sits      Step ups 3 x 10 8\"; FSU x   Tandem stance 3 x 30'' ea aeromat x   Squatting 20-30x Chair--sit<>stand x   CC TKE 3 x 10 silver x   Balance 4' RC L9 x         PRE      Extension 3 x 10 RANGE: 30-0, 10# x   Flexion 3 x 10 RANGE: 0-90, 20# x         Cable Column            Leg Press 3x10 RANGE: 80-10, 60# x         Bike 10' REC; R2.0 x   Treadmill 6' Gait training 1.5 mph *   Gait training 5' Cup walk with 1 AD *     Other Therapeutic Activities:   WBAT-> normalize gait  RAGHAV hose  Cold Therapy    Home Exercise Program:    See above and attached.  Initial HEP discussed and completed. Full written, verbal, and demonstration provided. Patient Education:      Full postop instructions provided. Written and verbal guidelines provided for but not limited to: DME/ HEP/ ICE/ gait/ general medical instructions. Manual Treatments:         Therapeutic Exercise and NMR EXR  [x] (96531) Provided verbal/tactile cueing for activities related to strengthening, flexibility, endurance, ROM for improvements in LE, proximal hip, and core control with self care, mobility, lifting, ambulation. [x] (22221) Provided verbal/tactile cueing for activities related to improving balance, coordination, kinesthetic sense, posture, motor skill, proprioception  to assist with LE, proximal hip, and core control in self care, mobility, lifting, ambulation and eccentric single leg control.      NMR and Therapeutic Activities:    [x] (23427 or 24771) Provided verbal/tactile cueing for activities related to improving balance, coordination, kinesthetic sense, posture, motor skill, proprioception and motor activation to allow for proper function of core, proximal hip and LE with self care and ADLs  [] (11315) Gait Re-education- Provided training and instruction to the patient for proper LE, core and proximal hip recruitment and positioning and eccentric body weight control with ambulation re-education including up and down stairs     Home Exercise Program:    [x] (20395) Reviewed/Progressed HEP activities related to strengthening, flexibility, endurance, ROM of core, proximal hip and LE for functional self-care, mobility, lifting and ambulation/stair navigation   [x] (75330)Reviewed/Progressed HEP activities related to improving balance, coordination, kinesthetic sense, posture, motor skill, proprioception of core, proximal hip and LE for self care, mobility, lifting, and ambulation/stair navigation      Manual Treatments:  PROM / STM / Oscillations-Mobs:  G-I, II, III, IV (PA's, Inf., Post.)  [] (92783) Provided [x]Reduced participation in work activities                        [x]Reduced participation in social activities. [x]Reduced participation in sport activities.     Classification           :                         [x]Signs/symptoms consistent with post-surgical status including decreased ROM, strength and function. []Signs/symptoms consistent with joint sprain/strain                        [x]Signs/symptoms consistent with patella-femoral syndrome                        [x]Signs/symptoms consistent with knee OA/hip OA                        []Signs/symptoms consistent with internal derangement of knee/Hip                        [x]Signs/symptoms consistent with functional hip weakness/NMR control                                         []Signs/symptoms consistent with tendinitis/tendinosis                                             []signs/symptoms consistent with pathology which may benefit from Dry needling                                      []other:       Prognosis/Rehab Potential:                                                                     []Excellent                        [x]Good                                     []Fair                        []Poor    Treatment/Activity Tolerance:  [x] Patient tolerated treatment well [x] Patient limited by fatique  [] Patient limited by pain  [] Patient limited by other medical complications  [x] Other: Tolerated all well.      Prognosis: [x] Good [] Fair  [] Poor    Patient Requires Follow-up: [x] Yes  [] No    PLAN:   [] Continue per plan of care [x] Alter current plan (see comments)  [] Plan of care initiated [] Hold pending MD visit [] Discharge  Frequency/Duration:  1 days per week for 6 Weeks:  Progressive PO protocol for L TKR procedure  Gait training  MATT/ SLR   ROM/ patellar glides  CKC/ balance  Consider SLB  See MD in 1-2 weeks for final check  Transition to I HEP at 12 weeks post op  ADL TKR functional testing      Electronically signed by:    Phylicia Palmer PTA

## 2017-12-19 ENCOUNTER — HOSPITAL ENCOUNTER (OUTPATIENT)
Dept: PHYSICAL THERAPY | Age: 67
Discharge: HOME | End: 2017-12-19
Admitting: ORTHOPAEDIC SURGERY

## 2017-12-19 NOTE — FLOWSHEET NOTE
[facility]  Orthopaedics and Sports Rehabilitation, [facility]      Physical Therapy Daily Treatment Note  Date:  2017    Patient Name:  Bobby Saeed    :  1950  MRN: [id number]  Restrictions/Precautions:    Medical/Treatment Diagnosis Information:  Diagnosis: M17.12 (ICD-10-CM) - Primary osteoarthritis of left knee  Treatment Diagnosis: Knee pain/ swelling/ difficulty walking/ limited ROM/ LE muscle weakness  S/P L knee TKR  DOS: 10/9/17  Current PO meds:; Ibuprofen PRN; ASA 81 mg 1 tab PRN ;    Patient seen in consultation with Dr. Meza [facility] who established the initial/subsequent treatment protocol. 10-12-17: doing well, confirmed 81 mg ASA BID  17: doing well; pleased with progress; continue per protocol--seen upstairs   17: doing well, pleased with outcome, f/u prn    Insurance/Certification information:  [facility] [employer]  Physician Information:   Vernell Esquivel MD  Plan of care signed (Y/N): Y    Date of Patient follow up with Physician: FU appointments for PO MD/ PT at 12 weeks    G-Code (if applicable):      Date G-Code Applied:  17       Progress Note: [x]  Yes  []  No  Next due by: Visit #20       Latex Allergy:  [x]NO      []YES  Preferred Language for Healthcare:   [x]English       []other:    Visit # Insurance Allowable   17 Medical Necessity     Pain Scale: 0/10 @ rest            1/10 @ worst  Easing factors: Rest/ ice/ meds  Provocative factors:WB activity: walking> standing; stairs; kneeling; squatting (return> down)   Type: []Constant                     [x]Intermittent                []Radiating                   []Localized                   []other:                            SUBJECTIVE: Knee feels good, but is noticing some occ quick pain in proximal anterior tibialis.     (-) giving way  (-) night pain  (+) popping/ bilateral \"prosthesis click\"  (-)catching/locking  (+) swelling  (+/-) stiffness  (+) stairs/ down> up  (+) kneeling/squatting// EXR  [x] (99521) Provided verbal/tactile cueing for activities related to strengthening, flexibility, endurance, ROM for improvements in LE, proximal hip, and core control with self care, mobility, lifting, ambulation. [x] (09915) Provided verbal/tactile cueing for activities related to improving balance, coordination, kinesthetic sense, posture, motor skill, proprioception  to assist with LE, proximal hip, and core control in self care, mobility, lifting, ambulation and eccentric single leg control.      NMR and Therapeutic Activities:    [x] (39946 or 11379) Provided verbal/tactile cueing for activities related to improving balance, coordination, kinesthetic sense, posture, motor skill, proprioception and motor activation to allow for proper function of core, proximal hip and LE with self care and ADLs  [] (77993) Gait Re-education- Provided training and instruction to the patient for proper LE, core and proximal hip recruitment and positioning and eccentric body weight control with ambulation re-education including up and down stairs     Home Exercise Program:    [x] (12898) Reviewed/Progressed HEP activities related to strengthening, flexibility, endurance, ROM of core, proximal hip and LE for functional self-care, mobility, lifting and ambulation/stair navigation   [x] (19886)Reviewed/Progressed HEP activities related to improving balance, coordination, kinesthetic sense, posture, motor skill, proprioception of core, proximal hip and LE for self care, mobility, lifting, and ambulation/stair navigation      Manual Treatments:  PROM / STM / Oscillations-Mobs:  G-I, II, III, IV (PA's, Inf., Post.)  [] (22241) Provided manual therapy to mobilize LE, proximal hip and/or LS spine soft tissue/joints for the purpose of modulating pain, promoting relaxation,  increasing ROM, reducing/eliminating soft tissue swelling/inflammation/restriction, improving soft tissue extensibility and allowing for proper ROM for normal co-morbidities. [] Plan just implemented, too soon to assess goals progression  [] Other:     ASSESSMENT:   Functional Impairments:                          [x]Noted lumbar/proximal hip/LE hypomobility                        [x]Decreased LE functional ROM                        [x]Decreased core/proximal hip strength and neuromuscular control                        [x]Decreased LE functional strength   [x]Reduced balance/proprioceptive control                        []other:       Functional Activity Limitations (from functional questionnaire and intake)                        [x]Reduced ability to tolerate prolonged functional positions                        [x]Reduced ability or difficulty with changes of positions or transfers between positions                        []Reduced ability to maintain good posture and demonstrate good body mechanics with sitting, bending, and lifting                        []Reduced ability to sleep                        [] Reduced ability or tolerance with driving and/or computer work                        []Reduced ability to perform lifting, carrying tasks                        [x]Reduced ability to squat                        [x]Reduced ability to forward bend                        [x]Reduced ability to ambulate prolonged functional periods/distances/surfaces                        [x]Reduced ability to ascend/descend stairs                        [x]Reduced ability to run, hop or jump                        []other:     Participation Restrictions                        []Reduced participation in self care activities                        []Reduced participation in home management activities                        [x]Reduced participation in work activities                        [x]Reduced participation in social activities.                         [x]Reduced participation in sport activities.     Classification           :                         [x]Signs/symptoms

## 2018-01-01 ENCOUNTER — HOSPITAL ENCOUNTER (OUTPATIENT)
Dept: PHYSICAL THERAPY | Age: 68
Discharge: HOME | End: 2018-01-31
Attending: ORTHOPAEDIC SURGERY | Admitting: ORTHOPAEDIC SURGERY

## 2018-03-08 ENCOUNTER — OFFICE VISIT (OUTPATIENT)
Dept: FAMILY MEDICINE CLINIC | Age: 68
End: 2018-03-08

## 2018-03-08 VITALS
DIASTOLIC BLOOD PRESSURE: 88 MMHG | WEIGHT: 217 LBS | OXYGEN SATURATION: 98 % | SYSTOLIC BLOOD PRESSURE: 124 MMHG | HEIGHT: 63 IN | HEART RATE: 73 BPM | BODY MASS INDEX: 38.45 KG/M2

## 2018-03-08 DIAGNOSIS — M79.7 FIBROMYALGIA: ICD-10-CM

## 2018-03-08 DIAGNOSIS — R73.02 IGT (IMPAIRED GLUCOSE TOLERANCE): Primary | ICD-10-CM

## 2018-03-08 DIAGNOSIS — R73.02 IGT (IMPAIRED GLUCOSE TOLERANCE): ICD-10-CM

## 2018-03-08 LAB
ANION GAP SERPL CALCULATED.3IONS-SCNC: 14 MMOL/L (ref 3–16)
BUN BLDV-MCNC: 15 MG/DL (ref 7–20)
CALCIUM SERPL-MCNC: 9.4 MG/DL (ref 8.3–10.6)
CHLORIDE BLD-SCNC: 103 MMOL/L (ref 99–110)
CO2: 26 MMOL/L (ref 21–32)
CREAT SERPL-MCNC: 0.6 MG/DL (ref 0.6–1.2)
GFR AFRICAN AMERICAN: >60
GFR NON-AFRICAN AMERICAN: >60
GLUCOSE BLD-MCNC: 101 MG/DL (ref 70–99)
POTASSIUM SERPL-SCNC: 4.9 MMOL/L (ref 3.5–5.1)
SODIUM BLD-SCNC: 143 MMOL/L (ref 136–145)

## 2018-03-08 PROCEDURE — G8427 DOCREV CUR MEDS BY ELIG CLIN: HCPCS | Performed by: FAMILY MEDICINE

## 2018-03-08 PROCEDURE — 3017F COLORECTAL CA SCREEN DOC REV: CPT | Performed by: FAMILY MEDICINE

## 2018-03-08 PROCEDURE — G8399 PT W/DXA RESULTS DOCUMENT: HCPCS | Performed by: FAMILY MEDICINE

## 2018-03-08 PROCEDURE — 3014F SCREEN MAMMO DOC REV: CPT | Performed by: FAMILY MEDICINE

## 2018-03-08 PROCEDURE — 1090F PRES/ABSN URINE INCON ASSESS: CPT | Performed by: FAMILY MEDICINE

## 2018-03-08 PROCEDURE — 1036F TOBACCO NON-USER: CPT | Performed by: FAMILY MEDICINE

## 2018-03-08 PROCEDURE — 1123F ACP DISCUSS/DSCN MKR DOCD: CPT | Performed by: FAMILY MEDICINE

## 2018-03-08 PROCEDURE — 99213 OFFICE O/P EST LOW 20 MIN: CPT | Performed by: FAMILY MEDICINE

## 2018-03-08 PROCEDURE — 4040F PNEUMOC VAC/ADMIN/RCVD: CPT | Performed by: FAMILY MEDICINE

## 2018-03-08 PROCEDURE — G8417 CALC BMI ABV UP PARAM F/U: HCPCS | Performed by: FAMILY MEDICINE

## 2018-03-08 PROCEDURE — G8482 FLU IMMUNIZE ORDER/ADMIN: HCPCS | Performed by: FAMILY MEDICINE

## 2018-03-08 RX ORDER — OMEPRAZOLE 40 MG/1
CAPSULE, DELAYED RELEASE ORAL
Qty: 90 CAPSULE | Refills: 3 | Status: SHIPPED | OUTPATIENT
Start: 2018-03-08 | End: 2019-01-29 | Stop reason: SDUPTHER

## 2018-03-08 RX ORDER — DULOXETIN HYDROCHLORIDE 20 MG/1
20 CAPSULE, DELAYED RELEASE ORAL 2 TIMES DAILY
Qty: 180 CAPSULE | Refills: 1 | Status: SHIPPED | OUTPATIENT
Start: 2018-03-08 | End: 2018-08-22 | Stop reason: SDUPTHER

## 2018-03-08 RX ORDER — FLUTICASONE PROPIONATE 50 MCG
2 SPRAY, SUSPENSION (ML) NASAL DAILY
Qty: 3 BOTTLE | Refills: 3 | Status: SHIPPED | OUTPATIENT
Start: 2018-03-08 | End: 2019-01-29 | Stop reason: SDUPTHER

## 2018-03-08 RX ORDER — PREGABALIN 75 MG/1
75 CAPSULE ORAL 3 TIMES DAILY
Qty: 270 CAPSULE | Refills: 1 | Status: SHIPPED | OUTPATIENT
Start: 2018-03-08 | End: 2018-08-22 | Stop reason: SDUPTHER

## 2018-03-08 ASSESSMENT — ENCOUNTER SYMPTOMS
ABDOMINAL PAIN: 0
DIARRHEA: 0
COUGH: 0
BACK PAIN: 1
CONSTIPATION: 0
SHORTNESS OF BREATH: 0

## 2018-03-09 LAB
ESTIMATED AVERAGE GLUCOSE: 108.3 MG/DL
HBA1C MFR BLD: 5.4 %

## 2018-03-29 ENCOUNTER — HOSPITAL ENCOUNTER (OUTPATIENT)
Dept: PHYSICAL THERAPY | Age: 68
Discharge: HOME | End: 2018-03-31
Admitting: ORTHOPAEDIC SURGERY

## 2018-03-29 ENCOUNTER — OFFICE VISIT (OUTPATIENT)
Dept: ORTHOPEDIC SURGERY | Age: 68
End: 2018-03-29

## 2018-03-29 VITALS
SYSTOLIC BLOOD PRESSURE: 140 MMHG | HEIGHT: 63 IN | HEART RATE: 69 BPM | DIASTOLIC BLOOD PRESSURE: 89 MMHG | BODY MASS INDEX: 38.44 KG/M2 | WEIGHT: 216.93 LBS

## 2018-03-29 DIAGNOSIS — Z96.652 HISTORY OF TOTAL KNEE REPLACEMENT, LEFT: Primary | ICD-10-CM

## 2018-03-29 PROCEDURE — 99213 OFFICE O/P EST LOW 20 MIN: CPT | Performed by: ORTHOPAEDIC SURGERY

## 2018-04-01 ENCOUNTER — HOSPITAL ENCOUNTER (OUTPATIENT)
Dept: PHYSICAL THERAPY | Age: 68
Discharge: HOME | End: 2018-04-30
Attending: ORTHOPAEDIC SURGERY | Admitting: ORTHOPAEDIC SURGERY

## 2018-04-20 ENCOUNTER — TELEPHONE (OUTPATIENT)
Dept: FAMILY MEDICINE CLINIC | Age: 68
End: 2018-04-20

## 2018-04-20 DIAGNOSIS — Z12.39 BREAST CANCER SCREENING: Primary | ICD-10-CM

## 2018-04-23 ENCOUNTER — HOSPITAL ENCOUNTER (OUTPATIENT)
Dept: MAMMOGRAPHY | Age: 68
Discharge: HOME | End: 2018-04-23
Admitting: FAMILY MEDICINE

## 2018-04-23 DIAGNOSIS — Z12.31 ENCOUNTER FOR SCREENING MAMMOGRAM FOR MALIGNANT NEOPLASM OF BREAST: ICD-10-CM

## 2018-04-25 ENCOUNTER — OFFICE VISIT (OUTPATIENT)
Dept: PULMONOLOGY | Age: 68
End: 2018-04-25

## 2018-04-25 VITALS
SYSTOLIC BLOOD PRESSURE: 138 MMHG | HEART RATE: 74 BPM | WEIGHT: 220.4 LBS | HEIGHT: 63 IN | OXYGEN SATURATION: 98 % | BODY MASS INDEX: 39.05 KG/M2 | DIASTOLIC BLOOD PRESSURE: 76 MMHG

## 2018-04-25 DIAGNOSIS — G47.33 OBSTRUCTIVE SLEEP APNEA SYNDROME: Primary | Chronic | ICD-10-CM

## 2018-04-25 DIAGNOSIS — M79.7 FIBROMYALGIA: Chronic | ICD-10-CM

## 2018-04-25 DIAGNOSIS — F32.89 OTHER DEPRESSION: Chronic | ICD-10-CM

## 2018-04-25 DIAGNOSIS — E66.9 NON MORBID OBESITY, UNSPECIFIED OBESITY TYPE: Chronic | ICD-10-CM

## 2018-04-25 PROCEDURE — 3017F COLORECTAL CA SCREEN DOC REV: CPT | Performed by: NURSE PRACTITIONER

## 2018-04-25 PROCEDURE — 1123F ACP DISCUSS/DSCN MKR DOCD: CPT | Performed by: NURSE PRACTITIONER

## 2018-04-25 PROCEDURE — G8427 DOCREV CUR MEDS BY ELIG CLIN: HCPCS | Performed by: NURSE PRACTITIONER

## 2018-04-25 PROCEDURE — 4040F PNEUMOC VAC/ADMIN/RCVD: CPT | Performed by: NURSE PRACTITIONER

## 2018-04-25 PROCEDURE — 99214 OFFICE O/P EST MOD 30 MIN: CPT | Performed by: NURSE PRACTITIONER

## 2018-04-25 PROCEDURE — G8399 PT W/DXA RESULTS DOCUMENT: HCPCS | Performed by: NURSE PRACTITIONER

## 2018-04-25 PROCEDURE — G8417 CALC BMI ABV UP PARAM F/U: HCPCS | Performed by: NURSE PRACTITIONER

## 2018-04-25 PROCEDURE — 1090F PRES/ABSN URINE INCON ASSESS: CPT | Performed by: NURSE PRACTITIONER

## 2018-04-25 PROCEDURE — 1036F TOBACCO NON-USER: CPT | Performed by: NURSE PRACTITIONER

## 2018-04-25 ASSESSMENT — SLEEP AND FATIGUE QUESTIONNAIRES
HOW LIKELY ARE YOU TO NOD OFF OR FALL ASLEEP WHILE SITTING AND READING: 0
HOW LIKELY ARE YOU TO NOD OFF OR FALL ASLEEP WHILE SITTING AND TALKING TO SOMEONE: 0
ESS TOTAL SCORE: 0
HOW LIKELY ARE YOU TO NOD OFF OR FALL ASLEEP WHEN YOU ARE A PASSENGER IN A CAR FOR AN HOUR WITHOUT A BREAK: 0
HOW LIKELY ARE YOU TO NOD OFF OR FALL ASLEEP WHILE SITTING INACTIVE IN A PUBLIC PLACE: 0
HOW LIKELY ARE YOU TO NOD OFF OR FALL ASLEEP IN A CAR, WHILE STOPPED FOR A FEW MINUTES IN TRAFFIC: 0
HOW LIKELY ARE YOU TO NOD OFF OR FALL ASLEEP WHILE WATCHING TV: 0
HOW LIKELY ARE YOU TO NOD OFF OR FALL ASLEEP WHILE LYING DOWN TO REST IN THE AFTERNOON WHEN CIRCUMSTANCES PERMIT: 0
HOW LIKELY ARE YOU TO NOD OFF OR FALL ASLEEP WHILE SITTING QUIETLY AFTER LUNCH WITHOUT ALCOHOL: 0

## 2018-04-25 ASSESSMENT — ENCOUNTER SYMPTOMS
RHINORRHEA: 0
ABDOMINAL DISTENTION: 0
SHORTNESS OF BREATH: 0
APNEA: 0
COUGH: 0
SINUS PRESSURE: 0
ABDOMINAL PAIN: 0

## 2018-08-10 ENCOUNTER — TELEPHONE (OUTPATIENT)
Dept: FAMILY MEDICINE CLINIC | Age: 68
End: 2018-08-10

## 2018-08-10 NOTE — TELEPHONE ENCOUNTER
Patient needs an insurance referral to see: KRISTY Davila (Dr Venita Wilburn)    Insurance: Shelby Akhtar    Appointment is on 9/5/18    What are they being seen for: growth on face - all over

## 2018-08-13 NOTE — TELEPHONE ENCOUNTER
Tell the patient that that is probably appropriate. She might try something like Aspercreme or icy hot type topical, if it is not too uncomfortable. If things worsen or there are any pulmonary symptoms, she needs to be seen.

## 2018-08-15 DIAGNOSIS — L98.9 LESION OF SKIN OF FACE: Primary | ICD-10-CM

## 2018-08-22 ENCOUNTER — OFFICE VISIT (OUTPATIENT)
Dept: FAMILY MEDICINE CLINIC | Age: 68
End: 2018-08-22

## 2018-08-22 VITALS
HEIGHT: 62 IN | WEIGHT: 219 LBS | HEART RATE: 57 BPM | SYSTOLIC BLOOD PRESSURE: 132 MMHG | OXYGEN SATURATION: 97 % | BODY MASS INDEX: 40.3 KG/M2 | DIASTOLIC BLOOD PRESSURE: 76 MMHG

## 2018-08-22 DIAGNOSIS — M79.7 FIBROMYALGIA: ICD-10-CM

## 2018-08-22 DIAGNOSIS — Z00.00 ANNUAL PHYSICAL EXAM: Primary | ICD-10-CM

## 2018-08-22 DIAGNOSIS — Z00.00 ROUTINE GENERAL MEDICAL EXAMINATION AT A HEALTH CARE FACILITY: ICD-10-CM

## 2018-08-22 PROCEDURE — 4040F PNEUMOC VAC/ADMIN/RCVD: CPT | Performed by: FAMILY MEDICINE

## 2018-08-22 PROCEDURE — G0438 PPPS, INITIAL VISIT: HCPCS | Performed by: FAMILY MEDICINE

## 2018-08-22 RX ORDER — DULOXETIN HYDROCHLORIDE 20 MG/1
20 CAPSULE, DELAYED RELEASE ORAL 2 TIMES DAILY
Qty: 180 CAPSULE | Refills: 1 | Status: SHIPPED | OUTPATIENT
Start: 2018-08-22 | End: 2019-01-29 | Stop reason: SDUPTHER

## 2018-08-22 RX ORDER — PREGABALIN 75 MG/1
75 CAPSULE ORAL 3 TIMES DAILY
Qty: 270 CAPSULE | Refills: 1 | Status: SHIPPED | OUTPATIENT
Start: 2018-08-22 | End: 2019-01-29 | Stop reason: SDUPTHER

## 2018-08-22 ASSESSMENT — LIFESTYLE VARIABLES
AUDIT TOTAL SCORE: 7
HOW MANY STANDARD DRINKS CONTAINING ALCOHOL DO YOU HAVE ON A TYPICAL DAY: 1
HAVE YOU OR SOMEONE ELSE BEEN INJURED AS A RESULT OF YOUR DRINKING: 0
HOW OFTEN DURING THE LAST YEAR HAVE YOU HAD A FEELING OF GUILT OR REMORSE AFTER DRINKING: 0
HOW OFTEN DURING THE LAST YEAR HAVE YOU BEEN UNABLE TO REMEMBER WHAT HAPPENED THE NIGHT BEFORE BECAUSE YOU HAD BEEN DRINKING: 0
HOW OFTEN DO YOU HAVE SIX OR MORE DRINKS ON ONE OCCASION: 2
HOW OFTEN DURING THE LAST YEAR HAVE YOU FOUND THAT YOU WERE NOT ABLE TO STOP DRINKING ONCE YOU HAD STARTED: 0
HOW OFTEN DURING THE LAST YEAR HAVE YOU NEEDED AN ALCOHOLIC DRINK FIRST THING IN THE MORNING TO GET YOURSELF GOING AFTER A NIGHT OF HEAVY DRINKING: 0
HAS A RELATIVE, FRIEND, DOCTOR, OR ANOTHER HEALTH PROFESSIONAL EXPRESSED CONCERN ABOUT YOUR DRINKING OR SUGGESTED YOU CUT DOWN: 0
HOW OFTEN DO YOU HAVE A DRINK CONTAINING ALCOHOL: 4
HOW OFTEN DURING THE LAST YEAR HAVE YOU FAILED TO DO WHAT WAS NORMALLY EXPECTED FROM YOU BECAUSE OF DRINKING: 0
AUDIT-C TOTAL SCORE: 7

## 2018-08-22 ASSESSMENT — PATIENT HEALTH QUESTIONNAIRE - PHQ9
SUM OF ALL RESPONSES TO PHQ QUESTIONS 1-9: 0
SUM OF ALL RESPONSES TO PHQ QUESTIONS 1-9: 0

## 2018-08-22 ASSESSMENT — ANXIETY QUESTIONNAIRES: GAD7 TOTAL SCORE: 6

## 2018-08-22 NOTE — PROGRESS NOTES
Medicine)  TOM Arambula CNP as Nurse Practitioner (Nurse Practitioner)    Wt Readings from Last 3 Encounters:   08/22/18 219 lb (99.3 kg)   04/25/18 220 lb 6.4 oz (100 kg)   03/29/18 216 lb 14.9 oz (98.4 kg)     Vitals:    08/22/18 1056   BP: 132/76   Site: Right Arm   Position: Sitting   Cuff Size: Large Adult   Pulse: 57   SpO2: 97%   Weight: 219 lb (99.3 kg)   Height: 5' 2\" (1.575 m)     Body mass index is 40.06 kg/m². Patient's complete Health Risk Assessment and screening values have been reviewed and are found in Flowsheets. The following problems were reviewed today and where indicated follow up appointments were made and/or referrals ordered. Positive Risk Factor Screenings with Interventions:     Health Habits/Nutrition:  Health Habits/Nutrition  Do you exercise for at least 20 minutes 2-3 times per week?: Yes  Have you lost any weight without trying in the past 3 months?: No  Do you eat fewer than 2 meals per day?: No  Have you seen a dentist within the past year?: Yes  Body mass index is 40.06 kg/m². Health Habits/Nutrition Interventions:  · Patient has debility secondary to fibromyalgia, but tries to walk regularly and stay active. She is trying to deal with her depression also with medication but also staying active and positive. Discussed strategies and options for therapy going forward.     Hearing/Vision:  Hearing/Vision  Do you or your family notice any trouble with your hearing?: No  Do you have difficulty driving, watching TV, or doing any of your daily activities because of your eyesight?: No  Have you had an eye exam within the past year?: (!) No  Hearing/Vision Interventions:  · None indicated    Personalized Preventive Plan   Current Health Maintenance Status  Immunization History   Administered Date(s) Administered    Influenza Virus Vaccine 11/17/2014    Influenza, High Dose 10/10/2017    Pneumococcal 13-valent Conjugate (Thedore Erps) 10/23/2017        Health Maintenance   Topic Date Due    Hepatitis C screen  1950    DTaP/Tdap/Td vaccine (1 - Tdap) 08/08/1971    Shingles Vaccine (1 of 2 - 2 Dose Series) 08/08/2002    Flu vaccine (1) 09/01/2018    Pneumococcal low/med risk (2 of 2 - PPSV23) 10/23/2018    Colon Cancer Screen FIT/FOBT  10/25/2018    Lipid screen  04/03/2020    Breast cancer screen  04/23/2020    Diabetes screen  03/08/2021    DEXA (modify frequency per FRAX score)  Completed     Recommendations for Preventive Services Due: see orders and patient instructions/AVS.  .   Recommended screening schedule for the next 5-10 years is provided to the patient in written form: see Patient Instructions/AVS.

## 2018-09-18 ENCOUNTER — HOSPITAL ENCOUNTER (OUTPATIENT)
Dept: PHYSICAL THERAPY | Age: 68
Setting detail: THERAPIES SERIES
Discharge: HOME | End: 2018-09-18
Payer: MEDICARE

## 2018-09-18 ENCOUNTER — OFFICE VISIT (OUTPATIENT)
Dept: ORTHOPEDIC SURGERY | Age: 68
End: 2018-09-18

## 2018-09-18 VITALS
WEIGHT: 219 LBS | HEIGHT: 62 IN | SYSTOLIC BLOOD PRESSURE: 146 MMHG | BODY MASS INDEX: 40.3 KG/M2 | HEART RATE: 62 BPM | DIASTOLIC BLOOD PRESSURE: 81 MMHG

## 2018-09-18 DIAGNOSIS — Z96.652 HISTORY OF TOTAL KNEE REPLACEMENT, LEFT: ICD-10-CM

## 2018-09-18 DIAGNOSIS — Z96.651 HISTORY OF TOTAL KNEE REPLACEMENT, RIGHT: Primary | ICD-10-CM

## 2018-09-18 PROCEDURE — 97110 THERAPEUTIC EXERCISES: CPT | Performed by: PHYSICAL THERAPIST

## 2018-09-18 PROCEDURE — 99999 PR OFFICE/OUTPT VISIT,PROCEDURE ONLY: CPT | Performed by: ORTHOPAEDIC SURGERY

## 2018-09-18 PROCEDURE — 97530 THERAPEUTIC ACTIVITIES: CPT | Performed by: PHYSICAL THERAPIST

## 2018-09-18 PROCEDURE — 97112 NEUROMUSCULAR REEDUCATION: CPT | Performed by: PHYSICAL THERAPIST

## 2018-09-18 PROCEDURE — 97750 PHYSICAL PERFORMANCE TEST: CPT | Performed by: PHYSICAL THERAPIST

## 2018-09-18 PROCEDURE — G8978 MOBILITY CURRENT STATUS: HCPCS | Performed by: PHYSICAL THERAPIST

## 2018-09-18 PROCEDURE — G8979 MOBILITY GOAL STATUS: HCPCS | Performed by: PHYSICAL THERAPIST

## 2018-09-18 PROCEDURE — G8980 MOBILITY D/C STATUS: HCPCS | Performed by: PHYSICAL THERAPIST

## 2018-09-18 NOTE — PLAN OF CARE
specific functional limitations; has resumed a walking program (1.5 miles; goal of 30-40 minutes). Overall very pleased with the outcome of both knees. \"Never thought I would have said that before surgery. \"    (-) giving way  (-) night pain  (+/-) popping/  intermittent; infrequent; after prolonged position  (-)catching/locking  (-) swelling  (-) stiffness/ (?) Fibromyalgia  (+/-) stairs/ down; (?) weather  (+/-) kneeling/squatting// limited depth; control; (+) kneeling hard surface; sensory numbness (B)      OBJECTIVE:       LEFS Score: 39/ 80= 49% (10/5/17; Preop)  LEFS Score: 42/80=53%  (11/2/17)  LEFS Score: 64/80=80% (12/12/17)  LEFS Score: 71/ 80= 89% (3/29/18)  LEFS Score: 71/ 80= 89% (9/18/18)     9-18-18            ROM PROM AROM Overpressure Comment     L R L R L R     Flexion    138 128        Extension    0  0                                               9-18-18  Strength L R Comment   Quad   See Biodex   Hamstring     See Biodex   Gastroc         Hip  flexion       Hip abd                              9-18-18  Special Test Results/Comment   Homans (-)   Temperature (-)      9-18-18  Girth L R   Mid Patella 41.7 42.6   Suprapatellar 44.2 45.4   5cm above 52.0 52.4   15cm above          Reflexes/Sensation:                         [x]Dermatomes/Myotomes intact                         [x]Reflexes equal and normal bilaterally                        []Other:     Joint mobility:                         [x]Normal                                   []Hypo                        []Hyper     Palpation: minimal tenderness     Functional Mobility/Transfers: Independent with transfer of LE from floor to table     Posture: Mild varus; prior L short (had used heel lift short term before changing shoe style)     Bandages/Dressings/Incisions: healed, mild thickening along scar     Gait:   FWB     TEST INITIAL  10-5-16 FOLLOW UP 12-19-17 FOLLOW UP  3-29-18 FOLLOW UP  9-18-18 GOAL   SINGLE LEG SQUAT TEST L: Poor (-)     R: 24% Patient: 17%      The findings of this test would result with the following summary and recommendations:  Updated test scores for postop of L TKR. Test scores reveal mild strength deficits in comparison to the R; however significant improvement since prior test (especially L quads). States no pain on the test.    Extensive review of program progression/ precautions at the 12 month PO time frame. Review of HEP/ program progression/ AHA guidelines/ long term \"OA\" precautions. RESTRICTIONS/PRECAUTIONS: : Fibromyalgia; R TKR 11/ 2016; sleep apnea (using CPAP)    Exercises/Interventions:   Exercise/Equipment Resistance/Repetitions Other comments 9/18/2018   Stretching      Hamstring 30\"x 5 Towel prop x   Hip Flexion      ITB      Grion      Quad 30\"x 5  x   Inclined Calf 30''x 5  x   Towel Pull 30\"x 5 Towel prop *         SLR      Supine 3 x 10 2# HEP   Prone      Abduction 3 x 10 2# HEP   Adducton 2 x 10 x 10\" PS; extended HEP   SLR+            Isometrics      Quad sets 2 x 10 x 10\" A/S *         Patellar Glides      Medial 3'  x   Superior 3'  x   Inferior 3'  x         ROM      Passive 5' ERMI *   Active x10 KE *   Weight Shift      Hang Weights 10' Propped *   Sheet Pulls      Ankle Pumps 5' Hourly          CKC      Calf raises 3 x 10  *   Wall sits      Step ups 3 x 10 8\"; FSU *   Tandem stance 3 x 30'' ea Aeromat *   Squatting 20-30x Chair--sit<>stand *   CC TKE 3 x 10 Silver *   Balance 4' RC L9 *         PRE      Extension 3 x 10 RANGE: 30-0, 10# *   Flexion 3 x 10 RANGE: 0-90, 20# *         Cable Column            Leg Press 3x10 RANGE: 80-10, 60# *         Bike 10' REC; R2.0 x   Treadmill 6'  1.5 mph x   Gait training 5' Cup walk with 1 AD *     Other Therapeutic Activities:   Cold Therapy    Home Exercise Program:    See above and attached. Initial HEP discussed and completed. Full written, verbal, and demonstration provided.   See attached for discussion regarding the 10/ 20/ 30' rule for strength and cardio conditioning. Patient Education:      Full postop instructions provided. Written and verbal guidelines provided for but not limited to: DME/ HEP/ ICE/ gait/ general medical instructions. Extensive discussion regarding Biodex strength results and implication to program progression. Discussion regarding ice and OTC meds as needed for swelling control. Manual Treatments:       Therapeutic Exercise and NMR EXR  [x] (06493) Provided verbal/tactile cueing for activities related to strengthening, flexibility, endurance, ROM for improvements in LE, proximal hip, and core control with self care, mobility, lifting, ambulation. [x] (95865) Provided verbal/tactile cueing for activities related to improving balance, coordination, kinesthetic sense, posture, motor skill, proprioception  to assist with LE, proximal hip, and core control in self care, mobility, lifting, ambulation and eccentric single leg control.      NMR and Therapeutic Activities:    [x] (93505 or 22732) Provided verbal/tactile cueing for activities related to improving balance, coordination, kinesthetic sense, posture, motor skill, proprioception and motor activation to allow for proper function of core, proximal hip and LE with self care and ADLs  [] (07296) Gait Re-education- Provided training and instruction to the patient for proper LE, core and proximal hip recruitment and positioning and eccentric body weight control with ambulation re-education including up and down stairs     Home Exercise Program:    [x] (09458) Reviewed/Progressed HEP activities related to strengthening, flexibility, endurance, ROM of core, proximal hip and LE for functional self-care, mobility, lifting and ambulation/stair navigation   [x] (99347)Reviewed/Progressed HEP activities related to improving balance, coordination, kinesthetic sense, posture, motor skill, proprioception of core, proximal hip and LE for self care, mobility, lifting, and ambulation/stair navigation      Manual Treatments:  PROM / STM / Oscillations-Mobs:  G-I, II, III, IV (PA's, Inf., Post.)  [] (02466) Provided manual therapy to mobilize LE, proximal hip and/or LS spine soft tissue/joints for the purpose of modulating pain, promoting relaxation,  increasing ROM, reducing/eliminating soft tissue swelling/inflammation/restriction, improving soft tissue extensibility and allowing for proper ROM for normal function with self care, mobility, lifting and ambulation. Modalities:    [] hot packs  [] EMS   [] Ultrasound  [x] ice CP  [] vasopneumatic  [] high volt/EGS  [] phono  [] tens    [] ionto  [] autorange/biodex [] Interferential  [] other     Charges:  Timed Code Treatment Minutes: 61'   Total Treatment Minutes: 61'     [] EVAL: L2   [x] YN(20399) x  1   [] IONTO  [x] NMR (01481) x  1   [] VASO  [] Manual (05256) x       [x] Other:  Physical Performance Test with Written Results  [x] TA x  1    [] Mech Traction (21956)  [] ES(attended) (62106)      [] ES (un) (47104):     GOALS:  Patient stated goal: Would like to resume full pain free walking.     Therapist goals for Patient:   Short Term Goals: To be achieved in: 2 weeks  1. Independent in HEP and progression per patient tolerance, in order to prevent re-injury. 2. Patient will have a decrease in pain to facilitate improvement in movement, function, and ADLs as indicated by Functional Deficits.     Long Term Goals: To be achieved in: 12 mos PO  1. Disability index score of 15% or less for the LEFS to assist with reaching prior level of function. 2. Patient will demonstrate increased AROM to 0-130 deg to allow for proper joint functioning as indicated by patients Functional Deficits.    3. Patient will demonstrate an increase in Strength to good proximal hip strength and control in LE to allow for proper functional mobility as indicated by patients Functional Deficits/ Biodex parameters for bilateral comparison/ TQBW and HQ ratios based on report/ objective clinical measures/ improvement with her Biodex test scores/ function testing scores. Long term precautions/ knee homeostasis progressions have been discussed.     PLAN:   [] Continue per plan of care [x] Alter current plan (see comments)  [] Plan of care initiated [] Hold pending MD visit [] Discharge  Frequency/Duration:  1 days per MD direction (completing 1 year PO protocol today)    LEFS Scale  MATT BIodex  Recreational TKR functional testing      Electronically signed by:    Mansoor Amador, PT

## 2018-09-24 ENCOUNTER — OFFICE VISIT (OUTPATIENT)
Dept: FAMILY MEDICINE CLINIC | Age: 68
End: 2018-09-24
Payer: MEDICARE

## 2018-09-24 VITALS
OXYGEN SATURATION: 98 % | HEIGHT: 62 IN | SYSTOLIC BLOOD PRESSURE: 136 MMHG | BODY MASS INDEX: 40.48 KG/M2 | DIASTOLIC BLOOD PRESSURE: 78 MMHG | WEIGHT: 220 LBS | HEART RATE: 78 BPM

## 2018-09-24 DIAGNOSIS — M94.0 COSTOCHONDRITIS: ICD-10-CM

## 2018-09-24 DIAGNOSIS — M79.7 FIBROMYALGIA: ICD-10-CM

## 2018-09-24 DIAGNOSIS — Z23 NEED FOR INFLUENZA VACCINATION: Primary | ICD-10-CM

## 2018-09-24 PROCEDURE — G0008 ADMIN INFLUENZA VIRUS VAC: HCPCS | Performed by: FAMILY MEDICINE

## 2018-09-24 PROCEDURE — 1036F TOBACCO NON-USER: CPT | Performed by: FAMILY MEDICINE

## 2018-09-24 PROCEDURE — 90662 IIV NO PRSV INCREASED AG IM: CPT | Performed by: FAMILY MEDICINE

## 2018-09-24 PROCEDURE — 1101F PT FALLS ASSESS-DOCD LE1/YR: CPT | Performed by: FAMILY MEDICINE

## 2018-09-24 PROCEDURE — 4040F PNEUMOC VAC/ADMIN/RCVD: CPT | Performed by: FAMILY MEDICINE

## 2018-09-24 PROCEDURE — 1123F ACP DISCUSS/DSCN MKR DOCD: CPT | Performed by: FAMILY MEDICINE

## 2018-09-24 PROCEDURE — 1090F PRES/ABSN URINE INCON ASSESS: CPT | Performed by: FAMILY MEDICINE

## 2018-09-24 PROCEDURE — 3017F COLORECTAL CA SCREEN DOC REV: CPT | Performed by: FAMILY MEDICINE

## 2018-09-24 PROCEDURE — 99213 OFFICE O/P EST LOW 20 MIN: CPT | Performed by: FAMILY MEDICINE

## 2018-09-24 PROCEDURE — G8417 CALC BMI ABV UP PARAM F/U: HCPCS | Performed by: FAMILY MEDICINE

## 2018-09-24 PROCEDURE — G8399 PT W/DXA RESULTS DOCUMENT: HCPCS | Performed by: FAMILY MEDICINE

## 2018-09-24 PROCEDURE — G8427 DOCREV CUR MEDS BY ELIG CLIN: HCPCS | Performed by: FAMILY MEDICINE
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
## 2019-01-29 ENCOUNTER — TELEPHONE (OUTPATIENT)
Dept: FAMILY MEDICINE CLINIC | Age: 69
End: 2019-01-29

## 2019-01-29 DIAGNOSIS — M79.7 FIBROMYALGIA: ICD-10-CM

## 2019-01-29 RX ORDER — FLUTICASONE PROPIONATE 50 MCG
2 SPRAY, SUSPENSION (ML) NASAL DAILY
Qty: 3 BOTTLE | Refills: 3 | Status: SHIPPED | OUTPATIENT
Start: 2019-01-29 | End: 2020-02-17

## 2019-01-29 RX ORDER — OMEPRAZOLE 40 MG/1
CAPSULE, DELAYED RELEASE ORAL
Qty: 90 CAPSULE | Refills: 3 | Status: SHIPPED | OUTPATIENT
Start: 2019-01-29 | End: 2020-02-11 | Stop reason: ALTCHOICE

## 2019-01-29 RX ORDER — DULOXETIN HYDROCHLORIDE 20 MG/1
20 CAPSULE, DELAYED RELEASE ORAL 2 TIMES DAILY
Qty: 180 CAPSULE | Refills: 1 | Status: SHIPPED | OUTPATIENT
Start: 2019-01-29 | End: 2019-09-17 | Stop reason: SDUPTHER

## 2019-01-29 RX ORDER — PREGABALIN 75 MG/1
75 CAPSULE ORAL 3 TIMES DAILY
Qty: 270 CAPSULE | Refills: 1 | Status: SHIPPED | OUTPATIENT
Start: 2019-01-29 | End: 2019-09-17 | Stop reason: SDUPTHER

## 2019-02-11 ENCOUNTER — TELEPHONE (OUTPATIENT)
Dept: FAMILY MEDICINE CLINIC | Age: 69
End: 2019-02-11

## 2019-02-11 DIAGNOSIS — Z23 NEED FOR PROPHYLACTIC VACCINATION AGAINST HEPATITIS A AND HEPATITIS B: Primary | ICD-10-CM

## 2019-04-01 ENCOUNTER — OFFICE VISIT (OUTPATIENT)
Dept: FAMILY MEDICINE CLINIC | Age: 69
End: 2019-04-01
Payer: MEDICARE

## 2019-04-01 VITALS
BODY MASS INDEX: 41.22 KG/M2 | HEIGHT: 62 IN | OXYGEN SATURATION: 98 % | HEART RATE: 70 BPM | SYSTOLIC BLOOD PRESSURE: 136 MMHG | WEIGHT: 224 LBS | DIASTOLIC BLOOD PRESSURE: 84 MMHG

## 2019-04-01 DIAGNOSIS — M79.7 FIBROMYALGIA: ICD-10-CM

## 2019-04-01 DIAGNOSIS — Z23 NEED FOR PROPHYLACTIC VACCINATION AGAINST STREPTOCOCCUS PNEUMONIAE (PNEUMOCOCCUS): Primary | ICD-10-CM

## 2019-04-01 DIAGNOSIS — F33.42 RECURRENT MAJOR DEPRESSIVE DISORDER, IN FULL REMISSION (HCC): ICD-10-CM

## 2019-04-01 DIAGNOSIS — E66.01 CLASS 3 SEVERE OBESITY DUE TO EXCESS CALORIES WITHOUT SERIOUS COMORBIDITY WITH BODY MASS INDEX (BMI) OF 40.0 TO 44.9 IN ADULT (HCC): ICD-10-CM

## 2019-04-01 PROCEDURE — 3017F COLORECTAL CA SCREEN DOC REV: CPT | Performed by: FAMILY MEDICINE

## 2019-04-01 PROCEDURE — 1123F ACP DISCUSS/DSCN MKR DOCD: CPT | Performed by: FAMILY MEDICINE

## 2019-04-01 PROCEDURE — 99214 OFFICE O/P EST MOD 30 MIN: CPT | Performed by: FAMILY MEDICINE

## 2019-04-01 PROCEDURE — 1036F TOBACCO NON-USER: CPT | Performed by: FAMILY MEDICINE

## 2019-04-01 PROCEDURE — G0009 ADMIN PNEUMOCOCCAL VACCINE: HCPCS | Performed by: FAMILY MEDICINE

## 2019-04-01 PROCEDURE — 1090F PRES/ABSN URINE INCON ASSESS: CPT | Performed by: FAMILY MEDICINE

## 2019-04-01 PROCEDURE — 90732 PPSV23 VACC 2 YRS+ SUBQ/IM: CPT | Performed by: FAMILY MEDICINE

## 2019-04-01 PROCEDURE — G8427 DOCREV CUR MEDS BY ELIG CLIN: HCPCS | Performed by: FAMILY MEDICINE

## 2019-04-01 PROCEDURE — G8417 CALC BMI ABV UP PARAM F/U: HCPCS | Performed by: FAMILY MEDICINE

## 2019-04-01 PROCEDURE — G8399 PT W/DXA RESULTS DOCUMENT: HCPCS | Performed by: FAMILY MEDICINE

## 2019-04-01 PROCEDURE — 4040F PNEUMOC VAC/ADMIN/RCVD: CPT | Performed by: FAMILY MEDICINE

## 2019-04-01 ASSESSMENT — ENCOUNTER SYMPTOMS
BACK PAIN: 1
COUGH: 0
SHORTNESS OF BREATH: 0

## 2019-04-01 NOTE — PROGRESS NOTES
Subjective:      Patient ID: Sandy Navarro is a 79 y.o. y.o. female. Here to follow up her meds / fibromyalgia. Has been stable. Feels her mood / depression doing OK  Fibromyalgia, pain chronically. Tries to keep going and do things  HPI      Chief Complaint   Patient presents with    Knee Pain     med check       Allergies   Allergen Reactions    Cortisone Acetate Other (See Comments)     agitation       Past Medical History:   Diagnosis Date    Anxiety     Arthritis     knee    Back pain     Depression     Fibromyalgia     Hemorrhoids     Obesity     PONV (postoperative nausea and vomiting)     Reflux     Sleep apnea     uses CPAP    TMJ syndrome     Vision impairment     glasses       Past Surgical History:   Procedure Laterality Date    BREAST SURGERY      reduction    BROW LIFT Bilateral 11/12/13    FINGER TRIGGER RELEASE  12/9/11    left thumb and cyst    FINGER TRIGGER RELEASE Bilateral     4th finger    FINGER TRIGGER RELEASE Right 7/6/2015    index and long    FINGER TRIGGER RELEASE Left 2/22/16    FOOT SURGERY  1980's    Bilat.  feet /toes shaved\"    JOINT REPLACEMENT Right 11/09/2016    knee    JOINT REPLACEMENT Left 10/09/2017    knee    KNEE ARTHROSCOPY  11-    Left knee video arthroscopy, partial medial and lateral meniscectomy    KNEE SURGERY  3/17/2011    Video arthroscopy withpartial medial menisectomy rt. knee    MOUTH SURGERY      OTHER SURGICAL HISTORY Left     dequervains    OTHER SURGICAL HISTORY  10/09/2017    LEFT TOTAL KNEE ARTHROPLASTY              TOTAL KNEE ARTHROPLASTY Right 11/09/2016    TUBAL LIGATION         Social History     Socioeconomic History    Marital status:      Spouse name: Not on file    Number of children: Not on file    Years of education: Not on file    Highest education level: Not on file   Occupational History    Not on file   Social Needs    Financial resource strain: Not on file    Food insecurity: back pain and myalgias. Upper back and trap / neck area pain / fibromyalgia like sx / pain  Makes herself do things and stay active. Has limitations. Neurological: Negative for seizures, facial asymmetry, light-headedness and numbness. Psychiatric/Behavioral: Positive for dysphoric mood. Negative for self-injury, sleep disturbance and suicidal ideas. Functioning fairly well. Some depressed mood but functions at a good level. No dark thoughts       Objective:   Physical Exam   Constitutional: She is oriented to person, place, and time. She appears well-developed and well-nourished. No distress. Eyes: EOM are normal. No scleral icterus. Neck: Neck supple. No thyromegaly present. Cardiovascular: Normal rate and regular rhythm. Pulmonary/Chest: Breath sounds normal. No respiratory distress. She has no wheezes. Abdominal: Bowel sounds are normal. She exhibits no distension. There is no tenderness. Musculoskeletal: She exhibits no edema. Trap and cervical dysfunction. Left trap trigger point. Thoracic somatic dysfunction     Lymphadenopathy:     She has no cervical adenopathy. Neurological: She is alert and oriented to person, place, and time. Skin: Skin is warm and dry. Psychiatric: Her behavior is normal.   Mood / affect decent. No overt abnormal / depressed behaviors   Vitals reviewed. Assessment:      Fibromyalgia  MDD  Obesity        Plan:     Set up fasting labs- thyroid, sugar , lipids etc   FIT test , colon cancer screen discussed. Family hx neg    Gabapentin  / vs Lyrica discussed, will do what she is doing, which is the lyrica. Fibromyalgia managed and has debility but forces herself to function  Mood managed OK.   Depression in remission    Continue the current medication  Continue the current activities    Pneumococcal vaccine today          Current Outpatient Medications   Medication Sig Dispense Refill    omeprazole (PRILOSEC) 40 MG delayed release capsule TAKE ONE CAPSULE BY MOUTH ONCE DAILY 90 capsule 3    pregabalin (LYRICA) 75 MG capsule Take 1 capsule by mouth 3 times daily for 184 days. . 270 capsule 1    DULoxetine (CYMBALTA) 20 MG extended release capsule Take 1 capsule by mouth 2 times daily 180 capsule 1    fluticasone (FLONASE) 50 MCG/ACT nasal spray 2 sprays by Nasal route daily 3 Bottle 3    aspirin 81 MG tablet Take 81 mg by mouth 2 times daily      calcium carbonate (TUMS) 500 MG chewable tablet Take 1 tablet by mouth daily      Loratadine (CLARITIN PO) Take 10 mg by mouth daily as needed       Nutritional Supplements (JUICE PLUS FIBRE PO) Take by mouth daily      VITAMIN E PO Take 400 Units by mouth as needed (hot flashes)        No current facility-administered medications for this visit.

## 2019-04-02 DIAGNOSIS — E55.9 VITAMIN D DEFICIENCY: ICD-10-CM

## 2019-04-02 DIAGNOSIS — I10 ESSENTIAL HYPERTENSION: Primary | ICD-10-CM

## 2019-04-02 DIAGNOSIS — E78.5 HYPERLIPIDEMIA, UNSPECIFIED HYPERLIPIDEMIA TYPE: ICD-10-CM

## 2019-04-03 ENCOUNTER — NURSE ONLY (OUTPATIENT)
Dept: FAMILY MEDICINE CLINIC | Age: 69
End: 2019-04-03
Payer: MEDICARE

## 2019-04-03 DIAGNOSIS — E55.9 VITAMIN D DEFICIENCY: ICD-10-CM

## 2019-04-03 DIAGNOSIS — I10 ESSENTIAL HYPERTENSION: ICD-10-CM

## 2019-04-03 DIAGNOSIS — Z12.11 COLON CANCER SCREENING: Primary | ICD-10-CM

## 2019-04-03 DIAGNOSIS — Z23 NEED FOR HEPATITIS A IMMUNIZATION: Primary | ICD-10-CM

## 2019-04-03 DIAGNOSIS — E78.5 HYPERLIPIDEMIA, UNSPECIFIED HYPERLIPIDEMIA TYPE: ICD-10-CM

## 2019-04-03 LAB
ALBUMIN SERPL-MCNC: 4.4 G/DL (ref 3.4–5)
ALP BLD-CCNC: 92 U/L (ref 40–129)
ALT SERPL-CCNC: 24 U/L (ref 10–40)
ANION GAP SERPL CALCULATED.3IONS-SCNC: 12 MMOL/L (ref 3–16)
AST SERPL-CCNC: 23 U/L (ref 15–37)
BASOPHILS ABSOLUTE: 0 K/UL (ref 0–0.2)
BASOPHILS RELATIVE PERCENT: 0.9 %
BILIRUB SERPL-MCNC: 0.4 MG/DL (ref 0–1)
BILIRUBIN DIRECT: <0.2 MG/DL (ref 0–0.3)
BILIRUBIN, INDIRECT: NORMAL MG/DL (ref 0–1)
BUN BLDV-MCNC: 17 MG/DL (ref 7–20)
CALCIUM SERPL-MCNC: 9.4 MG/DL (ref 8.3–10.6)
CHLORIDE BLD-SCNC: 106 MMOL/L (ref 99–110)
CHOLESTEROL, TOTAL: 232 MG/DL (ref 0–199)
CO2: 23 MMOL/L (ref 21–32)
CONTROL: NORMAL
CREAT SERPL-MCNC: 0.6 MG/DL (ref 0.6–1.2)
EOSINOPHILS ABSOLUTE: 0.2 K/UL (ref 0–0.6)
EOSINOPHILS RELATIVE PERCENT: 3.6 %
GFR AFRICAN AMERICAN: >60
GFR NON-AFRICAN AMERICAN: >60
GLUCOSE BLD-MCNC: 118 MG/DL (ref 70–99)
HCT VFR BLD CALC: 39.2 % (ref 36–48)
HDLC SERPL-MCNC: 80 MG/DL (ref 40–60)
HEMOCCULT STL QL: NORMAL
HEMOGLOBIN: 13 G/DL (ref 12–16)
LDL CHOLESTEROL CALCULATED: 133 MG/DL
LYMPHOCYTES ABSOLUTE: 1.2 K/UL (ref 1–5.1)
LYMPHOCYTES RELATIVE PERCENT: 22.1 %
MCH RBC QN AUTO: 28.7 PG (ref 26–34)
MCHC RBC AUTO-ENTMCNC: 33.1 G/DL (ref 31–36)
MCV RBC AUTO: 86.9 FL (ref 80–100)
MONOCYTES ABSOLUTE: 0.5 K/UL (ref 0–1.3)
MONOCYTES RELATIVE PERCENT: 8.6 %
NEUTROPHILS ABSOLUTE: 3.5 K/UL (ref 1.7–7.7)
NEUTROPHILS RELATIVE PERCENT: 64.8 %
PDW BLD-RTO: 14 % (ref 12.4–15.4)
PLATELET # BLD: 303 K/UL (ref 135–450)
PMV BLD AUTO: 8.3 FL (ref 5–10.5)
POTASSIUM SERPL-SCNC: 4.4 MMOL/L (ref 3.5–5.1)
RBC # BLD: 4.51 M/UL (ref 4–5.2)
SODIUM BLD-SCNC: 141 MMOL/L (ref 136–145)
TOTAL PROTEIN: 7.1 G/DL (ref 6.4–8.2)
TRIGL SERPL-MCNC: 93 MG/DL (ref 0–150)
TSH REFLEX: 1.44 UIU/ML (ref 0.27–4.2)
VITAMIN D 25-HYDROXY: 16.1 NG/ML
VLDLC SERPL CALC-MCNC: 19 MG/DL
WBC # BLD: 5.5 K/UL (ref 4–11)

## 2019-04-03 PROCEDURE — 90471 IMMUNIZATION ADMIN: CPT | Performed by: FAMILY MEDICINE

## 2019-04-03 PROCEDURE — 90632 HEPA VACCINE ADULT IM: CPT | Performed by: FAMILY MEDICINE

## 2019-04-03 PROCEDURE — 82274 ASSAY TEST FOR BLOOD FECAL: CPT | Performed by: FAMILY MEDICINE

## 2019-04-05 ASSESSMENT — ENCOUNTER SYMPTOMS: GASTROINTESTINAL NEGATIVE: 1

## 2019-04-09 ENCOUNTER — TELEPHONE (OUTPATIENT)
Dept: FAMILY MEDICINE CLINIC | Age: 69
End: 2019-04-09

## 2019-04-09 NOTE — TELEPHONE ENCOUNTER
Future Appointments   Date Time Provider Vickey Rea   8/22/2019  9:00 AM TOM Maldonado - CNP [facility] 4/1/2019

## 2019-04-09 NOTE — TELEPHONE ENCOUNTER
Please Release All lab results to my chart patient is looking for Basic Metabolic Panel  Please advise

## 2019-04-10 ENCOUNTER — TELEPHONE (OUTPATIENT)
Dept: FAMILY MEDICINE CLINIC | Age: 69
End: 2019-04-10

## 2019-04-10 NOTE — TELEPHONE ENCOUNTER
Future Appointments   Date Time Provider Vickey Rea   8/22/2019  9:00 AM TOM Ornelas - CNP [facility] 4/1/2019

## 2019-06-24 ENCOUNTER — HOSPITAL ENCOUNTER (OUTPATIENT)
Dept: WOMENS IMAGING | Age: 69
Discharge: HOME | End: 2019-06-24
Payer: MEDICARE

## 2019-06-24 DIAGNOSIS — Z12.31 SCREENING MAMMOGRAM, ENCOUNTER FOR: ICD-10-CM

## 2019-06-24 PROCEDURE — 77063 BREAST TOMOSYNTHESIS BI: CPT

## 2019-07-03 ENCOUNTER — OFFICE VISIT (OUTPATIENT)
Dept: PULMONOLOGY | Age: 69
End: 2019-07-03
Payer: MEDICARE

## 2019-07-03 VITALS
HEART RATE: 62 BPM | HEIGHT: 62 IN | OXYGEN SATURATION: 99 % | SYSTOLIC BLOOD PRESSURE: 128 MMHG | BODY MASS INDEX: 41.04 KG/M2 | DIASTOLIC BLOOD PRESSURE: 82 MMHG | WEIGHT: 223 LBS

## 2019-07-03 DIAGNOSIS — E66.01 MORBID OBESITY WITH BMI OF 40.0-44.9, ADULT (HCC): ICD-10-CM

## 2019-07-03 DIAGNOSIS — J30.2 SEASONAL ALLERGIES: Chronic | ICD-10-CM

## 2019-07-03 DIAGNOSIS — G47.33 OBSTRUCTIVE SLEEP APNEA: Primary | ICD-10-CM

## 2019-07-03 PROCEDURE — 1090F PRES/ABSN URINE INCON ASSESS: CPT | Performed by: NURSE PRACTITIONER

## 2019-07-03 PROCEDURE — 4040F PNEUMOC VAC/ADMIN/RCVD: CPT | Performed by: NURSE PRACTITIONER

## 2019-07-03 PROCEDURE — 99213 OFFICE O/P EST LOW 20 MIN: CPT | Performed by: NURSE PRACTITIONER

## 2019-07-03 PROCEDURE — G8427 DOCREV CUR MEDS BY ELIG CLIN: HCPCS | Performed by: NURSE PRACTITIONER

## 2019-07-03 PROCEDURE — 3017F COLORECTAL CA SCREEN DOC REV: CPT | Performed by: NURSE PRACTITIONER

## 2019-07-03 PROCEDURE — 1123F ACP DISCUSS/DSCN MKR DOCD: CPT | Performed by: NURSE PRACTITIONER

## 2019-07-03 PROCEDURE — G8417 CALC BMI ABV UP PARAM F/U: HCPCS | Performed by: NURSE PRACTITIONER

## 2019-07-03 PROCEDURE — 1036F TOBACCO NON-USER: CPT | Performed by: NURSE PRACTITIONER

## 2019-07-03 PROCEDURE — G8399 PT W/DXA RESULTS DOCUMENT: HCPCS | Performed by: NURSE PRACTITIONER

## 2019-07-03 ASSESSMENT — ENCOUNTER SYMPTOMS
SHORTNESS OF BREATH: 0
EYE REDNESS: 0
EYE PAIN: 0
COUGH: 0
SINUS PRESSURE: 0
ABDOMINAL PAIN: 0
RHINORRHEA: 0
APNEA: 0
ABDOMINAL DISTENTION: 0

## 2019-07-03 ASSESSMENT — SLEEP AND FATIGUE QUESTIONNAIRES
HOW LIKELY ARE YOU TO NOD OFF OR FALL ASLEEP IN A CAR, WHILE STOPPED FOR A FEW MINUTES IN TRAFFIC: 0
HOW LIKELY ARE YOU TO NOD OFF OR FALL ASLEEP WHEN YOU ARE A PASSENGER IN A CAR FOR AN HOUR WITHOUT A BREAK: 0
HOW LIKELY ARE YOU TO NOD OFF OR FALL ASLEEP WHILE SITTING AND READING: 0
HOW LIKELY ARE YOU TO NOD OFF OR FALL ASLEEP WHILE SITTING INACTIVE IN A PUBLIC PLACE: 0
HOW LIKELY ARE YOU TO NOD OFF OR FALL ASLEEP WHILE WATCHING TV: 0
ESS TOTAL SCORE: 0
HOW LIKELY ARE YOU TO NOD OFF OR FALL ASLEEP WHILE LYING DOWN TO REST IN THE AFTERNOON WHEN CIRCUMSTANCES PERMIT: 0
HOW LIKELY ARE YOU TO NOD OFF OR FALL ASLEEP WHILE SITTING QUIETLY AFTER LUNCH WITHOUT ALCOHOL: 0
HOW LIKELY ARE YOU TO NOD OFF OR FALL ASLEEP WHILE SITTING AND TALKING TO SOMEONE: 0

## 2019-07-03 NOTE — ASSESSMENT & PLAN NOTE
Reviewed compliance download with pt. Supplies and parts as needed for her machine. These are medically necessary. Continue medications per her PCP and other physicians. Limit caffeine use after 3pm.  Encouraged her to work on weight loss through diet and exercise. Diagnoses of Morbid obesity with BMI of 40.0-44.9, adult (HCC) and Seasonal allergies were pertinent to this visit. The chronic medical conditions listed are directly related to the primary diagnosis listed above. The management of the primary diagnosis affects the secondary diagnosis and vice versa.

## 2019-07-03 NOTE — PROGRESS NOTES
Alcohol/week: 3.6 oz     Types: 6 Cans of beer per week    Drug use: No    Sexual activity: Yes     Partners: Male   Lifestyle    Physical activity:     Days per week: Not on file     Minutes per session: Not on file    Stress: Not on file   Relationships    Social connections:     Talks on phone: Not on file     Gets together: Not on file     Attends [employer] service: Not on file     Active member of club or organization: Not on file     Attends meetings of clubs or organizations: Not on file     Relationship status: Not on file    Intimate partner violence:     Fear of current or ex partner: Not on file     Emotionally abused: Not on file     Physically abused: Not on file     Forced sexual activity: Not on file   Other Topics Concern    Not on file   Social History Narrative    Not on file       Prior to Admission medications    Medication Sig Start Date End Date Taking? Authorizing Provider   omeprazole (PRILOSEC) 40 MG delayed release capsule TAKE ONE CAPSULE BY MOUTH ONCE DAILY 1/29/19  Yes Binh Beebe DO   pregabalin (LYRICA) 75 MG capsule Take 1 capsule by mouth 3 times daily for 184 days. . 1/29/19 8/1/19 Yes Binh Bebee DO   DULoxetine (CYMBALTA) 20 MG extended release capsule Take 1 capsule by mouth 2 times daily 1/29/19  Yes Binh Beebe DO   fluticasone [facility]) 50 MCG/ACT nasal spray 2 sprays by Nasal route daily 1/29/19  Yes Binh Beebe DO   calcium carbonate (TUMS) 500 MG chewable tablet Take 1 tablet by mouth daily   Yes Historical Provider, MD   Loratadine (CLARITIN PO) Take 10 mg by mouth daily as needed    Yes Historical Provider, MD   VITAMIN E PO Take 400 Units by mouth as needed (hot flashes)    Yes Historical Provider, MD       Allergies as of 07/03/2019 - Review Complete 07/03/2019   Allergen Reaction Noted    Cortisone acetate Other (See Comments) 11/18/2010       Patient Active Problem List   Diagnosis    Fibromyalgia    Seasonal allergies    Trigger finger    Depression    Osteoarthritis of both knees    Trigger middle finger of left hand    Plantar fasciitis, right    Primary osteoarthritis of right knee    Status post total right knee replacement    Obstructive sleep apnea       Past Medical History:   Diagnosis Date    Anxiety     Arthritis     knee    Back pain     Depression     Fibromyalgia     Hemorrhoids     Obesity     PONV (postoperative nausea and vomiting)     Reflux     Sleep apnea     uses CPAP    TMJ syndrome     Vision impairment     glasses       Past Surgical History:   Procedure Laterality Date    BREAST SURGERY      reduction    BROW LIFT Bilateral 11/12/13    FINGER TRIGGER RELEASE  12/9/11    left thumb and cyst    FINGER TRIGGER RELEASE Bilateral     4th finger    FINGER TRIGGER RELEASE Right 7/6/2015    index and long    FINGER TRIGGER RELEASE Left 2/22/16    FOOT SURGERY  1980's    Bilat. feet /toes shaved\"    JOINT REPLACEMENT Right 11/09/2016    knee    JOINT REPLACEMENT Left 10/09/2017    knee    KNEE ARTHROSCOPY  11-    Left knee video arthroscopy, partial medial and lateral meniscectomy    KNEE SURGERY  3/17/2011    Video arthroscopy withpartial medial menisectomy rt. knee    MOUTH SURGERY      OTHER SURGICAL HISTORY Left     dequervains    OTHER SURGICAL HISTORY  10/09/2017    LEFT TOTAL KNEE ARTHROPLASTY              TOTAL KNEE ARTHROPLASTY Right 11/09/2016    TUBAL LIGATION         Family History   Problem Relation Age of Onset    Cancer Father     Diabetes Sister     Other Sister         CHELSI    Stroke Mother     Cancer Brother         lung    Other Brother         CHELSI    Heart Disease Brother         angina       Vitals:  Weight BMI   Wt Readings from Last 3 Encounters:   07/03/19 223 lb (101.2 kg)   04/01/19 224 lb (101.6 kg)   09/24/18 220 lb (99.8 kg)    Body mass index is 40.79 kg/m².      BP HR SaO2   BP Readings from Last 3 Encounters:   07/03/19 128/82   04/01/19 136/84   09/24/18

## 2019-07-09 ENCOUNTER — TELEPHONE (OUTPATIENT)
Dept: FAMILY MEDICINE CLINIC | Age: 69
End: 2019-07-09

## 2019-07-09 NOTE — TELEPHONE ENCOUNTER
Pt called because she got bit by a bug and the itch is bothersome and was hoping to get an antibiotic or something for the itch ( [facility] .  She has upcoming appt 07/17

## 2019-09-06 ENCOUNTER — NURSE ONLY (OUTPATIENT)
Dept: FAMILY MEDICINE CLINIC | Age: 69
End: 2019-09-06
Payer: MEDICARE

## 2019-09-06 DIAGNOSIS — Z23 NEED FOR HEPATITIS A IMMUNIZATION: Primary | ICD-10-CM

## 2019-09-06 PROCEDURE — 90632 HEPA VACCINE ADULT IM: CPT | Performed by: FAMILY MEDICINE

## 2019-09-06 PROCEDURE — 90471 IMMUNIZATION ADMIN: CPT | Performed by: FAMILY MEDICINE

## 2020-01-30 ENCOUNTER — TELEPHONE (OUTPATIENT)
Dept: FAMILY MEDICINE CLINIC | Age: 70
End: 2020-01-30

## 2020-01-30 NOTE — TELEPHONE ENCOUNTER
Pt handy cap sticker is expiring in march, but she will not be in town in march.  She wants to know if dr Willy Wing can provide her a new one before then please f/u.wants a call back

## 2020-02-11 ENCOUNTER — OFFICE VISIT (OUTPATIENT)
Dept: FAMILY MEDICINE CLINIC | Age: 70
End: 2020-02-11
Payer: MEDICARE

## 2020-02-11 VITALS
SYSTOLIC BLOOD PRESSURE: 104 MMHG | TEMPERATURE: 98.8 F | WEIGHT: 221 LBS | BODY MASS INDEX: 40.42 KG/M2 | RESPIRATION RATE: 16 BRPM | OXYGEN SATURATION: 95 % | HEART RATE: 71 BPM | DIASTOLIC BLOOD PRESSURE: 64 MMHG

## 2020-02-11 PROCEDURE — 90471 IMMUNIZATION ADMIN: CPT | Performed by: FAMILY MEDICINE

## 2020-02-11 PROCEDURE — 99214 OFFICE O/P EST MOD 30 MIN: CPT | Performed by: FAMILY MEDICINE

## 2020-02-11 PROCEDURE — 1090F PRES/ABSN URINE INCON ASSESS: CPT | Performed by: FAMILY MEDICINE

## 2020-02-11 PROCEDURE — 4040F PNEUMOC VAC/ADMIN/RCVD: CPT | Performed by: FAMILY MEDICINE

## 2020-02-11 PROCEDURE — 90715 TDAP VACCINE 7 YRS/> IM: CPT | Performed by: FAMILY MEDICINE

## 2020-02-11 PROCEDURE — 1123F ACP DISCUSS/DSCN MKR DOCD: CPT | Performed by: FAMILY MEDICINE

## 2020-02-11 PROCEDURE — 1036F TOBACCO NON-USER: CPT | Performed by: FAMILY MEDICINE

## 2020-02-11 PROCEDURE — 3017F COLORECTAL CA SCREEN DOC REV: CPT | Performed by: FAMILY MEDICINE

## 2020-02-11 PROCEDURE — G8417 CALC BMI ABV UP PARAM F/U: HCPCS | Performed by: FAMILY MEDICINE

## 2020-02-11 PROCEDURE — G8399 PT W/DXA RESULTS DOCUMENT: HCPCS | Performed by: FAMILY MEDICINE

## 2020-02-11 PROCEDURE — G8482 FLU IMMUNIZE ORDER/ADMIN: HCPCS | Performed by: FAMILY MEDICINE

## 2020-02-11 PROCEDURE — G8427 DOCREV CUR MEDS BY ELIG CLIN: HCPCS | Performed by: FAMILY MEDICINE

## 2020-02-11 RX ORDER — CHOLECALCIFEROL (VITAMIN D3) 125 MCG
CAPSULE ORAL
COMMUNITY
End: 2022-09-27

## 2020-02-11 ASSESSMENT — PATIENT HEALTH QUESTIONNAIRE - PHQ9
1. LITTLE INTEREST OR PLEASURE IN DOING THINGS: 0
SUM OF ALL RESPONSES TO PHQ QUESTIONS 1-9: 0
SUM OF ALL RESPONSES TO PHQ9 QUESTIONS 1 & 2: 0
2. FEELING DOWN, DEPRESSED OR HOPELESS: 0
SUM OF ALL RESPONSES TO PHQ QUESTIONS 1-9: 0

## 2020-02-11 ASSESSMENT — ENCOUNTER SYMPTOMS
BACK PAIN: 1
RESPIRATORY NEGATIVE: 1

## 2020-02-11 NOTE — PROGRESS NOTES
Subjective:      Patient ID: Cesar Lofton is a 81 y.o. y.o. female. Here to follow her meds / fibro  Has been generally stable. Going on 2 month trip to [address]. Gets stiff and painful, but continues to try to walk for exercise. History and medications reviewed. HPI      Chief Complaint   Patient presents with    Medication Check     fibromyalgia    Mole     on her chest - wants a referral for Derm       Allergies   Allergen Reactions    Cortisone Acetate Other (See Comments)     agitation       Past Medical History:   Diagnosis Date    Anxiety     Arthritis     knee    Back pain     Depression     Fibromyalgia     Hemorrhoids     Obesity     PONV (postoperative nausea and vomiting)     Reflux     Sleep apnea     uses CPAP    TMJ syndrome     Vision impairment     glasses       Past Surgical History:   Procedure Laterality Date    BREAST SURGERY      reduction    BROW LIFT Bilateral 11/12/13    FINGER TRIGGER RELEASE  12/9/11    left thumb and cyst    FINGER TRIGGER RELEASE Bilateral     4th finger    FINGER TRIGGER RELEASE Right 7/6/2015    index and long    FINGER TRIGGER RELEASE Left 2/22/16    FOOT SURGERY  1980's    Bilat.  feet /toes shaved\"    JOINT REPLACEMENT Right 11/09/2016    knee    JOINT REPLACEMENT Left 10/09/2017    knee    KNEE ARTHROSCOPY  11-    Left knee video arthroscopy, partial medial and lateral meniscectomy    KNEE SURGERY  3/17/2011    Video arthroscopy withpartial medial menisectomy rt. knee    MOUTH SURGERY      OTHER SURGICAL HISTORY Left     dequervains    OTHER SURGICAL HISTORY  10/09/2017    LEFT TOTAL KNEE ARTHROPLASTY              TOTAL KNEE ARTHROPLASTY Right 11/09/2016    TUBAL LIGATION         Social History     Socioeconomic History    Marital status:      Spouse name: Not on file    Number of children: Not on file    Years of education: Not on file    Highest education level: Not on file   Occupational History    light-headedness and numbness. Psychiatric/Behavioral: Positive for dysphoric mood. Negative for self-injury and suicidal ideas. Mood stable. Objective:   Physical Exam  Vitals signs reviewed. Constitutional:       General: She is not in acute distress. Appearance: She is not ill-appearing. Neck:      Musculoskeletal: Neck supple. Cardiovascular:      Rate and Rhythm: Normal rate and regular rhythm. Heart sounds: No murmur. Pulmonary:      Effort: Pulmonary effort is normal. No respiratory distress. Musculoskeletal:      Comments: Cervical and trap dysfunction. Neuro grossly intact. Gait a bit antalgic   Lymphadenopathy:      Cervical: No cervical adenopathy. Neurological:      General: No focal deficit present. Mental Status: She is alert and oriented to person, place, and time. Psychiatric:         Thought Content: Thought content normal.         Judgment: Judgment normal.      Comments: Affect and mood good. Assessment:        Fibromyalgia  Major depressive disorder, in remission  Obesity     Plan:   Continue the current medication. Looks stable. Exercise and diet are discussed. Continue the current medication. Has eaten today, so will do labs once he can be fasting.                 Current Outpatient Medications   Medication Sig Dispense Refill    Cholecalciferol (VITAMIN D3) 50 MCG (2000 UT) TABS Take by mouth      pregabalin (LYRICA) 75 MG capsule TAKE 1 CAPSULE THREE TIMES DAILY  270 capsule 1    DULoxetine (CYMBALTA) 20 MG extended release capsule TAKE 1 CAPSULE TWICE DAILY 180 capsule 1    fluticasone (FLONASE) 50 MCG/ACT nasal spray 2 sprays by Nasal route daily 3 Bottle 3    calcium carbonate (TUMS) 500 MG chewable tablet Take 1 tablet by mouth daily      Loratadine (CLARITIN PO) Take 10 mg by mouth daily as needed       VITAMIN E PO Take 400 Units by mouth as needed (hot flashes)        No current facility-administered medications for this visit.

## 2020-02-17 ENCOUNTER — TELEPHONE (OUTPATIENT)
Dept: FAMILY MEDICINE CLINIC | Age: 70
End: 2020-02-17

## 2020-02-17 RX ORDER — OMEPRAZOLE 40 MG/1
CAPSULE, DELAYED RELEASE ORAL
Qty: 90 CAPSULE | Refills: 3 | Status: SHIPPED | OUTPATIENT
Start: 2020-02-17 | End: 2020-08-11 | Stop reason: SDUPTHER

## 2020-02-17 RX ORDER — DULOXETIN HYDROCHLORIDE 20 MG/1
CAPSULE, DELAYED RELEASE ORAL
Qty: 180 CAPSULE | Refills: 3 | Status: SHIPPED | OUTPATIENT
Start: 2020-02-17 | End: 2020-08-11 | Stop reason: SDUPTHER

## 2020-02-17 RX ORDER — FLUTICASONE PROPIONATE 50 MCG
SPRAY, SUSPENSION (ML) NASAL
Qty: 3 BOTTLE | Refills: 2 | Status: SHIPPED | OUTPATIENT
Start: 2020-02-17 | End: 2020-08-11 | Stop reason: SDUPTHER

## 2020-02-17 NOTE — TELEPHONE ENCOUNTER
Future Appointments   Date Time Provider Vickey Rea   7/15/2020 12:00 PM TOM Valera - CNP [facility] 2/11/2020

## 2020-07-01 ENCOUNTER — TELEPHONE (OUTPATIENT)
Dept: PULMONOLOGY | Age: 70
End: 2020-07-01

## 2020-07-15 ENCOUNTER — VIRTUAL VISIT (OUTPATIENT)
Dept: PULMONOLOGY | Age: 70
End: 2020-07-15
Payer: MEDICARE

## 2020-07-15 PROBLEM — E66.813 CLASS 3 SEVERE OBESITY WITHOUT SERIOUS COMORBIDITY WITH BODY MASS INDEX (BMI) OF 40.0 TO 44.9 IN ADULT: Status: ACTIVE | Noted: 2020-07-15

## 2020-07-15 PROBLEM — E66.01 CLASS 3 SEVERE OBESITY WITHOUT SERIOUS COMORBIDITY WITH BODY MASS INDEX (BMI) OF 40.0 TO 44.9 IN ADULT (HCC): Status: ACTIVE | Noted: 2020-07-15

## 2020-07-15 PROCEDURE — 1090F PRES/ABSN URINE INCON ASSESS: CPT | Performed by: NURSE PRACTITIONER

## 2020-07-15 PROCEDURE — G8427 DOCREV CUR MEDS BY ELIG CLIN: HCPCS | Performed by: NURSE PRACTITIONER

## 2020-07-15 PROCEDURE — 4040F PNEUMOC VAC/ADMIN/RCVD: CPT | Performed by: NURSE PRACTITIONER

## 2020-07-15 PROCEDURE — 99214 OFFICE O/P EST MOD 30 MIN: CPT | Performed by: NURSE PRACTITIONER

## 2020-07-15 PROCEDURE — G8399 PT W/DXA RESULTS DOCUMENT: HCPCS | Performed by: NURSE PRACTITIONER

## 2020-07-15 PROCEDURE — 3017F COLORECTAL CA SCREEN DOC REV: CPT | Performed by: NURSE PRACTITIONER

## 2020-07-15 PROCEDURE — 1123F ACP DISCUSS/DSCN MKR DOCD: CPT | Performed by: NURSE PRACTITIONER

## 2020-07-15 ASSESSMENT — SLEEP AND FATIGUE QUESTIONNAIRES
ESS TOTAL SCORE: 1
HOW LIKELY ARE YOU TO NOD OFF OR FALL ASLEEP WHILE SITTING AND TALKING TO SOMEONE: 0
HOW LIKELY ARE YOU TO NOD OFF OR FALL ASLEEP WHILE SITTING INACTIVE IN A PUBLIC PLACE: 0
HOW LIKELY ARE YOU TO NOD OFF OR FALL ASLEEP WHILE LYING DOWN TO REST IN THE AFTERNOON WHEN CIRCUMSTANCES PERMIT: 0
HOW LIKELY ARE YOU TO NOD OFF OR FALL ASLEEP WHILE SITTING QUIETLY AFTER LUNCH WITHOUT ALCOHOL: 0
HOW LIKELY ARE YOU TO NOD OFF OR FALL ASLEEP IN A CAR, WHILE STOPPED FOR A FEW MINUTES IN TRAFFIC: 0
HOW LIKELY ARE YOU TO NOD OFF OR FALL ASLEEP WHEN YOU ARE A PASSENGER IN A CAR FOR AN HOUR WITHOUT A BREAK: 0
HOW LIKELY ARE YOU TO NOD OFF OR FALL ASLEEP WHILE SITTING AND READING: 0
HOW LIKELY ARE YOU TO NOD OFF OR FALL ASLEEP WHILE WATCHING TV: 1

## 2020-07-15 NOTE — PROGRESS NOTES
Sadaf Urbano MD, FAASM, [facility]  Esme Do, MSN, RN, CNP     [address] [facility] SLEEP MEDICINE  [place] [address] [address] [place] [address]  Dept: [phone]  Dept Fax: [phone]: [address] SLEEP MEDICINE  [address] [place] [id number] [phone]    Subjective:     Patient ID: Yue Cotton is a 81 y.o. female. Chief Complaint   Patient presents with    Sleep Apnea       HPI:      Sleep Medicine Video Visit    Pursuant to the emergency declaration under the [address] [place] [address], [facility] waiver authority and the [employer] Act this Telephone Visit was insisted, with patient's consent, to reduce the patient's risk of exposure to COVID-19 and provide continuity of care for an established patient. Services were provided through a synchronous discussion over a telephone and/or Video chat to substitute for in-person clinic visit, and coded as such. While patient is at home. Machine Modem/Download Info:  Compliance (hours/night): 10 hrs/night  Download AHI (/hour): 2.9 /HR  Average CPAP Pressure : 11.4 cmH2O           APAP - Settings  Pressure Min: 10 cmH2O  Pressure Max: 18 cmH2O                 Comfort Settings  Humidity Level (0-8): 2  Flex/EPR (0-3): 3 PAP Mask  Mask Type: Nasal pillows     Brick - Total score: 1    Follow-up :     Last Visit : July 2020      Patient reports the listed chronic Co-morbidities: Allergies, Obesity   are well controlled and stable at this time.      Subjective Health Changes: None      Over Night Oximetry: [] Yes  [] No  [x] NA [] WNL   Using O2: [] Yes  [] No  [x] NA   Patient is compliant with the machine  [x] Yes  [] No   Feeling rested when using the machine   [x] Yes  [] No     Pressure is comfortable with inspiration and expiration  [x] Yes  [] No     Noticed changes in pressure   [] Yes  [] No  [x] NA   Mask is fitting well  [x] Yes  [] No   Noting Mask Air Leak  [] Yes  [x] No   Having painful Aerophagia  [] Yes  [x] No   Nocturia   0  per night. Having  HA upon waking  [] Yes  [x] No   Dry mouth upon waking   Dry Nose  Dry Eyes  [] Yes  [x] No   Congestion upon waking   [] Yes  [x] No    Nose Bleeds  [] Yes  [x] No   Using Sleep Aides    [x] NA   Understands how to change humidification and/or tubing temperature for comfort while at home  [x] Yes  [] No     Difficulties falling asleep  [] Yes  [x] No   Difficulties staying asleep  [] Yes  [x] No   Approximate time to bed  9pm   Approximate wake time  6am   Taking Naps  no   If taking naps usual length    [x] NA   If taking naps using the machine  [] Yes  [] No  [x] NA [] With and With out    Drowsy when driving  [] Yes  [x] No     Does patient carry a DOT/CDL  [] Yes  [x] No     Does patient carry FAA/[employer] License   [] Yes  [x] No      Any concerns noted with the machine at this time  [] Yes  [x] No        Diagnosis Orders   1. Obstructive sleep apnea     2. Seasonal allergies     3. Class 3 severe obesity without serious comorbidity with body mass index (BMI) of 40.0 to 44.9 in adult, unspecified obesity type [facility])         The chronic medical conditions listed are directly related to the primary diagnosis listed above. The management of the primary diagnosis affects the secondary diagnosis and vice versa. Assessment/Plan:     Seasonal allergies  Chronic- Stable. Cont meds per PCP and other physicians. Class 3 severe obesity without serious comorbidity with body mass index (BMI) of 40.0 to 44.9 in adult ([facility])  Chronic-Stable. Encouraged her to work on weight loss through diet and exercise. Obstructive sleep apnea  Reviewed compliance download with pt. Supplies and parts as needed for her machine. These are medically necessary. Continue medications per her PCP and other physicians.  Limit caffeine use after 3pm.  Encouraged her to work on weight loss through diet and exercise. Diagnoses of Seasonal allergies and Class 3 severe obesity without serious comorbidity with body mass index (BMI) of 40.0 to 44.9 in adult, unspecified obesity type [facility]) were pertinent to this visit. The chronic medical conditions listed are directly related to the primary diagnosis listed above. The management of the primary diagnosis affects the secondary diagnosis and vice versa. The primary encounter diagnosis was Obstructive sleep apnea. Diagnoses of Seasonal allergies and Class 3 severe obesity without serious comorbidity with body mass index (BMI) of 40.0 to 44.9 in adult, unspecified obesity type [facility]) were also pertinent to this visit. The chronic medical conditions listed are directly related to the primary diagnosis listed above. The management of the primary diagnosis affects the secondary diagnosis and vice versa. - Educated patient and reviewed compliance download with pt.    -Supplies and parts as needed for her machine, these are medically necessary.    - Patient using [facility] [employer] (may consider switching providers) for supplies  -Continue medications per her PCP and other physicians.   -Limit caffeine use after 3pm.    -Encouraged her to work on weight loss through diet and exercise. -  Patient able to access video feed. Visit completed via video chat communications. 20 min spent with patient.   - Education on supplies, tube temperature, humidifier, and rain out   -F/U: 12 month. No orders of the defined types were placed in this encounter. No orders of the defined types were placed in this encounter. No orders of the defined types were placed in this encounter.       Barb Alfonso, MSN, RN, CNP

## 2020-07-15 NOTE — LETTER
[facility]  [place] [address] [place] [address]  Phone: [phone]  Fax: [phone]    July 15, 2020       Patient: Melinda Lee   MR Number: <[id number]>   YOB: 1950   Date of Visit: 7/15/2020       Melinda Lee was seen for a follow up visit today. Here is my assessment and plan as well as an attached copy of her visit today:    Seasonal allergies  Chronic- Stable. Cont meds per PCP and other physicians. Class 3 severe obesity without serious comorbidity with body mass index (BMI) of 40.0 to 44.9 in adult ([facility])  Chronic-Stable. Encouraged her to work on weight loss through diet and exercise. Obstructive sleep apnea  Reviewed compliance download with pt. Supplies and parts as needed for her machine. These are medically necessary. Continue medications per her PCP and other physicians. Limit caffeine use after 3pm.  Encouraged her to work on weight loss through diet and exercise. Diagnoses of Seasonal allergies and Class 3 severe obesity without serious comorbidity with body mass index (BMI) of 40.0 to 44.9 in adult, unspecified obesity type [facility]) were pertinent to this visit. The chronic medical conditions listed are directly related to the primary diagnosis listed above. The management of the primary diagnosis affects the secondary diagnosis and vice versa. If you have questions or concerns, please do not hesitate to call me. I look forward to following Juana Bansal along with you.     Sincerely,    Francy Diggs, APRN - CNP    CC providers:  Patsy No, DO  [address] [facility]  [address]  [address] [place] [address]  VIA [place]

## 2020-07-15 NOTE — PROGRESS NOTES
Yovany Alfonso         : 1950    Diagnosis: [x] CHELSI (G47.33) [] CSA (G47.31) [] Apnea (G47.30)   Length of Need: [x] 12 Months [] 99 Months [] Other:    Machine (EMORY!): [x] Respironics Dream Station      Auto [] ResMed AirSense     Auto [] Other:     []  CPAP () [] Bilevel ()   Mode: [] Auto [] Spontaneous    Mode: [] Auto [] Spontaneous                            Comfort Settings:   - Ramp Pressure:  cmH2O                                        - Ramp time: 15 min                                     -  Flex/EPR - 3 full time                                    - For ResMed Bilevel (TiMax-4 sec   TiMin- 0.2 sec)     Humidifier: [x] Heated ()        [x] Water chamber replacement ()/ 1 per 6 months        Mask:   [x] Nasal () /1 per 3 months [] Full Face () /1 per 3 months   [x] Patient choice -Size and fit mask [] Patient Choice - Size and fit mask   [] Dispense:  [] Dispense:    [x] Headgear () / 1 per 3 months [] Headgear () / 1 per 3 months   [] Replacement Nasal Cushion ()/2 per month [] Interface Replacement ()/1 per month   [x] Replacement Nasal Pillows ()/2 per month         Tubing: [x] Heated ()/1 per 3 months    [] Standard ()/1 per 3 months [] Other:           Filters: [x] Non-disposable ()/1 per 6 months     [x] Ultra-Fine, Disposable ()/2 per month        Miscellaneous: [] Chin Strap ()/ 1 per 6 months [] O2 bleed-in:       LPM   [] Oximetry on CPAP/Bilevel []  Other:    [x] Modem: ()         Start Order Date: 07/15/20    MEDICAL JUSTIFICATION:  I, the undersigned, certify that the above prescribed supplies are medically necessary for this patients wellbeing. In my opinion, the supplies are both reasonable and necessary in reference to accepted standards of medicalpractice in treatment of this patients condition.     True Daria APRN - CNP      NPI: [id number]       Order Signed Date: 07/15/20    Electronically signed by TOM Davis CNP on 7/15/2020 at 12:12 PM

## 2020-07-15 NOTE — ASSESSMENT & PLAN NOTE
Reviewed compliance download with pt. Supplies and parts as needed for her machine. These are medically necessary. Continue medications per her PCP and other physicians. Limit caffeine use after 3pm.  Encouraged her to work on weight loss through diet and exercise. Diagnoses of Seasonal allergies and Class 3 severe obesity without serious comorbidity with body mass index (BMI) of 40.0 to 44.9 in adult, unspecified obesity type [facility]) were pertinent to this visit. The chronic medical conditions listed are directly related to the primary diagnosis listed above. The management of the primary diagnosis affects the secondary diagnosis and vice versa.

## 2020-08-10 ENCOUNTER — NURSE TRIAGE (OUTPATIENT)
Dept: OTHER | Facility: CLINIC | Age: 70
End: 2020-08-10

## 2020-08-10 NOTE — TELEPHONE ENCOUNTER
Reason for Disposition   SEVERE pain (e.g., excruciating, unable to do any normal activities)    Answer Assessment - Initial Assessment Questions  1. ONSET: \"When did the pain start? \"       2 weeks ago   2. LOCATION: \"Where is the pain located? \"       Right heel and up the leg towards the knee  3. PAIN: \"How bad is the pain? \"    (Scale 1-10; or mild, moderate, severe)    -  MILD (1-3): doesn't interfere with normal activities     -  MODERATE (4-7): interferes with normal activities (e.g., work or school) or awakens from sleep, limping     -  SEVERE (8-10): excruciating pain, unable to do any normal activities, unable to walk      Pain is at a moderate, hard to walk  4. WORK OR EXERCISE: \"Has there been any recent work or exercise that involved this part of the body? \"     no  5. CAUSE: \"What do you think is causing the foot pain? \"     Possible fasciatis  6. OTHER SYMPTOMS: \"Do you have any other symptoms? \" (e.g., leg pain, rash, fever, numbness)      swollen  7. PREGNANCY: \"Is there any chance you are pregnant? \" \"When was your last menstrual period? \"     no    Protocols used: FOOT PAIN-ADULT-[place]    Has pain in right foot for 2-3 weeks. Has fasciatus in it previously and thinks this could be the same thing. Has some swelling and the pain goes up the leg. Recommend per protocol to be seen by PCP within next 2 days and she agrees. Can do a VV. Received call from [employer]. Call soft transferred to [address] to schedule appointment. Please do not reply to the triage nurse through this encounter. Any subsequent communication should be directly with the patient.

## 2020-08-11 ENCOUNTER — VIRTUAL VISIT (OUTPATIENT)
Dept: FAMILY MEDICINE CLINIC | Age: 70
End: 2020-08-11
Payer: MEDICARE

## 2020-08-11 PROCEDURE — 3017F COLORECTAL CA SCREEN DOC REV: CPT | Performed by: FAMILY MEDICINE

## 2020-08-11 PROCEDURE — 99213 OFFICE O/P EST LOW 20 MIN: CPT | Performed by: FAMILY MEDICINE

## 2020-08-11 PROCEDURE — G8427 DOCREV CUR MEDS BY ELIG CLIN: HCPCS | Performed by: FAMILY MEDICINE

## 2020-08-11 PROCEDURE — 1123F ACP DISCUSS/DSCN MKR DOCD: CPT | Performed by: FAMILY MEDICINE

## 2020-08-11 PROCEDURE — 1090F PRES/ABSN URINE INCON ASSESS: CPT | Performed by: FAMILY MEDICINE

## 2020-08-11 PROCEDURE — G8399 PT W/DXA RESULTS DOCUMENT: HCPCS | Performed by: FAMILY MEDICINE

## 2020-08-11 PROCEDURE — 4040F PNEUMOC VAC/ADMIN/RCVD: CPT | Performed by: FAMILY MEDICINE

## 2020-08-11 RX ORDER — FLUTICASONE PROPIONATE 50 MCG
SPRAY, SUSPENSION (ML) NASAL
Qty: 3 BOTTLE | Refills: 2 | Status: SHIPPED | OUTPATIENT
Start: 2020-08-11 | End: 2021-12-07 | Stop reason: SDUPTHER

## 2020-08-11 RX ORDER — OMEPRAZOLE 40 MG/1
CAPSULE, DELAYED RELEASE ORAL
Qty: 90 CAPSULE | Refills: 3 | Status: SHIPPED | OUTPATIENT
Start: 2020-08-11 | End: 2021-12-07 | Stop reason: SDUPTHER

## 2020-08-11 RX ORDER — DULOXETIN HYDROCHLORIDE 20 MG/1
CAPSULE, DELAYED RELEASE ORAL
Qty: 180 CAPSULE | Refills: 3 | Status: SHIPPED | OUTPATIENT
Start: 2020-08-11 | End: 2021-11-05 | Stop reason: SDUPTHER

## 2020-08-11 RX ORDER — PREGABALIN 75 MG/1
CAPSULE ORAL
Qty: 270 CAPSULE | Refills: 1 | Status: SHIPPED | OUTPATIENT
Start: 2020-08-11 | End: 2021-04-08 | Stop reason: SDUPTHER

## 2020-08-11 RX ORDER — ETODOLAC 400 MG/1
400 TABLET, FILM COATED ORAL 2 TIMES DAILY
Qty: 60 TABLET | Refills: 1 | Status: SHIPPED | OUTPATIENT
Start: 2020-08-11 | End: 2020-09-30

## 2020-08-11 ASSESSMENT — PATIENT HEALTH QUESTIONNAIRE - PHQ9
SUM OF ALL RESPONSES TO PHQ QUESTIONS 1-9: 0
2. FEELING DOWN, DEPRESSED OR HOPELESS: 0
SUM OF ALL RESPONSES TO PHQ9 QUESTIONS 1 & 2: 0
1. LITTLE INTEREST OR PLEASURE IN DOING THINGS: 0
SUM OF ALL RESPONSES TO PHQ QUESTIONS 1-9: 0

## 2020-08-16 ASSESSMENT — ENCOUNTER SYMPTOMS: RESPIRATORY NEGATIVE: 1

## 2020-08-16 NOTE — PATIENT INSTRUCTIONS
Stop taking ibuprofen. Begin the new anti-inflammatory I am calling in. Take it with food and watch for stomach upset. Need    Follow-up in 2-3 weeks if not better.     Continue routine meds otherwise

## 2020-08-16 NOTE — PROGRESS NOTES
2020    TELEHEALTH EVALUATION -- Audio/Visual (During GIRVY-20 public health emergency)    HPI:    Issac Riddle (:  1950) has requested an audio/video evaluation for the following concern(s):    Heel pain    This patient is \"seen\" in a telemedicine virtual visit for medication follow-up and for right heel pain. Patient has had a previous diagnosis of plantar fasciitis and feels this has returned. It is hard to walk. She has been taking ibuprofen with limited improvement. She had previously been prescribed stretching exercises which she has not been doing regularly. She overall has been doing reasonably well in light of limited activities due to the coronavirus. Review of Systems   Constitutional: Positive for activity change. Negative for chills and fever. Respiratory: Negative. Cardiovascular: Negative. Musculoskeletal:        Right heel and foot pain. Location and characteristics are consistent with her previously diagnosed plantar fasciitis. Psychiatric/Behavioral: Negative for self-injury and suicidal ideas. Prior to Visit Medications    Medication Sig Taking?  Authorizing Provider   omeprazole (PRILOSEC) 40 MG delayed release capsule TAKE 1 CAPSULE EVERY DAY Yes Kanika Ley DO   fluticasone (FLONASE) 50 MCG/ACT nasal spray USE 2 SPRAYS IN EACH NOSTRIL EVERY DAY Yes Kanika Ley DO   DULoxetine (CYMBALTA) 20 MG extended release capsule TAKE 1 CAPSULE TWICE DAILY Yes Kanika Ley DO   pregabalin (LYRICA) 75 MG capsule TAKE 1 CAPSULE THREE TIMES DAILY Yes Kanika Ley DO   etodolac (LODINE) 400 MG tablet Take 1 tablet by mouth 2 times daily Take with food, for heel Yes Kanika Ley DO   Cholecalciferol (VITAMIN D3) 50 MCG (2000 UT) TABS Take by mouth Yes Historical Provider, MD   calcium carbonate (TUMS) 500 MG chewable tablet Take 1 tablet by mouth daily Yes Historical Provider, MD   VITAMIN E PO Take 400 Units by mouth as needed (hot flashes)  Yes Historical Provider, MD   Loratadine (CLARITIN PO) Take 10 mg by mouth daily as needed   Historical Provider, MD       Social History     Tobacco Use    Smoking status: Never Smoker    Smokeless tobacco: Never Used   Substance Use Topics    Alcohol use: Yes     Alcohol/week: 6.0 standard drinks     Types: 6 Cans of beer per week    Drug use: No            PHYSICAL EXAMINATION:  [ INSTRUCTIONS:  \"[x]\" Indicates a positive item  \"[]\" Indicates a negative item  -- DELETE ALL ITEMS NOT EXAMINED]  Vital Signs: (As obtained by patient/caregiver or practitioner observation)    Blood pressure-  Heart rate-    Respiratory rate-    Temperature-  Pulse oximetry-     Constitutional: [x] Appears well-developed and well-nourished [x] No apparent distress      [] Abnormal-   Mental status  [x] Alert and awake  [x] Oriented to person/place/time [x]Able to follow commands      Eyes:  EOM    [x]  Normal  [] Abnormal-  Sclera  []  Normal  [] Abnormal -         Discharge []  None visible  [] Abnormal -    HENT:   [] Normocephalic, atraumatic.   [] Abnormal   [] Mouth/Throat: Mucous membranes are moist.     External Ears [] Normal  [] Abnormal-     Neck: [] No visualized mass     Pulmonary/Chest: [x] Respiratory effort normal.  [x] No visualized signs of difficulty breathing or respiratory distress        [] Abnormal-      Musculoskeletal:   [] Normal gait with no signs of ataxia         [] Normal range of motion of neck        [] Abnormal-       Neurological:        [x] No Facial Asymmetry (Cranial nerve 7 motor function) (limited exam to video visit)          [x] No gaze palsy        [] Abnormal-         Skin:        [] No significant exanthematous lesions or discoloration noted on facial skin         [] Abnormal-            Psychiatric:       [x] Normal Affect [x] No Hallucinations        [] Abnormal-     Other pertinent observable physical exam findings-     ASSESSMENT/PLAN:  Plantar fasciitis  Fibromyalgia, treated  Depressive used to authenticate this note.

## 2020-09-15 ENCOUNTER — TELEMEDICINE (OUTPATIENT)
Dept: FAMILY MEDICINE CLINIC | Age: 70
End: 2020-09-15
Payer: MEDICARE

## 2020-09-15 ENCOUNTER — NURSE TRIAGE (OUTPATIENT)
Dept: OTHER | Facility: CLINIC | Age: 70
End: 2020-09-15

## 2020-09-15 PROCEDURE — 1123F ACP DISCUSS/DSCN MKR DOCD: CPT | Performed by: FAMILY MEDICINE

## 2020-09-15 PROCEDURE — 4040F PNEUMOC VAC/ADMIN/RCVD: CPT | Performed by: FAMILY MEDICINE

## 2020-09-15 PROCEDURE — G8427 DOCREV CUR MEDS BY ELIG CLIN: HCPCS | Performed by: FAMILY MEDICINE

## 2020-09-15 PROCEDURE — G8399 PT W/DXA RESULTS DOCUMENT: HCPCS | Performed by: FAMILY MEDICINE

## 2020-09-15 PROCEDURE — 1090F PRES/ABSN URINE INCON ASSESS: CPT | Performed by: FAMILY MEDICINE

## 2020-09-15 PROCEDURE — 3017F COLORECTAL CA SCREEN DOC REV: CPT | Performed by: FAMILY MEDICINE

## 2020-09-15 PROCEDURE — 99213 OFFICE O/P EST LOW 20 MIN: CPT | Performed by: FAMILY MEDICINE

## 2020-09-15 RX ORDER — VALACYCLOVIR HYDROCHLORIDE 1 G/1
1000 TABLET, FILM COATED ORAL 3 TIMES DAILY
Qty: 20 TABLET | Refills: 0 | Status: SHIPPED | OUTPATIENT
Start: 2020-09-15 | End: 2020-09-30

## 2020-09-15 RX ORDER — FLUTICASONE PROPIONATE 0.05 %
CREAM (GRAM) TOPICAL
Qty: 30 G | Refills: 1 | Status: SHIPPED | OUTPATIENT
Start: 2020-09-15 | End: 2020-09-30

## 2020-09-15 ASSESSMENT — PATIENT HEALTH QUESTIONNAIRE - PHQ9
1. LITTLE INTEREST OR PLEASURE IN DOING THINGS: 0
2. FEELING DOWN, DEPRESSED OR HOPELESS: 0
SUM OF ALL RESPONSES TO PHQ9 QUESTIONS 1 & 2: 0
SUM OF ALL RESPONSES TO PHQ QUESTIONS 1-9: 0
SUM OF ALL RESPONSES TO PHQ QUESTIONS 1-9: 0

## 2020-09-15 NOTE — TELEPHONE ENCOUNTER
Reason for Disposition   Patient wants to be seen    Answer Assessment - Initial Assessment Questions  1. APPEARANCE of RASH: \"Describe the rash. \"       Red raised itchy spots. Some have white heads with pus.   2. LOCATION: \"Where is the rash located? \"       Under left breast   3. NUMBER: \"How many spots are there? \"       Numerous spots, has spread  4. SIZE: \"How big are the spots? \" (Inches, centimeters or compare to size of a coin)       Red areas are the size of match head. 5. ONSET: \"When did the rash start? \"       A few days ago. 6. ITCHING: \"Does the rash itch? \" If so, ask: \"How bad is the itch? \"  (Scale 1-10; or mild, moderate, severe)      7/10 for itching  7. PAIN: \"Does the rash hurt? \" If so, ask: \"How bad is the pain? \"  (Scale 1-10; or mild, moderate, severe)      7/10 for pain  8. OTHER SYMPTOMS: \"Do you have any other symptoms? \" (e.g., fever)      Denies any other symptoms. 9. PREGNANCY: \"Is there any chance you are pregnant? \" \"When was your last menstrual period? \"     N/A    Protocols used: RASH OR REDNESS - LOCALIZED-ADULT-[place]    Pt reports red raised itchy rash under left breast. States rash began a few days ago. States the red areas are the size of a match head and some has pus in them. Reviewed disposition with pt to be seen today by PCP. Warm transfer to [facility] in [facility] provided care advice and instructed to call back with worsening symptoms. Please do not respond to the triage nurse through this encounter. Any subsequent communication should be directly with the patient.

## 2020-09-20 ASSESSMENT — ENCOUNTER SYMPTOMS: RESPIRATORY NEGATIVE: 1

## 2020-09-21 NOTE — PATIENT INSTRUCTIONS
Keep the area clean. Begin the antiviral capsules. Use the topical cream for symptom relief. Follow in 1 week.

## 2020-09-21 NOTE — PROGRESS NOTES
9/15/2020    TELEHEALTH EVALUATION -- Audio/Visual (During IFGOG-18 public health emergency)    HPI:    Ayla Dong (:  1950) has requested an audio/video evaluation for the following concern(s):    Rash    Patient is \"seen\" in a telemedicine virtual visit with video. Her complaint is a rash under the left breast.  This has been present for a few days. Is localized and not dramatically spreading. It is red slightly raised areas with some developing blisterlike lesions. It is itchy to some degree but not dramatically painful. More uncomfortable. Does not appear to be spreading and there does not appear to be systemic symptoms. Review of Systems   Constitutional: Negative. Respiratory: Negative. Cardiovascular: Negative. Skin: Positive for rash. Neurological: Negative for seizures, facial asymmetry and numbness. Psychiatric/Behavioral:        Stable depressive disorder. Prior to Visit Medications    Medication Sig Taking? Authorizing Provider   valACYclovir (VALTREX) 1 g tablet Take 1 tablet by mouth 3 times daily Yes Zeinab Faulkner DO   fluticasone (CUTIVATE) 0.05 % cream Apply lightly, topically 2 times daily for itching.  Yes Zeinab Faulkner DO   omeprazole (PRILOSEC) 40 MG delayed release capsule TAKE 1 CAPSULE EVERY DAY Yes Zeinab Faulkner DO   fluticasone (FLONASE) 50 MCG/ACT nasal spray USE 2 SPRAYS IN EACH NOSTRIL EVERY DAY Yes Zeinab Faulkner DO   DULoxetine (CYMBALTA) 20 MG extended release capsule TAKE 1 CAPSULE TWICE DAILY Yes Zeinab Faulkner DO   pregabalin (LYRICA) 75 MG capsule TAKE 1 CAPSULE THREE TIMES DAILY Yes Zeinab Faulkner DO   etodolac (LODINE) 400 MG tablet Take 1 tablet by mouth 2 times daily Take with food, for heel Yes Zeinab Faulkner DO   Cholecalciferol (VITAMIN D3) 50 MCG ( UT) TABS Take by mouth Yes Historical Provider, MD   calcium carbonate (TUMS) 500 MG chewable tablet Take 1 tablet by mouth daily Yes Historical Provider, MD   VITAMIN E PO Affect [x] No Hallucinations        [] Abnormal-     Other pertinent observable physical exam findings-     ASSESSMENT/PLAN:  Rash consistent with shingles. The patient has had a Zostavax vaccine. We discussed that this may attenuate and not totally prevent shingles. My guess is that is the case. The patient is already taking pregabalin for fibromyalgia syndrome. We will treat with antiviral and topical anti-itch. The usual course of shingles and therapy and expectations and timing are discussed. Patient will follow-up in 1 week. Florence Torres is a 78 y.o. female being evaluated by a Virtual Visit (video visit) encounter to address concerns as mentioned above. A caregiver was present when appropriate. Due to this being a TeleHealth encounter (During [place] [id number] public health emergency), evaluation of the following organ systems was limited: Vitals/Constitutional/EENT/Resp/CV/GI//MS/Neuro/Skin/Heme-Lymph-Imm. Pursuant to the emergency declaration under the [address] [place] [address] authority and the [employer] Act, this Virtual Visit was conducted with patient's (and/or legal guardian's) consent, to reduce the patient's risk of exposure to COVID-19 and provide necessary medical care. The patient (and/or legal guardian) has also been advised to contact this office for worsening conditions or problems, and seek emergency medical treatment and/or call 911 if deemed necessary. Patient identification was verified at the start of the visit: Yes    Total time spent on this encounter: Not billed by time    Services were provided through a video synchronous discussion virtually to substitute for in-person clinic visit. Patient and provider were located at their individual homes. --Tanvir Pablo DO on 9/20/2020 at 8:13 PM    An electronic signature was used to authenticate this note.

## 2020-09-30 ENCOUNTER — OFFICE VISIT (OUTPATIENT)
Dept: FAMILY MEDICINE CLINIC | Age: 70
End: 2020-09-30
Payer: MEDICARE

## 2020-09-30 VITALS
TEMPERATURE: 97.8 F | OXYGEN SATURATION: 96 % | SYSTOLIC BLOOD PRESSURE: 128 MMHG | RESPIRATION RATE: 14 BRPM | HEART RATE: 76 BPM | HEIGHT: 62 IN | DIASTOLIC BLOOD PRESSURE: 70 MMHG | BODY MASS INDEX: 41.41 KG/M2 | WEIGHT: 225 LBS

## 2020-09-30 PROCEDURE — 4040F PNEUMOC VAC/ADMIN/RCVD: CPT | Performed by: FAMILY MEDICINE

## 2020-09-30 PROCEDURE — 1036F TOBACCO NON-USER: CPT | Performed by: FAMILY MEDICINE

## 2020-09-30 PROCEDURE — G8399 PT W/DXA RESULTS DOCUMENT: HCPCS | Performed by: FAMILY MEDICINE

## 2020-09-30 PROCEDURE — G8427 DOCREV CUR MEDS BY ELIG CLIN: HCPCS | Performed by: FAMILY MEDICINE

## 2020-09-30 PROCEDURE — 1090F PRES/ABSN URINE INCON ASSESS: CPT | Performed by: FAMILY MEDICINE

## 2020-09-30 PROCEDURE — G8417 CALC BMI ABV UP PARAM F/U: HCPCS | Performed by: FAMILY MEDICINE

## 2020-09-30 PROCEDURE — 99213 OFFICE O/P EST LOW 20 MIN: CPT | Performed by: FAMILY MEDICINE

## 2020-09-30 PROCEDURE — 1123F ACP DISCUSS/DSCN MKR DOCD: CPT | Performed by: FAMILY MEDICINE

## 2020-09-30 PROCEDURE — 3017F COLORECTAL CA SCREEN DOC REV: CPT | Performed by: FAMILY MEDICINE

## 2020-09-30 RX ORDER — FLUCONAZOLE 100 MG/1
100 TABLET ORAL DAILY
Qty: 7 TABLET | Refills: 0 | Status: SHIPPED | OUTPATIENT
Start: 2020-09-30 | End: 2020-10-07 | Stop reason: SDUPTHER

## 2020-09-30 RX ORDER — NYSTATIN 100000 U/G
CREAM TOPICAL
Qty: 30 G | Refills: 2 | Status: SHIPPED | OUTPATIENT
Start: 2020-09-30 | End: 2021-03-10 | Stop reason: ALTCHOICE

## 2020-09-30 ASSESSMENT — PATIENT HEALTH QUESTIONNAIRE - PHQ9
2. FEELING DOWN, DEPRESSED OR HOPELESS: 0
SUM OF ALL RESPONSES TO PHQ QUESTIONS 1-9: 0
SUM OF ALL RESPONSES TO PHQ QUESTIONS 1-9: 0
1. LITTLE INTEREST OR PLEASURE IN DOING THINGS: 0
SUM OF ALL RESPONSES TO PHQ9 QUESTIONS 1 & 2: 0

## 2020-09-30 NOTE — PROGRESS NOTES
Subjective:      Patient ID: Galina Worthington is a 78 y.o. y.o. female. Rash. 2 weeks. Started on the left side- under breast.  I treated as zoster. Spread to right side. Itchy !!!!!    Cortisone cream helped the itch but seemed to spread more. HPI      Chief Complaint   Patient presents with    Rash     Under both breast bad red rash       Allergies   Allergen Reactions    Cortisone Acetate Other (See Comments)     agitation       Past Medical History:   Diagnosis Date    Anxiety     Arthritis     knee    Back pain     Depression     Fibromyalgia     Hemorrhoids     Obesity     PONV (postoperative nausea and vomiting)     Reflux     Sleep apnea     uses CPAP    TMJ syndrome     Vision impairment     glasses       Past Surgical History:   Procedure Laterality Date    BREAST SURGERY      reduction    BROW LIFT Bilateral 11/12/13    FINGER TRIGGER RELEASE  12/9/11    left thumb and cyst    FINGER TRIGGER RELEASE Bilateral     4th finger    FINGER TRIGGER RELEASE Right 7/6/2015    index and long    FINGER TRIGGER RELEASE Left 2/22/16    FOOT SURGERY  1980's    Bilat.  feet /toes shaved\"    JOINT REPLACEMENT Right 11/09/2016    knee    JOINT REPLACEMENT Left 10/09/2017    knee    KNEE ARTHROSCOPY  11-    Left knee video arthroscopy, partial medial and lateral meniscectomy    KNEE SURGERY  3/17/2011    Video arthroscopy withpartial medial menisectomy rt. knee    MOUTH SURGERY      OTHER SURGICAL HISTORY Left     dequervains    OTHER SURGICAL HISTORY  10/09/2017    LEFT TOTAL KNEE ARTHROPLASTY              TOTAL KNEE ARTHROPLASTY Right 11/09/2016    TUBAL LIGATION         Social History     Socioeconomic History    Marital status:      Spouse name: Not on file    Number of children: Not on file    Years of education: Not on file    Highest education level: Not on file   Occupational History    Not on file   Social Needs    Financial resource strain: Not on file  Food insecurity     Worry: Not on file     Inability: Not on file    Transportation needs     Medical: Not on file     Non-medical: Not on file   Tobacco Use    Smoking status: Never Smoker    Smokeless tobacco: Never Used   Substance and Sexual Activity    Alcohol use: Yes     Alcohol/week: 6.0 standard drinks     Types: 6 Cans of beer per week    Drug use: No    Sexual activity: Yes     Partners: Male   Lifestyle    Physical activity     Days per week: Not on file     Minutes per session: Not on file    Stress: Not on file   Relationships    Social connections     Talks on phone: Not on file     Gets together: Not on file     Attends Zoroastrianism service: Not on file     Active member of club or organization: Not on file     Attends meetings of clubs or organizations: Not on file     Relationship status: Not on file    Intimate partner violence     Fear of current or ex partner: Not on file     Emotionally abused: Not on file     Physically abused: Not on file     Forced sexual activity: Not on file   Other Topics Concern    Not on file   Social History Narrative    Not on file       Family History   Problem Relation Age of Onset    Cancer Father     Diabetes Sister     Other Sister         CHELSI    Stroke Mother     Cancer Brother         lung    Other Brother         CHELSI    Heart Disease Brother         angina       Vitals:    09/30/20 1348   BP: 128/70   Pulse: 76   Resp: 14   Temp: 97.8 °F (36.6 °C)   SpO2: 96%       Wt Readings from Last 3 Encounters:   09/30/20 225 lb (102.1 kg)   02/11/20 221 lb (100.2 kg)   07/03/19 223 lb (101.2 kg)       Review of Systems   Constitutional: Negative for chills and fever. Musculoskeletal:        Fibromyalgia sx worse with cold / damp. Skin: Positive for rash. Objective:   Physical Exam  Vitals signs reviewed. Constitutional:       Appearance: She is not ill-appearing.    Pulmonary:      Effort: Pulmonary effort is normal.   Skin:     General: Skin is warm and dry. Comments: Interdigital / inframammary rash-  Yeast like  Left  Greater than right    Neurological:      General: No focal deficit present. Mental Status: She is alert and oriented to person, place, and time. Psychiatric:         Mood and Affect: Mood normal.         Behavior: Behavior normal.         Assessment:      dermatitis  Plantar fascitis        Plan:     Topical mycostatin  Diflucan orally. Follow 1 week if not improving. Timing and expectations discussed. PT for the plantar fasciitis. Current Outpatient Medications   Medication Sig Dispense Refill    omeprazole (PRILOSEC) 40 MG delayed release capsule TAKE 1 CAPSULE EVERY DAY 90 capsule 3    fluticasone (FLONASE) 50 MCG/ACT nasal spray USE 2 SPRAYS IN EACH NOSTRIL EVERY DAY 3 Bottle 2    DULoxetine (CYMBALTA) 20 MG extended release capsule TAKE 1 CAPSULE TWICE DAILY 180 capsule 3    pregabalin (LYRICA) 75 MG capsule TAKE 1 CAPSULE THREE TIMES DAILY 270 capsule 1    Cholecalciferol (VITAMIN D3) 50 MCG (2000 UT) TABS Take by mouth      calcium carbonate (TUMS) 500 MG chewable tablet Take 1 tablet by mouth daily      Loratadine (CLARITIN PO) Take 10 mg by mouth daily as needed       VITAMIN E PO Take 400 Units by mouth as needed (hot flashes)        No current facility-administered medications for this visit.

## 2020-10-01 ENCOUNTER — TELEPHONE (OUTPATIENT)
Dept: FAMILY MEDICINE CLINIC | Age: 70
End: 2020-10-01

## 2020-10-01 ENCOUNTER — NURSE ONLY (OUTPATIENT)
Dept: FAMILY MEDICINE CLINIC | Age: 70
End: 2020-10-01

## 2020-10-01 VITALS — TEMPERATURE: 97.3 F

## 2020-10-01 LAB
A/G RATIO: 1.8 (ref 1.1–2.2)
ALBUMIN SERPL-MCNC: 4.3 G/DL (ref 3.4–5)
ALP BLD-CCNC: 86 U/L (ref 40–129)
ALT SERPL-CCNC: 23 U/L (ref 10–40)
ANION GAP SERPL CALCULATED.3IONS-SCNC: 10 MMOL/L (ref 3–16)
AST SERPL-CCNC: 23 U/L (ref 15–37)
BASOPHILS ABSOLUTE: 0.1 K/UL (ref 0–0.2)
BASOPHILS RELATIVE PERCENT: 1.2 %
BILIRUB SERPL-MCNC: 0.5 MG/DL (ref 0–1)
BUN BLDV-MCNC: 14 MG/DL (ref 7–20)
CALCIUM SERPL-MCNC: 9.4 MG/DL (ref 8.3–10.6)
CHLORIDE BLD-SCNC: 108 MMOL/L (ref 99–110)
CHOLESTEROL, TOTAL: 223 MG/DL (ref 0–199)
CO2: 24 MMOL/L (ref 21–32)
CREAT SERPL-MCNC: 0.8 MG/DL (ref 0.6–1.2)
EOSINOPHILS ABSOLUTE: 0.2 K/UL (ref 0–0.6)
EOSINOPHILS RELATIVE PERCENT: 3.9 %
GFR AFRICAN AMERICAN: >60
GFR NON-AFRICAN AMERICAN: >60
GLOBULIN: 2.4 G/DL
GLUCOSE BLD-MCNC: 115 MG/DL (ref 70–99)
HCT VFR BLD CALC: 38.8 % (ref 36–48)
HDLC SERPL-MCNC: 81 MG/DL (ref 40–60)
HEMOGLOBIN: 12.7 G/DL (ref 12–16)
LDL CHOLESTEROL CALCULATED: 125 MG/DL
LYMPHOCYTES ABSOLUTE: 1.3 K/UL (ref 1–5.1)
LYMPHOCYTES RELATIVE PERCENT: 30 %
MCH RBC QN AUTO: 28.6 PG (ref 26–34)
MCHC RBC AUTO-ENTMCNC: 32.7 G/DL (ref 31–36)
MCV RBC AUTO: 87.5 FL (ref 80–100)
MONOCYTES ABSOLUTE: 0.4 K/UL (ref 0–1.3)
MONOCYTES RELATIVE PERCENT: 8.4 %
NEUTROPHILS ABSOLUTE: 2.5 K/UL (ref 1.7–7.7)
NEUTROPHILS RELATIVE PERCENT: 56.5 %
PDW BLD-RTO: 15.1 % (ref 12.4–15.4)
PLATELET # BLD: 264 K/UL (ref 135–450)
PMV BLD AUTO: 8.5 FL (ref 5–10.5)
POTASSIUM SERPL-SCNC: 4.7 MMOL/L (ref 3.5–5.1)
RBC # BLD: 4.43 M/UL (ref 4–5.2)
SODIUM BLD-SCNC: 142 MMOL/L (ref 136–145)
TOTAL PROTEIN: 6.7 G/DL (ref 6.4–8.2)
TRIGL SERPL-MCNC: 84 MG/DL (ref 0–150)
TSH REFLEX: 1.22 UIU/ML (ref 0.27–4.2)
VITAMIN D 25-HYDROXY: 38.1 NG/ML
VLDLC SERPL CALC-MCNC: 17 MG/DL
WBC # BLD: 4.5 K/UL (ref 4–11)

## 2020-10-02 ENCOUNTER — HOSPITAL ENCOUNTER (OUTPATIENT)
Dept: PHYSICAL THERAPY | Age: 70
Setting detail: THERAPIES SERIES
Discharge: HOME | End: 2020-10-02
Payer: MEDICARE

## 2020-10-02 PROCEDURE — 97140 MANUAL THERAPY 1/> REGIONS: CPT

## 2020-10-02 PROCEDURE — 97161 PT EVAL LOW COMPLEX 20 MIN: CPT

## 2020-10-02 PROCEDURE — 97110 THERAPEUTIC EXERCISES: CPT

## 2020-10-02 NOTE — PLAN OF CARE
The [facility] Orthopaedics and [address] [place] [address]; [facility]          Physical Therapy Certification    Dear Referring Practitioner: Dr. Danae Ovalle,    We had the pleasure of evaluating the following patient for physical therapy services at [address] [place] [address]. A summary of our findings can be found in the initial assessment below. This includes our plan of care. If you have any questions or concerns regarding these findings, please do not hesitate to contact me at the office phone number checked above. Thank you for the referral.       Physician Signature:_______________________________Date:__________________  By signing above (or electronic signature), therapists plan is approved by physician    Patient: Narda Cordova   : 1950   MRN: [id number]  Referring Physician: Referring Practitioner: Dr. Danae Ovalle      Evaluation Date: 10/2/2020      Medical Diagnosis Information:  Diagnosis: M72.2 R plantar fascitis   Treatment Diagnosis: M72.2 R plantar fascitis, R26.2 difficulty walking, M25.674 R foot stiffness                                         Insurance information: PT Insurance Information: [employer], med nec, needs auth       Precautions/ Contra-indications: None    C-SSRS Triggered by Intake questionnaire (Past 2 wk assessment):   [x] No, Questionnaire did not trigger screening.   [] Yes, Patient intake triggered further evaluation      [] C-SSRS Screening completed  [] PCP notified via Plan of Care  [] Emergency services notified      Latex Allergy:  [x]NO      []YES  Preferred Language for Healthcare:   [x]English       []other:    SUBJECTIVE: Patient stated complaint: Pt says about 11 years ago she had plantar fascitis and has been fine then about 1-2 months ago she started having similar pain and anti-inflammatories helped but now still persistent. Pt still does stretching every morning.  She currently has pain and difficulty with pain disturbing sleep, walking more than 15 minutes, standing more than 10 minutes, pt has 4 steps into her home and has more trouble going down than up. Pt numbness/tingling, pain shoots into calves. Pt denies L foot pain. Pt takes walks for 30 minutes every day of week despite the pain. Pt got new orthopedic shoes and they are helping.      Relevant Medical History: B TKA in 2016 & 2017, OA,  fibromyalgia  Functional Disability Index:  LEFS: 29/80 = 36% ability, 64% disability  Height 5'2\" Weight 225 lbs  Pain Scale: 6-7/10   Easing factors: rest, anti-inflammatories,   Provocative factors: walking, standing    Type: [x]Constant   []Intermittent  []Radiating []Localized []other:     Numbness/Tingling: denies    Occupation/School: retired    Living Status/Prior Level of Function: Independent with ADLs and IADLs, walking for exercise    OBJECTIVE:     ROM LEFT RIGHT   Knee ext     Knee Flex     Ankle  deg 152 deg   Ankle DF 5 deg 8 deg   Soleus angle 9 deg 12 deg   Ankle In 52deg 40 deg   Ankle Ev 10 deg 13 deg   Strength  LEFT RIGHT   HIP Flexors     HIP Abductors     HIP Ext     Hip ER     Knee EXT (quad)     Knee Flex (HS)     Ankle DF     Ankle PF 5/5 4/5   Ankle Inv 5/5 4-/5   Ankle EV 5/5 4-/5   Toes ext 5/5 4/5   Toes flex 5/5 4/5   Circumference  Mid apex  7 cm prox             Reflexes/Sensation:    []Dermatomes/Myotomes intact    [x]Reflexes equal and normal bilaterally   []Other:    Joint mobility:    []Normal    [x]Hypo: R hindfoot and midfoot generalized hypomobility and pain with mobilization   []Hyper    Palpation: tenderness at post tib, medial inferior calcaneus, medial arch, plantar distal FHL    Functional Mobility/Transfers: WFL    Posture: B rigid pes cavus, R>L forefoot abduction, calcaneus varus    Bandages/Dressings/Incisions: none    Gait: (include devices/WB status) antalgic with dec WB on the R and shorter step length on the R LE    Orthopedic Special Tests: n/a [x] Patient history, allergies, meds reviewed. Medical chart reviewed. See intake form. Review Of Systems (ROS):  [x]Performed Review of systems (Integumentary, CardioPulmonary, Neurological) by intake and observation. Intake form has been scanned into medical record. Patient has been instructed to contact their primary care physician regarding ROS issues if not already being addressed at this time. Co-morbidities/Complexities (which will affect course of rehabilitation)  []None           Arthritic conditions   []Rheumatoid arthritis (M05.9)  [x]Osteoarthritis (M19.91)   Cardiovascular conditions   []Hypertension (I10)  []Hyperlipidemia (E78.5)  []Angina pectoris (I20)  []Atherosclerosis (I70)   Musculoskeletal conditions   []Disc pathology   []Congenital spine pathologies   []Prior surgical intervention  []Osteoporosis (M81.8)  []Osteopenia (M85.8)   Endocrine conditions   []Hypothyroid (E03.9)  []Hyperthyroid Gastrointestinal conditions   []Constipation (H92.27)   Metabolic conditions   []Morbid obesity (E66.01)  []Diabetes type 1(E10.65) or 2 (E11.65)   []Neuropathy (G60.9)     Pulmonary conditions   []Asthma (J45)  []Coughing   []COPD (J44.9)   Psychological Disorders  []Anxiety (F41.9)  []Depression (F32.9)   []Other:   [x]Other:  fibromyalgia     Barriers to/and or personal factors that will affect rehab potential:              []Age  []Sex              []Motivation/Lack of Motivation                        [x]Co-Morbidities              []Cognitive Function, education/learning barriers              []Environmental, home barriers              []profession/work barriers  []past PT/medical experience  []other:  Justification:     Falls Risk Assessment (30 days):   [x] Falls Risk assessed and no intervention required.   [] Falls Risk assessed and Patient requires intervention due to being higher risk   TUG score (>12s at risk):     [] Falls education provided, including       ASSESSMENT: Pt demonstrates decreased hindfoot and midfoot mobility, weakness in foot intrinsic and extrinsic musculature on R, antalgic gait, tightness in plantar fascia and tenderness along FHL and post tib. Pt will benefit from PT to address these impairments to improve ambulation and ADLs. Functional Impairments:     []Noted lumbar/proximal hip/LE joint hypomobility   [x]Decreased LE functional ROM   []Decreased core/proximal hip strength and neuromuscular control   [x]Decreased LE functional strength   []Reduced balance/proprioceptive control   []other:      Functional Activity Limitations (from functional questionnaire and intake)   []Reduced ability to tolerate prolonged functional positions   []Reduced ability or difficulty with changes of positions or transfers between positions   []Reduced ability to maintain good posture and demonstrate good body mechanics with sitting, bending, and lifting   [x]Reduced ability to sleep   [] Reduced ability or tolerance with driving and/or computer work   []Reduced ability to perform lifting, carrying tasks   []Reduced ability to squat   []Reduced ability to forward bend   [x]Reduced ability to ambulate prolonged functional periods/distances/surfaces   [x]Reduced ability to ascend/descend stairs   []Reduced ability to run, hop, cut or jump   []other:    Participation Restrictions   []Reduced participation in self care activities   [x]Reduced participation in home management activities   []Reduced participation in work activities   []Reduced participation in social activities. [x]Reduced participation in sport/recreation activities. Classification :    []Signs/symptoms consistent with post-surgical status including decreased ROM, strength and function.    [x]Signs/symptoms consistent with joint sprain/strain   []Signs/symptoms consistent with patella-femoral syndrome   []Signs/symptoms consistent with knee OA/hip OA   []Signs/symptoms consistent with internal derangement of knee/Hip   []Signs/symptoms consistent with functional hip weakness/NMR control      [x]Signs/symptoms consistent with tendinitis/tendinosis    []signs/symptoms consistent with pathology which may benefit from Dry needling      []other:     Prognosis/Rehab Potential:      []Excellent   []Good    [x]Fair   []Poor    Tolerance of evaluation/treatment:    []Excellent   [x]Good    []Fair   []Poor  Physical Therapy Evaluation Complexity Justification  [x] A history of present problem with:  [] no personal factors and/or comorbidities that impact the plan of care;  [x]1-2 personal factors and/or comorbidities that impact the plan of care  []3 personal factors and/or comorbidities that impact the plan of care  [x] An examination of body systems using standardized tests and measures addressing any of the following: body structures and functions (impairments), activity limitations, and/or participation restrictions;:  [x] a total of 1-2 or more elements   [] a total of 3 or more elements   [] a total of 4 or more elements   [x] A clinical presentation with:  [x] stable and/or uncomplicated characteristics   [] evolving clinical presentation with changing characteristics  [] unstable and unpredictable characteristics;   [x] Clinical decision making of [x] low, [] moderate, [] high complexity using standardized patient assessment instrument and/or measurable assessment of functional outcome.     [x] EVAL (LOW) 64667 (typically 20 minutes face-to-face)  [] EVAL (MOD) 82543 (typically 30 minutes face-to-face)  [] EVAL (HIGH) 12219 (typically 45 minutes face-to-face)  [] RE-EVAL       PLAN:   Frequency/Duration:  1-2 days per week for 5 Weeks:  Interventions:  [x]  Therapeutic exercise including: strength training, ROM, for Lower extremity and core   [x]  NMR activation and proprioception for LE, Glutes and Core   [x]  Manual therapy as indicated for LE, Hip and spine to include: Dry Needling/IASTM, STM, PROM, Gr I-IV mobilizations, manipulation. [x] Modalities as needed that may include: thermal agents, E-stim, Biofeedback, US, iontophoresis as indicated  [x] Patient education on joint protection, postural re-education, activity modification, progression of HEP. HEP instruction: (see scanned forms)    GOALS:  Patient stated goal: walking without pain  [] Progressing: [] Met: [] Not Met: [] Adjusted    Therapist goals for Patient:   Short Term Goals: To be achieved in: 2 weeks  1. Independent in HEP and progression per patient tolerance, in order to prevent re-injury. [] Progressing: [] Met: [] Not Met: [] Adjusted  2. Patient will have a decrease in pain to facilitate improvement in movement, function, and ADLs as indicated by Functional Deficits. [] Progressing: [] Met: [] Not Met: [] Adjusted    Long Term Goals: To be achieved in: 5 weeks  1. Disability index score of 10% or less for the LEFS to assist with reaching prior level of function. [] Progressing: [] Met: [] Not Met: [] Adjusted  2. Patient will demonstrate normalized hindfoot and forefoot joint mobility symmetrical to uninvolved limb to allow for proper joint functioning as indicated by patients Functional Deficits. [] Progressing: [] Met: [] Not Met: [] Adjusted  3. Patient will demonstrate an increase in Strength to good proximal hip strength and control, within 5lb HHD in LE to allow for proper functional mobility as indicated by patients Functional Deficits. [] Progressing: [] Met: [] Not Met: [] Adjusted  4. Patient will be able to sleep at least 6 hours undisturbed by foot pain. [] Progressing: [] Met: [] Not Met: [] Adjusted  5. Pt will be able to walk with a normalized gait pattern without increased symptoms for at least 1 hour.    [] Progressing: [] Met: [] Not Met: [] Adjusted     Electronically signed by:  Twila Garvin, PT

## 2020-10-02 NOTE — FLOWSHEET NOTE
The [facility] Orthopaedics and [address] [place] [address]; [place]         Physical Therapy Treatment Note/ Progress Report:           Date:  10/2/2020    Patient Name:  Taylor Ye    :  1950  MRN: [id number]  Restrictions/Precautions:    Medical/Treatment Diagnosis Information:  · Diagnosis: M72.2 R plantar fascitis  · Treatment Diagnosis: M72.2 R plantar fascitis, R26.2 difficulty walking, M25.674 R foot stiffness  Insurance/Certification information:  PT Insurance Information: [employer], med [facility], needs auth  Physician Information:  Referring Practitioner: Dr. Opal Tolbert  Has the plan of care been signed (Y/N):        []  Yes  [x]  No     Date of Patient follow up with Physician: as needed      Is this a Progress Report:     []  Yes  [x]  No        If Yes:  Date Range for reporting period:  Beginning 10/2/202  Ending    Progress report will be due (10 Rx or 30 days whichever is less):       Recertification will be due (POC Duration  / 90 days whichever is less): 2020      Visit # Insurance Allowable Auth Required   In-person 1 Med nec [x]  Yes []  No    Telehealth - ? []  Yes []  No    Total          Functional Scale: 2980 = 36% ability, 64% disability   Date assessed:  10/2/2020          Number of Comorbidities:  []0     [x]1-2    []3+    Latex Allergy:  [x]NO      []YES  Preferred Language for Healthcare:   [x]English       []other:      Pain level:  6-7/10     SUBJECTIVE:  See eval    OBJECTIVE: See eval   Observation:    Test measurements:      RESTRICTIONS/PRECAUTIONS: none    Exercises/Interventions:     Therapeutic Ex (22504) Sets/Reps/ sec Notes/CUES   Toe curls 1x15 HEp   Toes extension 1x15 HEP   Foot doming 1x15 HEP   FHL wall stretch 1x30\" HEP                                      Pt edu  Proper footwear rather than always wearing Crocks.     Manual Intervention (63998)     Hindfoot & midfoot jt mobilization gd 2-3, 8 min promoting relaxation,  increasing ROM, reducing/eliminating soft tissue swelling/inflammation/restriction, improving soft tissue extensibility and allowing for proper ROM for normal function with self care, mobility, lifting and ambulation. Modalities:     [] GAME READY (VASO)- for significant edema, swelling, pain control. Charges:  Timed Code Treatment Minutes: 23    Total Treatment Minutes: 48       [x] EVAL (LOW) 65881   [] EVAL (MOD) 10301  [] EVAL (HIGH) 60482   [] RE-EVAL     [x] WL(20388) x1     [] IONTO  [] NMR (16389) x     [] VASO  [x] Manual (41261) x  1    [] Other:  [] TA x      [] Mech Traction (48823)  [] ES(attended) (68351)      [] ES (un) (35259):       GOALS:   Patient stated goal: walking without pain  [] Progressing: [] Met: [] Not Met: [] Adjusted    Therapist goals for Patient:   Short Term Goals: To be achieved in: 2 weeks  1. Independent in HEP and progression per patient tolerance, in order to prevent re-injury. [] Progressing: [] Met: [] Not Met: [] Adjusted  2. Patient will have a decrease in pain to facilitate improvement in movement, function, and ADLs as indicated by Functional Deficits. [] Progressing: [] Met: [] Not Met: [] Adjusted    Long Term Goals: To be achieved in: 5 weeks  1. Disability index score of 10% or less for the LEFS to assist with reaching prior level of function. [] Progressing: [] Met: [] Not Met: [] Adjusted  2. Patient will demonstrate normalized hindfoot and forefoot joint mobility symmetrical to uninvolved limb to allow for proper joint functioning as indicated by patients Functional Deficits. [] Progressing: [] Met: [] Not Met: [] Adjusted  3. Patient will demonstrate an increase in Strength to good proximal hip strength and control, within 5lb HHD in LE to allow for proper functional mobility as indicated by patients Functional Deficits. [] Progressing: [] Met: [] Not Met: [] Adjusted  4.  Patient will be able to sleep at least 6 hours undisturbed by foot pain. [] Progressing: [] Met: [] Not Met: [] Adjusted  5. Pt will be able to walk with a normalized gait pattern without increased symptoms for at least 1 hour. [] Progressing: [] Met: [] Not Met: [] Adjusted     Progression Towards Functional goals:  [] Patient is progressing as expected towards functional goals listed. [] Progression is slowed due to complexities listed. [] Progression has been slowed due to co-morbidities. [x] Plan just implemented, too soon to assess goals progression  [] Other:         Overall Progression Towards Functional goals/ Treatment Progress Update:  [] Patient is progressing as expected towards functional goals listed. [] Progression is slowed due to complexities/Impairments listed. [] Progression has been slowed due to co-morbidities. [x] Plan just implemented, too soon to assess goals progression <30days   [] Goals require adjustment due to lack of progress  [] Patient is not progressing as expected and requires additional follow up with physician  [] Other    Prognosis for POC: [x] Good [] Fair  [] Poor      Patient requires continued skilled intervention: [x] Yes  [] No    Treatment/Activity Tolerance:  [x] Patient able to complete treatment  [] Patient limited by fatigue  [] Patient limited by pain    [] Patient limited by other medical complications  [] Other:     ASSESSMENT: Pt reported that the foot joint mobilizations relieved some of her pain and also liked the foot intrinsic exercises though she found them difficulty to do. PLAN: See eval. Pt to be seen 2x wk for 2 weeks then 1x wk for 3 weeks.  Next visit, cont jt mobs, STM to post tib, ankle tband  [] Continue per plan of care [] Alter current plan (see comments above)  [x] Plan of care initiated [] Hold pending MD visit [] Discharge      Electronically signed by:  Marisol Bahena PT    Note: If patient does not return for scheduled/ recommended follow up visits, this note will serve as a discharge from care along with most recent update on progress.

## 2020-10-05 ENCOUNTER — HOSPITAL ENCOUNTER (OUTPATIENT)
Dept: PHYSICAL THERAPY | Age: 70
Setting detail: THERAPIES SERIES
Discharge: HOME | End: 2020-10-05
Payer: MEDICARE

## 2020-10-05 ENCOUNTER — TELEPHONE (OUTPATIENT)
Dept: FAMILY MEDICINE CLINIC | Age: 70
End: 2020-10-05

## 2020-10-05 PROCEDURE — 97140 MANUAL THERAPY 1/> REGIONS: CPT

## 2020-10-05 PROCEDURE — 97110 THERAPEUTIC EXERCISES: CPT

## 2020-10-05 NOTE — FLOWSHEET NOTE
[facility] Orthopaedics and Sports Rehabilitation; [place]         Physical Therapy Treatment Note/ Progress Report:           Date:  10/5/2020    Patient Name:  Fernando Peguero    :  1950  MRN: [id number]  Restrictions/Precautions:    Medical/Treatment Diagnosis Information:  · Diagnosis: M72.2 R plantar fascitis  · Treatment Diagnosis: M72.2 R plantar fascitis, R26.2 difficulty walking, M25.674 R foot stiffness  Insurance/Certification information:  PT Insurance Information: [employer], med [employer], needs auth  Physician Information:  Referring Practitioner: Dr. Mehrdad Palmer  Has the plan of care been signed (Y/N):        []  Yes  [x]  No     Date of Patient follow up with Physician: as needed      Is this a Progress Report:     []  Yes  [x]  No        If Yes:  Date Range for reporting period:  Beginning 10/2/202  Ending    Progress report will be due (10 Rx or 30 days whichever is less):       Recertification will be due (POC Duration  / 90 days whichever is less): 2020      Visit # Insurance Allowable Auth Required   In-person 2 Med nec [x]  Yes []  No    Telehealth - ? []  Yes []  No    Total 2         Functional Scale:  = 36% ability, 64% disability   Date assessed:  10/2/2020          Number of Comorbidities:  []0     [x]1-2    []3+    Latex Allergy:  [x]NO      []YES  Preferred Language for Healthcare:   [x]English       []other:      Pain level:  3/10     SUBJECTIVE:  Pt says she has been feeling a little better since last visit though prolonged walking still bothers it.      OBJECTIVE:    Observation: tender and crepitus in R plantar fascia and post tib, pt still wearing Crocks   Test measurements:      RESTRICTIONS/PRECAUTIONS: none    Exercises/Interventions:     Therapeutic Ex (17778) Sets/Reps/ sec Notes/CUES   Toe curls HEp   Toes extension HEP   Foot doming 2x15 HEP   FHL wall stretch 1x30\" HEP   Tband PF, inv, ev 2x15 yellow HEP 10/5 Towel scrunch 2lbs 2x4 towel length    Ecc Heel raise on incline 2x10 DL HEP 10/5                       Pt edu  Proper footwear rather than always wearing Crocks. Manual Intervention (78910)     Hindfoot & midfoot jt mobilization gd 2-3, 5 min    STM: planar fascia , FHL 10 min                        NMR re-education (30216)  CUES NEEDED   SLB airex 2x30\"    SLB turn head 1x30\"    Tandem on airex, turn head 1x30\" ea                                  Therapeutic Activity (18904)                                         Therapeutic Exercise and NMR EXR  [x] (89317) Provided verbal/tactile cueing for activities related to strengthening, flexibility, endurance, ROM for improvements in LE, proximal hip, and core control with self care, mobility, lifting, ambulation. [x] (48713) Provided verbal/tactile cueing for activities related to improving balance, coordination, kinesthetic sense, posture, motor skill, proprioception  to assist with LE, proximal hip, and core control in self care, mobility, lifting, ambulation and eccentric single leg control.      NMR and Therapeutic Activities:    [] (37531 or 31933) Provided verbal/tactile cueing for activities related to improving balance, coordination, kinesthetic sense, posture, motor skill, proprioception and motor activation to allow for proper function of core, proximal hip and LE with self care and ADLs  [] (53339) Gait Re-education- Provided training and instruction to the patient for proper LE, core and proximal hip recruitment and positioning and eccentric body weight control with ambulation re-education including up and down stairs     Home Exercise Program:    [x] (52614) Reviewed/Progressed HEP activities related to strengthening, flexibility, endurance, ROM of core, proximal hip and LE for functional self-care, mobility, lifting and ambulation/stair navigation   [] (03079)Reviewed/Progressed HEP activities related to improving balance, coordination, kinesthetic sense, posture, motor skill, proprioception of core, proximal hip and LE for self care, mobility, lifting, and ambulation/stair navigation      Manual Treatments:  PROM / STM / Oscillations-Mobs:  G-I, II, III, IV (PA's, Inf., Post.)  [x] (39007) Provided manual therapy to mobilize LE, proximal hip and/or LS spine soft tissue/joints for the purpose of modulating pain, promoting relaxation,  increasing ROM, reducing/eliminating soft tissue swelling/inflammation/restriction, improving soft tissue extensibility and allowing for proper ROM for normal function with self care, mobility, lifting and ambulation. Modalities:     [] GAME READY (VASO)- for significant edema, swelling, pain control. Charges:  Timed Code Treatment Minutes: 45    Total Treatment Minutes: 45       [] EVAL (LOW) 08189   [] EVAL (MOD) 75904  [] EVAL (HIGH) 63570   [] RE-EVAL     [x] RONALD(38288) x2     [] IONTO  [] NMR (16662) x     [] VASO  [x] Manual (35984) x  1    [] Other:  [] TA x      [] Mech Traction (20822)  [] ES(attended) (58806)      [] ES (un) (60972):       GOALS:   Patient stated goal: walking without pain  [] Progressing: [] Met: [] Not Met: [] Adjusted    Therapist goals for Patient:   Short Term Goals: To be achieved in: 2 weeks  1. Independent in HEP and progression per patient tolerance, in order to prevent re-injury. [] Progressing: [] Met: [] Not Met: [] Adjusted  2. Patient will have a decrease in pain to facilitate improvement in movement, function, and ADLs as indicated by Functional Deficits. [] Progressing: [] Met: [] Not Met: [] Adjusted    Long Term Goals: To be achieved in: 5 weeks  1. Disability index score of 10% or less for the LEFS to assist with reaching prior level of function. [] Progressing: [] Met: [] Not Met: [] Adjusted  2.  Patient will demonstrate normalized hindfoot and forefoot joint mobility symmetrical to uninvolved limb to allow for proper joint functioning as indicated by patients Functional Deficits. [] Progressing: [] Met: [] Not Met: [] Adjusted  3. Patient will demonstrate an increase in Strength to good proximal hip strength and control, within 5lb HHD in LE to allow for proper functional mobility as indicated by patients Functional Deficits. [] Progressing: [] Met: [] Not Met: [] Adjusted  4. Patient will be able to sleep at least 6 hours undisturbed by foot pain. [] Progressing: [] Met: [] Not Met: [] Adjusted  5. Pt will be able to walk with a normalized gait pattern without increased symptoms for at least 1 hour. [] Progressing: [] Met: [] Not Met: [] Adjusted     Progression Towards Functional goals:  [] Patient is progressing as expected towards functional goals listed. [] Progression is slowed due to complexities listed. [] Progression has been slowed due to co-morbidities. [x] Plan just implemented, too soon to assess goals progression  [] Other:         Overall Progression Towards Functional goals/ Treatment Progress Update:  [] Patient is progressing as expected towards functional goals listed. [] Progression is slowed due to complexities/Impairments listed. [] Progression has been slowed due to co-morbidities. [x] Plan just implemented, too soon to assess goals progression <30days   [] Goals require adjustment due to lack of progress  [] Patient is not progressing as expected and requires additional follow up with physician  [] Other    Prognosis for POC: [x] Good [] Fair  [] Poor      Patient requires continued skilled intervention: [x] Yes  [] No    Treatment/Activity Tolerance:  [x] Patient able to complete treatment  [] Patient limited by fatigue  [] Patient limited by pain    [] Patient limited by other medical complications  [] Other:     ASSESSMENT: Pt had much less pain after the STM but still has soreness in heel in WB on a firm surface. PLAN: See eval. Pt to be seen 2x wk for 2 weeks then 1x wk for 3 weeks.  Next visit, cont jt mobs, STM to post tib, ankle tband  [x] Continue per plan of care [] Alter current plan (see comments above)  [] Plan of care initiated [] Hold pending MD visit [] Discharge      Electronically signed by:  Kyung Stevens PT    Note: If patient does not return for scheduled/ recommended follow up visits, this note will serve as a discharge from care along with most recent update on progress.

## 2020-10-07 ENCOUNTER — HOSPITAL ENCOUNTER (OUTPATIENT)
Dept: PHYSICAL THERAPY | Age: 70
Setting detail: THERAPIES SERIES
Discharge: HOME | End: 2020-10-07
Payer: MEDICARE

## 2020-10-07 ENCOUNTER — TELEPHONE (OUTPATIENT)
Dept: FAMILY MEDICINE CLINIC | Age: 70
End: 2020-10-07

## 2020-10-07 PROCEDURE — 97140 MANUAL THERAPY 1/> REGIONS: CPT

## 2020-10-07 PROCEDURE — 97110 THERAPEUTIC EXERCISES: CPT

## 2020-10-07 RX ORDER — FLUCONAZOLE 100 MG/1
100 TABLET ORAL DAILY
Qty: 7 TABLET | Refills: 0 | Status: SHIPPED | OUTPATIENT
Start: 2020-10-07 | End: 2020-10-14

## 2020-10-07 NOTE — TELEPHONE ENCOUNTER
Spoke to physical therapy for details on the dexamethasone. Was not able to prescribe it through epic. Call that into the [facility] in [facility]. It is dexamethasone solution 4 mg/mL. Dispense 30 mL vial.  2 refills. It will be used topically in iontophoresis.     Left a message on the patient's home phone to pick the prescription up at [phone].

## 2020-10-07 NOTE — FLOWSHEET NOTE
[facility] Orthopaedics and Sports Rehabilitation; [facility]         Physical Therapy Treatment Note/ Progress Report:           Date:  10/7/2020    Patient Name:  Sabine Chase    :  1950  MRN: [id number]  Restrictions/Precautions:    Medical/Treatment Diagnosis Information:  · Diagnosis: M72.2 R plantar fascitis  · Treatment Diagnosis: M72.2 R plantar fascitis, R26.2 difficulty walking, M25.674 R foot stiffness  Insurance/Certification information:  PT Insurance Information: [employer] [facility], needs auth  Physician Information:  Referring Practitioner: Dr. Nani Motley  Has the plan of care been signed (Y/N):        []  Yes  [x]  No     Date of Patient follow up with Physician: as needed      Is this a Progress Report:     []  Yes  [x]  No        If Yes:  Date Range for reporting period:  Beginning 10/2/202  Ending    Progress report will be due (10 Rx or 30 days whichever is less):       Recertification will be due (POC Duration  / 90 days whichever is less): 2020      Visit # Insurance Allowable Auth Required   In-person 3 Med nec [x]  Yes []  No    Telehealth - ? []  Yes []  No    Total 3         Functional Scale: 2980 = 36% ability, 64% disability   Date assessed:  10/2/2020          Number of Comorbidities:  []0     [x]1-2    []3+    Latex Allergy:  [x]NO      []YES  Preferred Language for Healthcare:   [x]English       []other:      Pain level:  0-1/10     SUBJECTIVE:  Pt report she has been feeling slightly better since last visit. She has noticed imporved flexibilty at her ankle with going up steps and during the day while walking around its less sore but still has increased pain at evening and trying to go to bed at night. She has been wearing her tennis shoes more as I suggested and she is amazed how much better they feel than her Crocks.  Pt still has not received her ionto medication but while she was here her MD called and I was able to talk to their office again about the need for the prescription and PT order. OBJECTIVE:    Observation: mild tender and crepitus in R plantar fascia and post tib   Test measurements:      RESTRICTIONS/PRECAUTIONS: none    Exercises/Interventions:     Therapeutic Ex (58067) Sets/Reps/ sec Notes/CUES   Toe curls 1x15 HEp   Toes extension 1x15 HEP   Foot doming w/ lift off 2x15 HEP   FHL wall stretch 1x30\" HEP   Tband PF, inv, ev 2x15 yellow HEP 10/5   Towel scrunch     Ecc Heel raise on incline  HEP 10/5   Calf towel stretch 2x30\" R/L                   Pt edu  Proper footwear rather than always wearing Crocks. Manual Intervention (07533)     Hindfoot & midfoot jt mobilization gd 2-3, 5 min    STM: planar fascia , FHL 10 min                        NMR re-education (51266)  CUES NEEDED   SLB airex    SLB turn head    Tandem on airex, turn head                                  Therapeutic Activity (03494)                                         Therapeutic Exercise and NMR EXR  [x] (34793) Provided verbal/tactile cueing for activities related to strengthening, flexibility, endurance, ROM for improvements in LE, proximal hip, and core control with self care, mobility, lifting, ambulation. [x] (80592) Provided verbal/tactile cueing for activities related to improving balance, coordination, kinesthetic sense, posture, motor skill, proprioception  to assist with LE, proximal hip, and core control in self care, mobility, lifting, ambulation and eccentric single leg control.      NMR and Therapeutic Activities:    [] (61817 or 44863) Provided verbal/tactile cueing for activities related to improving balance, coordination, kinesthetic sense, posture, motor skill, proprioception and motor activation to allow for proper function of core, proximal hip and LE with self care and ADLs  [] (26096) Gait Re-education- Provided training and instruction to the patient for proper LE, core and proximal hip recruitment and positioning and eccentric body weight control with ambulation re-education including up and down stairs     Home Exercise Program:    [x] (29084) Reviewed/Progressed HEP activities related to strengthening, flexibility, endurance, ROM of core, proximal hip and LE for functional self-care, mobility, lifting and ambulation/stair navigation   [] (89264)Reviewed/Progressed HEP activities related to improving balance, coordination, kinesthetic sense, posture, motor skill, proprioception of core, proximal hip and LE for self care, mobility, lifting, and ambulation/stair navigation      Manual Treatments:  PROM / STM / Oscillations-Mobs:  G-I, II, III, IV (PA's, Inf., Post.)  [x] (96445) Provided manual therapy to mobilize LE, proximal hip and/or LS spine soft tissue/joints for the purpose of modulating pain, promoting relaxation,  increasing ROM, reducing/eliminating soft tissue swelling/inflammation/restriction, improving soft tissue extensibility and allowing for proper ROM for normal function with self care, mobility, lifting and ambulation. Modalities:     [] GAME READY (VASO)- for significant edema, swelling, pain control. Charges:  Timed Code Treatment Minutes: 45    Total Treatment Minutes: 45       [] EVAL (LOW) 70267   [] EVAL (MOD) 85009  [] EVAL (HIGH) 00664   [] RE-EVAL     [x] PU(70745) x2     [] IONTO  [] NMR (73222) x     [] VASO  [x] Manual (24791) x  1    [] Other:  [] TA x      [] Mech Traction (62975)  [] ES(attended) (51308)      [] ES (un) (27468):       GOALS:   Patient stated goal: walking without pain  [] Progressing: [] Met: [] Not Met: [] Adjusted    Therapist goals for Patient:   Short Term Goals: To be achieved in: 2 weeks  1. Independent in HEP and progression per patient tolerance, in order to prevent re-injury. [] Progressing: [] Met: [] Not Met: [] Adjusted  2.  Patient will have a decrease in pain to facilitate improvement in movement, function, and ADLs as indicated by Functional Deficits. [] Progressing: [] Met: [] Not Met: [] Adjusted    Long Term Goals: To be achieved in: 5 weeks  1. Disability index score of 10% or less for the LEFS to assist with reaching prior level of function. [] Progressing: [] Met: [] Not Met: [] Adjusted  2. Patient will demonstrate normalized hindfoot and forefoot joint mobility symmetrical to uninvolved limb to allow for proper joint functioning as indicated by patients Functional Deficits. [] Progressing: [] Met: [] Not Met: [] Adjusted  3. Patient will demonstrate an increase in Strength to good proximal hip strength and control, within 5lb HHD in LE to allow for proper functional mobility as indicated by patients Functional Deficits. [] Progressing: [] Met: [] Not Met: [] Adjusted  4. Patient will be able to sleep at least 6 hours undisturbed by foot pain. [] Progressing: [] Met: [] Not Met: [] Adjusted  5. Pt will be able to walk with a normalized gait pattern without increased symptoms for at least 1 hour. [] Progressing: [] Met: [] Not Met: [] Adjusted     Progression Towards Functional goals:  [] Patient is progressing as expected towards functional goals listed. [] Progression is slowed due to complexities listed. [] Progression has been slowed due to co-morbidities. [x] Plan just implemented, too soon to assess goals progression  [] Other:         Overall Progression Towards Functional goals/ Treatment Progress Update:  [] Patient is progressing as expected towards functional goals listed. [] Progression is slowed due to complexities/Impairments listed. [] Progression has been slowed due to co-morbidities.   [x] Plan just implemented, too soon to assess goals progression <30days   [] Goals require adjustment due to lack of progress  [] Patient is not progressing as expected and requires additional follow up with physician  [] Other    Prognosis for POC: [x] Good [] Fair  [] Poor      Patient requires continued skilled intervention: [x] Yes  [] No    Treatment/Activity Tolerance:  [x] Patient able to complete treatment  [] Patient limited by fatigue  [] Patient limited by pain    [] Patient limited by other medical complications  [] Other:     ASSESSMENT: Pt had much less pain after the STM but still has soreness in heel in WB on a firm surface. PLAN: See eval. Pt to be seen 2x wk for 2 weeks then 1x wk for 3 weeks. Next visit, cont jt mobs, STM to post tib, progress to green [facility] for ankle, Reformer heel raises  [x] Continue per plan of care [] Alter current plan (see comments above)  [] Plan of care initiated [] Hold pending MD visit [] Discharge      Electronically signed by:  Maudine Severance, PT    Note: If patient does not return for scheduled/ recommended follow up visits, this note will serve as a discharge from care along with most recent update on progress.

## 2020-10-07 NOTE — TELEPHONE ENCOUNTER
Spoke to the patient about her labs. Triglycerides and cholesterol are a little bit up. Blood sugar is 115, prediabetic range. Discussed diabetes and numbers and strategies for management. We will want to follow numbers in 4-6 months.

## 2020-10-07 NOTE — TELEPHONE ENCOUNTER
Pt called again about this medication that needs to be sent to the pharm so that they can use it on her at PT. See other note. She has an appt today. Pt said you know what she needs.

## 2020-10-07 NOTE — TELEPHONE ENCOUNTER
Pt called saying her yeast infection symptoms are much better but she still has some lingering symptoms. Can a refill be sent to the [facility] on file? Please advise.

## 2020-10-12 ENCOUNTER — HOSPITAL ENCOUNTER (OUTPATIENT)
Dept: PHYSICAL THERAPY | Age: 70
Setting detail: THERAPIES SERIES
Discharge: HOME | End: 2020-10-12
Payer: MEDICARE

## 2020-10-12 PROCEDURE — 97033 APP MDLTY 1+IONTPHRSIS EA 15: CPT

## 2020-10-12 PROCEDURE — 97110 THERAPEUTIC EXERCISES: CPT

## 2020-10-12 PROCEDURE — 97140 MANUAL THERAPY 1/> REGIONS: CPT

## 2020-10-12 NOTE — FLOWSHEET NOTE
[facility] Orthopaedics and Sports Rehabilitation; [facility]         Physical Therapy Treatment Note/ Progress Report:           Date:  10/12/2020    Patient Name:  Bernice Wills    :  1950  MRN: [id number]  Restrictions/Precautions:    Medical/Treatment Diagnosis Information:  · Diagnosis: M72.2 R plantar fascitis  · Treatment Diagnosis: M72.2 R plantar fascitis, R26.2 difficulty walking, M25.674 R foot stiffness  Insurance/Certification information:  PT Insurance Information: [employer], [employer] [facility], needs auth  Physician Information:  Referring Practitioner: Dr. Simi Ruiz  Has the plan of care been signed (Y/N):        []  Yes  [x]  No     Date of Patient follow up with Physician: as needed      Is this a Progress Report:     []  Yes  [x]  No        If Yes:  Date Range for reporting period:  Beginning 10/2/202  Ending    Progress report will be due (10 Rx or 30 days whichever is less):       Recertification will be due (POC Duration  / 90 days whichever is less): 2020      Visit # Insurance Allowable Auth Required   In-person 4 Med nec [x]  Yes []  No    Telehealth - ? []  Yes []  No    Total 4         Functional Scale: 29/80 = 36% ability, 64% disability   Date assessed:  10/2/2020          Number of Comorbidities:  []0     [x]1-2    []3+    Latex Allergy:  [x]NO      []YES  Preferred Language for Healthcare:   [x]English       []other:      Pain level:  3/10     SUBJECTIVE:  Pt says it hurts when she does HEP and when she doesn't. She admits to not being as good about wearing her tennis shoes lately but does not seem to associate this as a reason for recent inc pain but I do point this out.        OBJECTIVE:    Observation: mild tender and crepitus in R plantar fascia and post tib   Test measurements:  Gastroc 10 deg, Soleus 15 deg    RESTRICTIONS/PRECAUTIONS: none    Exercises/Interventions:     Therapeutic Ex (44162) Sets/Reps/ sec Notes/CUES   Toe curls HEp   Toes extension HEP   Foot doming w/ lift off HEP   FHL wall stretch 2x30\" HEP   Tband PF, inv, ev 1x15ea green HEP 10/5   Towel scrunch     Ecc Heel raise on incline 2x10 DL HEP 10/5   Calf towel stretch 2x30\" R/L                   Pt edu 3 min Again, proper footwear rather than always wearing [place]. Manual Intervention (99795)     Hindfoot & midfoot jt mobilization     STM: planar fascia , FHL, gastroc, soleus 25 min                        NMR re-education (10748)  CUES NEEDED   SLB airex 2x30\"   SLB turn head    Tandem on airex, turn head                                  Therapeutic Activity (03154)                         Modalities     Ionto: 14 hr wear patch, 1.3mL dex 8 min placement and edu R meidal inferior heel         Therapeutic Exercise and NMR EXR  [x] (35227) Provided verbal/tactile cueing for activities related to strengthening, flexibility, endurance, ROM for improvements in LE, proximal hip, and core control with self care, mobility, lifting, ambulation.  [] (60224) Provided verbal/tactile cueing for activities related to improving balance, coordination, kinesthetic sense, posture, motor skill, proprioception  to assist with LE, proximal hip, and core control in self care, mobility, lifting, ambulation and eccentric single leg control.      NMR and Therapeutic Activities:    [] (04373 or 98272) Provided verbal/tactile cueing for activities related to improving balance, coordination, kinesthetic sense, posture, motor skill, proprioception and motor activation to allow for proper function of core, proximal hip and LE with self care and ADLs  [] (45510) Gait Re-education- Provided training and instruction to the patient for proper LE, core and proximal hip recruitment and positioning and eccentric body weight control with ambulation re-education including up and down stairs     Home Exercise Program:    [x] (75447) Reviewed/Progressed HEP activities related to strengthening, flexibility, endurance, ROM of core, proximal hip and LE for functional self-care, mobility, lifting and ambulation/stair navigation   [] (79020)Reviewed/Progressed HEP activities related to improving balance, coordination, kinesthetic sense, posture, motor skill, proprioception of core, proximal hip and LE for self care, mobility, lifting, and ambulation/stair navigation      Manual Treatments:  PROM / STM / Oscillations-Mobs:  G-I, II, III, IV (PA's, Inf., Post.)  [x] (67782) Provided manual therapy to mobilize LE, proximal hip and/or LS spine soft tissue/joints for the purpose of modulating pain, promoting relaxation,  increasing ROM, reducing/eliminating soft tissue swelling/inflammation/restriction, improving soft tissue extensibility and allowing for proper ROM for normal function with self care, mobility, lifting and ambulation. Modalities:     [] GAME READY (VASO)- for significant edema, swelling, pain control. Iontophoresis: to address localized inflammation and pain. Pt to wear ionto home patch    Charges:  Timed Code Treatment Minutes: 56    Total Treatment Minutes: 56       [] EVAL (LOW) 40305   [] EVAL (MOD) 55485  [] EVAL (HIGH) 80254   [] RE-EVAL      [x] WR(15054) x 1     [x] IONTO  [] NMR (79188) x     [] VASO  [x] Manual (02401) x  2    [] Other:  [] TA x      [] Mech Traction (06651)  [] ES(attended) (35048)      [] ES (un) (28112):       GOALS:   Patient stated goal: walking without pain  [] Progressing: [] Met: [] Not Met: [] Adjusted    Therapist goals for Patient:   Short Term Goals: To be achieved in: 2 weeks  1. Independent in HEP and progression per patient tolerance, in order to prevent re-injury. [] Progressing: [] Met: [] Not Met: [] Adjusted  2. Patient will have a decrease in pain to facilitate improvement in movement, function, and ADLs as indicated by Functional Deficits. [] Progressing: [] Met: [] Not Met: [] Adjusted    Long Term Goals: To be achieved in: 5 weeks  1.  Disability index limited by pain    [] Patient limited by other medical complications  [] Other:     ASSESSMENT: Again had length discussion about the importance of footwear and helping her link that when she was feeling better last week was when she started wearing her tennis shoes more and her inc pain has returned as she spent more time in [place] and barefoot over the weekend. After this discussion pt seemed to have a better understanding and hopefully will have better compliance with proper footwear even around the house. Pt continues to have crepitus and tenderness at plantar fascia and distal FHL. PLAN: See eval. Pt to be seen 2x wk for 2 weeks then 1x wk for 3 weeks. Next visit: cont ionto patch, Reformer heel raises  [x] Continue per plan of care [] Alter current plan (see comments above)  [] Plan of care initiated [] Hold pending MD visit [] Discharge      Electronically signed by:  Duong Taylor PT    Note: If patient does not return for scheduled/ recommended follow up visits, this note will serve as a discharge from care along with most recent update on progress.

## 2020-10-16 ENCOUNTER — HOSPITAL ENCOUNTER (OUTPATIENT)
Dept: PHYSICAL THERAPY | Age: 70
Setting detail: THERAPIES SERIES
Discharge: HOME | End: 2020-10-16
Payer: MEDICARE

## 2020-10-16 PROCEDURE — 97140 MANUAL THERAPY 1/> REGIONS: CPT

## 2020-10-16 PROCEDURE — 97110 THERAPEUTIC EXERCISES: CPT

## 2020-10-16 PROCEDURE — 97033 APP MDLTY 1+IONTPHRSIS EA 15: CPT

## 2020-10-16 NOTE — FLOWSHEET NOTE
[facility] Orthopaedics and Sports Rehabilitation; [facility]         Physical Therapy Treatment Note/ Progress Report:           Date:  10/16/2020    Patient Name:  January Chavez    :  1950  MRN: [id number]  Restrictions/Precautions:    Medical/Treatment Diagnosis Information:  · Diagnosis: M72.2 R plantar fascitis  · Treatment Diagnosis: M72.2 R plantar fascitis, R26.2 difficulty walking, M25.674 R foot stiffness  Insurance/Certification information:  PT Insurance Information: [employer] [facility], needs auth  Physician Information:  Referring Practitioner: Dr. Jarrod Edwards  Has the plan of care been signed (Y/N):        []  Yes  [x]  No     Date of Patient follow up with Physician: as needed      Is this a Progress Report:     []  Yes  [x]  No        If Yes:  Date Range for reporting period:  Beginning 10/2/202  Ending    Progress report will be due (10 Rx or 30 days whichever is less):       Recertification will be due (POC Duration  / 90 days whichever is less): 2020      Visit # Insurance Allowable Auth Required   In-person 5 Med nec [x]  Yes []  No    Telehealth - ? []  Yes []  No    Total 5         Functional Scale:  = 36% ability, 64% disability   Date assessed:  10/2/2020          Number of Comorbidities:  []0     [x]1-2    []3+    Latex Allergy:  [x]NO      []YES  Preferred Language for Healthcare:   [x]English       []other:      Pain level:  0/10 at heel & original source of pain, now 4/10 at ankle joint and Achilles when walking    SUBJECTIVE:  Pt says it hurts when she does HEP and when she doesn't. She admits to not being as good about wearing her tennis shoes lately but does not seem to associate this as a reason for recent inc pain but I do point this out.        OBJECTIVE:    Observation: mild tender and crepitus in R plantar fascia and post tib   Test measurements:  Gastroc 10 deg, Soleus 15 deg    RESTRICTIONS/PRECAUTIONS: none    Exercises/Interventions:     Therapeutic Ex (66819) Sets/Reps/ sec Notes/CUES   Toe curls HEp   Toes extension HEP   Foot doming w/ lift off HEP   FHL wall stretch HEP   Tband PF, inv, ev HEP 10/5   Towel scrunch     Ecc Heel raise on incline  HEP 10/5   Calf towel stretch     Reformer: DL heel raise on bar 2R 2x15    Ref: SL heel raise on bar 1R 2x10 R    Ref: heels on bar DF/PF 1R 2x10     BAPS: DF/PF, inv/ev, CW/CCW  NWB 1x10ea         Pt edu  Again, proper footwear rather than always wearing [place]. Manual Intervention (69414)     Hindfoot & midfoot jt mobilization gd 2-3, 5 min    STM: planar fascia , FHL, gastroc, soleus 18 min                        NMR re-education (80163)  CUES NEEDED   SLB airex 2x30\"   SLB turn head    Tandem on airex, turn head                                  Therapeutic Activity (20986)                         Modalities     Ionto: 14 hr wear patch, 1.3mL dex 8 min placement and edu R meidal inferior heel         Therapeutic Exercise and NMR EXR  [x] (07903) Provided verbal/tactile cueing for activities related to strengthening, flexibility, endurance, ROM for improvements in LE, proximal hip, and core control with self care, mobility, lifting, ambulation.  [] (56596) Provided verbal/tactile cueing for activities related to improving balance, coordination, kinesthetic sense, posture, motor skill, proprioception  to assist with LE, proximal hip, and core control in self care, mobility, lifting, ambulation and eccentric single leg control.      NMR and Therapeutic Activities:    [] (61620 or 06001) Provided verbal/tactile cueing for activities related to improving balance, coordination, kinesthetic sense, posture, motor skill, proprioception and motor activation to allow for proper function of core, proximal hip and LE with self care and ADLs  [] (02605) Gait Re-education- Provided training and instruction to the patient for proper LE, core and proximal hip recruitment and positioning and eccentric body weight control with ambulation re-education including up and down stairs     Home Exercise Program:    [x] (11825) Reviewed/Progressed HEP activities related to strengthening, flexibility, endurance, ROM of core, proximal hip and LE for functional self-care, mobility, lifting and ambulation/stair navigation   [] (46037)Reviewed/Progressed HEP activities related to improving balance, coordination, kinesthetic sense, posture, motor skill, proprioception of core, proximal hip and LE for self care, mobility, lifting, and ambulation/stair navigation      Manual Treatments:  PROM / STM / Oscillations-Mobs:  G-I, II, III, IV (PA's, Inf., Post.)  [x] (73530) Provided manual therapy to mobilize LE, proximal hip and/or LS spine soft tissue/joints for the purpose of modulating pain, promoting relaxation,  increasing ROM, reducing/eliminating soft tissue swelling/inflammation/restriction, improving soft tissue extensibility and allowing for proper ROM for normal function with self care, mobility, lifting and ambulation. Modalities:     [] GAME READY (VASO)- for significant edema, swelling, pain control. Iontophoresis: to address localized inflammation and pain. Pt to wear ionto home patch    Charges:  Timed Code Treatment Minutes: 53   Total Treatment Minutes: 53       [] EVAL (LOW) 66557   [] EVAL (MOD) 78526  [] EVAL (HIGH) 77869   [] RE-EVAL      [x] GM(83336) x 1     [x] IONTO  [] NMR (29275) x     [] VASO  [x] Manual (19330) x  2    [] Other:  [] TA x      [] Mech Traction (89335)  [] ES(attended) (75449)      [] ES (un) (79882):       GOALS:   Patient stated goal: walking without pain  [] Progressing: [] Met: [] Not Met: [] Adjusted    Therapist goals for Patient:   Short Term Goals: To be achieved in: 2 weeks  1. Independent in HEP and progression per patient tolerance, in order to prevent re-injury. [] Progressing: [] Met: [] Not Met: [] Adjusted  2.  Patient will have a decrease in pain to facilitate improvement in movement, function, and ADLs as indicated by Functional Deficits. [] Progressing: [] Met: [] Not Met: [] Adjusted    Long Term Goals: To be achieved in: 5 weeks  1. Disability index score of 10% or less for the LEFS to assist with reaching prior level of function. [] Progressing: [] Met: [] Not Met: [] Adjusted  2. Patient will demonstrate normalized hindfoot and forefoot joint mobility symmetrical to uninvolved limb to allow for proper joint functioning as indicated by patients Functional Deficits. [] Progressing: [] Met: [] Not Met: [] Adjusted  3. Patient will demonstrate an increase in Strength to good proximal hip strength and control, within 5lb HHD in LE to allow for proper functional mobility as indicated by patients Functional Deficits. [] Progressing: [] Met: [] Not Met: [] Adjusted  4. Patient will be able to sleep at least 6 hours undisturbed by foot pain. [] Progressing: [] Met: [] Not Met: [] Adjusted  5. Pt will be able to walk with a normalized gait pattern without increased symptoms for at least 1 hour. [] Progressing: [] Met: [] Not Met: [] Adjusted     Progression Towards Functional goals:  [] Patient is progressing as expected towards functional goals listed. [] Progression is slowed due to complexities listed. [] Progression has been slowed due to co-morbidities. [x] Plan just implemented, too soon to assess goals progression  [] Other:         Overall Progression Towards Functional goals/ Treatment Progress Update:  [] Patient is progressing as expected towards functional goals listed. [] Progression is slowed due to complexities/Impairments listed. [] Progression has been slowed due to co-morbidities.   [x] Plan just implemented, too soon to assess goals progression <30days   [] Goals require adjustment due to lack of progress  [] Patient is not progressing as expected and requires additional follow up with physician  [] Other    Prognosis for POC: [x] Good [] Fair  [] Poor      Patient requires continued skilled intervention: [x] Yes  [] No    Treatment/Activity Tolerance:  [x] Patient able to complete treatment  [] Patient limited by fatigue  [] Patient limited by pain    [] Patient limited by other medical complications  [] Other:     ASSESSMENT: After joint mobilization and stm pt's newer onset of anterior ankle pain was resolved with walking. She felt great difficulty at R PWB single leg strengthen today therefore we need to continue to address her R LE strength deficits. Pt is noticing improvement in symptoms following 24 hours after each visit therefore we have decided to continue at the twice a week frequency rather than reducing to once as previously planned. This frequency is needed as well to achieve therapeutically effective ionto use as well. PLAN: See eval. Pt to be seen 2x wk for 3 more weeks . Next visit: cont ionto patch, Reformer heel raises. [] Continue per plan of care [x] Alter current plan (see comments above)  [] Plan of care initiated [] Hold pending MD visit [] Discharge      Electronically signed by:  Barb Omalley PT    Note: If patient does not return for scheduled/ recommended follow up visits, this note will serve as a discharge from care along with most recent update on progress.

## 2020-10-19 ENCOUNTER — TELEPHONE (OUTPATIENT)
Dept: FAMILY MEDICINE CLINIC | Age: 70
End: 2020-10-19

## 2020-10-19 RX ORDER — KETOCONAZOLE 20 MG/G
CREAM TOPICAL
Qty: 30 G | Refills: 1 | Status: SHIPPED | OUTPATIENT
Start: 2020-10-19 | End: 2021-03-10 | Stop reason: ALTCHOICE

## 2020-10-19 NOTE — TELEPHONE ENCOUNTER
Pt states she has been getting treated for yeast infection under both breast especially left one  it went away for 4 days now its back . Pt uses [facility] on [address] .  Pt would like call back phani et her know if something is called in

## 2020-10-21 ENCOUNTER — HOSPITAL ENCOUNTER (OUTPATIENT)
Dept: PHYSICAL THERAPY | Age: 70
Setting detail: THERAPIES SERIES
Discharge: HOME | End: 2020-10-21
Payer: MEDICARE

## 2020-10-21 PROCEDURE — 97140 MANUAL THERAPY 1/> REGIONS: CPT

## 2020-10-21 PROCEDURE — 97033 APP MDLTY 1+IONTPHRSIS EA 15: CPT

## 2020-10-21 PROCEDURE — 97110 THERAPEUTIC EXERCISES: CPT

## 2020-10-21 NOTE — FLOWSHEET NOTE
[facility] Orthopaedics and Sports Rehabilitation; [facility]         Physical Therapy Treatment Note/ Progress Report:           Date:  10/21/2020    Patient Name:  Prince Marshall    :  1950  MRN: [id number]  Restrictions/Precautions:    Medical/Treatment Diagnosis Information:  · Diagnosis: M72.2 R plantar fascitis  · Treatment Diagnosis: M72.2 R plantar fascitis, R26.2 difficulty walking, M25.674 R foot stiffness  Insurance/Certification information:  PT Insurance Information: [employer], shabana Dey, needs auth  Physician Information:  Referring Practitioner: Dr. Luke Mejía  Has the plan of care been signed (Y/N):        []  Yes  [x]  No     Date of Patient follow up with Physician: as needed      Is this a Progress Report:     []  Yes  [x]  No        If Yes:  Date Range for reporting period:  Beginning 10/2/202  Ending    Progress report will be due (10 Rx or 30 days whichever is less):       Recertification will be due (POC Duration  / 90 days whichever is less): 2020      Visit # Insurance Allowable Auth Required   In-person 6 Shabana Dey [x]  Yes []  No    Telehealth - ? []  Yes []  No    Total 6         Functional Scale: 2980 = 36% ability, 64% disability   Date assessed:  10/2/2020          Number of Comorbidities:  []0     [x]1-2    []3+    Latex Allergy:  [x]NO      []YES  Preferred Language for Healthcare:   [x]English       []other:      Pain level:  0/10 at heel & original source of pain, now 4/10 at ankle joint and Achilles when walking still present    SUBJECTIVE:  Pt says her original pain source in her plantar fascia and under her heel is mostly revolved but she continues to now have more pain and irritate at the top of her foot and anteriorr ankle that is bothering her more now.  When asked if now wereing tennis shoes could be irritation from her tennis shoe laces she said she has tried different lacing or not wearing her shoes and it still bothers her. OBJECTIVE:    Observation: mild tender and crepitus in R plantar fascia    Test measurements:  Gastroc 10 deg, Soleus 15 deg    RESTRICTIONS/PRECAUTIONS: none    Exercises/Interventions:     Therapeutic Ex (04531) Sets/Reps/ sec Notes/CUES   Toe curls 1x15 HEp   Toes extension 1x15 HEP   Foot doming  2x15 HEP   FHL wall stretch verbal reviewHEP   Tband PF, inv, ev  verbal reviewHEP 10/5   Towel scrunch     Ecc Heel raise on incline  HEP 10/5   Calf towel stretch  verbal review    Reformer: DL heel raise on bar    Ref: SL heel raise on bar    Ref: heels on bar DF/PF    BAPS: DF/PF, inv/ev, CW/CCW    Heel raise DL 1x10    Pt edu  Again, proper footwear rather than always wearing Crocks. Manual Intervention (17284)     Hindfoot & midfoot jt mobilization  D/C due to new midfoot jt pain   STM: planar fascia , FHL, gastroc, soleus 18 min                        NMR re-education (19908)  CUES NEEDED   SLB airex    SLB turn head    Tandem on airex, turn head                                  Therapeutic Activity (66952)                         Modalities     Ionto: 14 hr wear patch, 1.3mL dex 8 min placement and edu R meidal inferior heel up around midfoot joint         Therapeutic Exercise and NMR EXR  [x] (08529) Provided verbal/tactile cueing for activities related to strengthening, flexibility, endurance, ROM for improvements in LE, proximal hip, and core control with self care, mobility, lifting, ambulation.  [] (26259) Provided verbal/tactile cueing for activities related to improving balance, coordination, kinesthetic sense, posture, motor skill, proprioception  to assist with LE, proximal hip, and core control in self care, mobility, lifting, ambulation and eccentric single leg control.      NMR and Therapeutic Activities:    [] (44448 or 13818) Provided verbal/tactile cueing for activities related to improving balance, coordination, kinesthetic sense, posture, motor skill, proprioception and motor activation to allow for proper function of core, proximal hip and LE with self care and ADLs  [] (22877) Gait Re-education- Provided training and instruction to the patient for proper LE, core and proximal hip recruitment and positioning and eccentric body weight control with ambulation re-education including up and down stairs     Home Exercise Program:    [x] (56822) Reviewed/Progressed HEP activities related to strengthening, flexibility, endurance, ROM of core, proximal hip and LE for functional self-care, mobility, lifting and ambulation/stair navigation   [] (77851)Reviewed/Progressed HEP activities related to improving balance, coordination, kinesthetic sense, posture, motor skill, proprioception of core, proximal hip and LE for self care, mobility, lifting, and ambulation/stair navigation      Manual Treatments:  PROM / STM / Oscillations-Mobs:  G-I, II, III, IV (PA's, Inf., Post.)  [x] (09927) Provided manual therapy to mobilize LE, proximal hip and/or LS spine soft tissue/joints for the purpose of modulating pain, promoting relaxation,  increasing ROM, reducing/eliminating soft tissue swelling/inflammation/restriction, improving soft tissue extensibility and allowing for proper ROM for normal function with self care, mobility, lifting and ambulation. Modalities:     [] GAME READY (VASO)- for significant edema, swelling, pain control. Iontophoresis: to address localized inflammation and pain.  Pt to wear ionto home patch    Charges:  Timed Code Treatment Minutes: 40   Total Treatment Minutes: 40       [] EVAL (LOW) 66521   [] EVAL (MOD) 46821  [] EVAL (HIGH) 79130   [] RE-EVAL      [x] YJ(04054) x 1     [x] IONTO  [] NMR (58687) x     [] VASO  [x] Manual (74545) x  1    [] Other:  [] TA x      [] Mech Traction (44348)  [] ES(attended) (78618)      [] ES (un) (33977):       GOALS:   Patient stated goal: walking without pain  [] Progressing: [] Met: [] Not Met: [] Adjusted    Therapist goals for Patient:   Short Term Goals: To be achieved in: 2 weeks  1. Independent in HEP and progression per patient tolerance, in order to prevent re-injury. [] Progressing: [] Met: [] Not Met: [] Adjusted  2. Patient will have a decrease in pain to facilitate improvement in movement, function, and ADLs as indicated by Functional Deficits. [] Progressing: [] Met: [] Not Met: [] Adjusted    Long Term Goals: To be achieved in: 5 weeks  1. Disability index score of 10% or less for the LEFS to assist with reaching prior level of function. [] Progressing: [] Met: [] Not Met: [] Adjusted  2. Patient will demonstrate normalized hindfoot and forefoot joint mobility symmetrical to uninvolved limb to allow for proper joint functioning as indicated by patients Functional Deficits. [] Progressing: [] Met: [] Not Met: [] Adjusted  3. Patient will demonstrate an increase in Strength to good proximal hip strength and control, within 5lb HHD in LE to allow for proper functional mobility as indicated by patients Functional Deficits. [] Progressing: [] Met: [] Not Met: [] Adjusted  4. Patient will be able to sleep at least 6 hours undisturbed by foot pain. [] Progressing: [] Met: [] Not Met: [] Adjusted  5. Pt will be able to walk with a normalized gait pattern without increased symptoms for at least 1 hour. [] Progressing: [] Met: [] Not Met: [] Adjusted     Progression Towards Functional goals:  [] Patient is progressing as expected towards functional goals listed. [] Progression is slowed due to complexities listed. [] Progression has been slowed due to co-morbidities. [x] Plan just implemented, too soon to assess goals progression  [] Other:         Overall Progression Towards Functional goals/ Treatment Progress Update:  [] Patient is progressing as expected towards functional goals listed. [] Progression is slowed due to complexities/Impairments listed. [] Progression has been slowed due to co-morbidities.   [x] Plan

## 2020-10-23 ENCOUNTER — HOSPITAL ENCOUNTER (OUTPATIENT)
Dept: PHYSICAL THERAPY | Age: 70
Setting detail: THERAPIES SERIES
Discharge: HOME | End: 2020-10-23
Payer: MEDICARE

## 2020-10-23 PROCEDURE — 97110 THERAPEUTIC EXERCISES: CPT

## 2020-10-23 PROCEDURE — 97033 APP MDLTY 1+IONTPHRSIS EA 15: CPT

## 2020-10-23 PROCEDURE — 97140 MANUAL THERAPY 1/> REGIONS: CPT

## 2020-10-23 NOTE — FLOWSHEET NOTE
[facility] Orthopaedics and Sports Rehabilitation; [place]         Physical Therapy Treatment Note/ Progress Report:           Date:  10/23/2020    Patient Name:  Prince Marshall    :  1950  MRN: [id number]  Restrictions/Precautions:    Medical/Treatment Diagnosis Information:  · Diagnosis: M72.2 R plantar fascitis  · Treatment Diagnosis: M72.2 R plantar fascitis, R26.2 difficulty walking, M25.674 R foot stiffness  Insurance/Certification information:  PT Insurance Information: [employer], [employer] [facility], needs auth  Physician Information:  Referring Practitioner: Dr. Luke Mejía  Has the plan of care been signed (Y/N):        []  Yes  [x]  No     Date of Patient follow up with Physician: as needed      Is this a Progress Report:     []  Yes  [x]  No        If Yes:  Date Range for reporting period:  Beginning 10/2/202  Ending    Progress report will be due (10 Rx or 30 days whichever is less):       Recertification will be due (POC Duration  / 90 days whichever is less): 2020      Visit # Insurance Allowable Auth Required   In-person 7 Med nec [x]  Yes []  No    Telehealth - ? []  Yes []  No    Total 7         Functional Scale: 2980 = 36% ability, 64% disability   Date assessed:  10/2/2020          Number of Comorbidities:  []0     [x]1-2    []3+    Latex Allergy:  [x]NO      []YES  Preferred Language for Healthcare:   [x]English       []other:      Pain level:  0/10 at heel & original source of pain, now 1-2/10 at ankle joint and Achilles when walking still present    SUBJECTIVE:  Pt report the original source of plantar heel is still doing better and also noticed improvement with less pian in the dorsal foot and ankle and in the midfoot joint region. Going up strairs leading with the R foot is now the nearly only remaining functional deficit.      OBJECTIVE:    Observation: very mild tender and crepitus in R plantar fascia    Test measurements:  Gastroc 10 deg, Soleus 15 deg    RESTRICTIONS/PRECAUTIONS: none    Exercises/Interventions:     Therapeutic Ex (57398) Sets/Reps/ sec Notes/CUES   Toe curls HEP   Toes extension HEP   Foot doming  HEP   FHL wall stretch [id number] reviewHEP   Gastroc/Soleus stretch 1x30\"ea R/L   Tband PF, inv, ev  verbal reviewHEP 10/5   Towel scrunch     Ecc Heel raise on incline  HEP 10/5   Calf towel stretch  verbal review    Reformer: DL heel raise on bar    Ref: SL heel raise on bar    Ref: heels on bar DF/PF    BAPS: DF/PF, inv/ev, CW/CCW  NWB 2x10ea   Heel raise     Pt edu  Again, proper footwear rather than always wearing Crocks. Manual Intervention (84691)     Hindfoot & midfoot jt mobilization  D/C due to new midfoot jt pain   STM: planar fascia , FHL, gastroc, soleus 15 min                        NMR re-education (80021)  CUES NEEDED   SLB airex    SLB turn head    Tandem on airex, turn head    [place] 2x10 ea R/L                             Therapeutic Activity (25390)                         Modalities     Ionto: 14 hr wear patch, 1.3mL dex 8 min placement and edu R meidal inferior heel up around midfoot joint         Therapeutic Exercise and NMR EXR  [x] (47932) Provided verbal/tactile cueing for activities related to strengthening, flexibility, endurance, ROM for improvements in LE, proximal hip, and core control with self care, mobility, lifting, ambulation.  [] (20303) Provided verbal/tactile cueing for activities related to improving balance, coordination, kinesthetic sense, posture, motor skill, proprioception  to assist with LE, proximal hip, and core control in self care, mobility, lifting, ambulation and eccentric single leg control.      NMR and Therapeutic Activities:    [] (80035 or 78028) Provided verbal/tactile cueing for activities related to improving balance, coordination, kinesthetic sense, posture, motor skill, proprioception and motor activation to allow for proper function of core, proximal hip and LE with self care and ADLs  [] (65324) Gait Re-education- Provided training and instruction to the patient for proper LE, core and proximal hip recruitment and positioning and eccentric body weight control with ambulation re-education including up and down stairs     Home Exercise Program:    [] (90820) Reviewed/Progressed HEP activities related to strengthening, flexibility, endurance, ROM of core, proximal hip and LE for functional self-care, mobility, lifting and ambulation/stair navigation   [] (22275)Reviewed/Progressed HEP activities related to improving balance, coordination, kinesthetic sense, posture, motor skill, proprioception of core, proximal hip and LE for self care, mobility, lifting, and ambulation/stair navigation      Manual Treatments:  PROM / STM / Oscillations-Mobs:  G-I, II, III, IV (PA's, Inf., Post.)  [x] (85794) Provided manual therapy to mobilize LE, proximal hip and/or LS spine soft tissue/joints for the purpose of modulating pain, promoting relaxation,  increasing ROM, reducing/eliminating soft tissue swelling/inflammation/restriction, improving soft tissue extensibility and allowing for proper ROM for normal function with self care, mobility, lifting and ambulation. Modalities:     [] GAME READY (VASO)- for significant edema, swelling, pain control. Iontophoresis: to address localized inflammation and pain. Pt to wear ionto home patch    Charges:  Timed Code Treatment Minutes: 42   Total Treatment Minutes: 42       [] EVAL (LOW) 43468   [] EVAL (MOD) 12694  [] EVAL (HIGH) 53697   [] RE-EVAL      [x] TM(77554) x 1     [x] IONTO  [] NMR (68790) x     [] VASO  [x] Manual (73483) x  1    [] Other:  [] TA x      [] Mech Traction (13689)  [] ES(attended) (83837)      [] ES (un) (28350):       GOALS:   Patient stated goal: walking without pain  [] Progressing: [] Met: [] Not Met: [] Adjusted    Therapist goals for Patient:   Short Term Goals: To be achieved in: 2 weeks  1.  Independent in HEP and progression per patient tolerance, in order to prevent re-injury. [] Progressing: [x] Met: [] Not Met: [] Adjusted  2. Patient will have a decrease in pain to facilitate improvement in movement, function, and ADLs as indicated by Functional Deficits. [] Progressing: [x] Met: [] Not Met: [] Adjusted    Long Term Goals: To be achieved in: 5 weeks  1. Disability index score of 10% or less for the LEFS to assist with reaching prior level of function. [] Progressing: [] Met: [] Not Met: [] Adjusted  2. Patient will demonstrate normalized hindfoot and forefoot joint mobility symmetrical to uninvolved limb to allow for proper joint functioning as indicated by patients Functional Deficits. [x] Progressing: [] Met: [] Not Met: [] Adjusted  3. Patient will demonstrate an increase in Strength to good proximal hip strength and control, within 5lb HHD in LE to allow for proper functional mobility as indicated by patients Functional Deficits. [x] Progressing: [] Met: [] Not Met: [] Adjusted  4. Patient will be able to sleep at least 6 hours undisturbed by foot pain. [] Progressing: [x] Met: [] Not Met: [] Adjusted  5. Pt will be able to walk with a normalized gait pattern without increased symptoms for at least 1 hour. [x] Progressing: [] Met: [] Not Met: [] Adjusted     Progression Towards Functional goals:  [x] Patient is progressing as expected towards functional goals listed. [] Progression is slowed due to complexities listed. [] Progression has been slowed due to co-morbidities. [] Plan just implemented, too soon to assess goals progression  [] Other:         Overall Progression Towards Functional goals/ Treatment Progress Update:  [x] Patient is progressing as expected towards functional goals listed. [] Progression is slowed due to complexities/Impairments listed. [] Progression has been slowed due to co-morbidities.   [] Plan just implemented, too soon to assess goals progression <30days   [] Goals require adjustment due to lack of progress  [] Patient is not progressing as expected and requires additional follow up with physician  [] Other    Prognosis for POC: [x] Good [] Fair  [] Poor      Patient requires continued skilled intervention: [x] Yes  [] No    Treatment/Activity Tolerance:  [x] Patient able to complete treatment  [] Patient limited by fatigue  [] Patient limited by pain    [] Patient limited by other medical complications  [] Other:     ASSESSMENT: Pt had porgressively less pain by end os session. She continues to make steady progression. PLAN: See eval. Pt to be seen 2x wk for 3 more weeks . Next visit: cont ionto patch, see if dorsal foot nad midfoot pain better after making adjustments to tx this session, start preparing for D/C if so. .[x] Continue per plan of care [] Alter current plan (see comments above)  [] Plan of care initiated [] Hold pending MD visit [] Discharge      Electronically signed by:  Twila Garvin PT    Note: If patient does not return for scheduled/ recommended follow up visits, this note will serve as a discharge from care along with most recent update on progress.

## 2020-10-25 ENCOUNTER — TELEPHONE (OUTPATIENT)
Dept: FAMILY MEDICINE CLINIC | Age: 70
End: 2020-10-25

## 2020-10-26 ENCOUNTER — HOSPITAL ENCOUNTER (OUTPATIENT)
Dept: PHYSICAL THERAPY | Age: 70
Setting detail: THERAPIES SERIES
Discharge: HOME | End: 2020-10-26
Payer: MEDICARE

## 2020-10-26 PROCEDURE — 97110 THERAPEUTIC EXERCISES: CPT

## 2020-10-26 PROCEDURE — 97033 APP MDLTY 1+IONTPHRSIS EA 15: CPT

## 2020-10-26 NOTE — TELEPHONE ENCOUNTER
Tell the patient her cologuard results. There is a very low chance of a colorectal cancer. This test should be repeated in 3 years. If an change in the bowels or bleeding or abdominal pain, that needs to be evaluated . MA- enter this in Health Maintainence.

## 2020-10-26 NOTE — FLOWSHEET NOTE
Progressing: [x] Met: [] Not Met: [] Adjusted  2. Patient will have a decrease in pain to facilitate improvement in movement, function, and ADLs as indicated by Functional Deficits. [] Progressing: [x] Met: [] Not Met: [] Adjusted    Long Term Goals: To be achieved in: 5 weeks  1. Disability index score of 10% or less for the LEFS to assist with reaching prior level of function. [] Progressing: [] Met: [] Not Met: [] Adjusted  2. Patient will demonstrate normalized hindfoot and forefoot joint mobility symmetrical to uninvolved limb to allow for proper joint functioning as indicated by patients Functional Deficits. [x] Progressing: [] Met: [] Not Met: [] Adjusted  3. Patient will demonstrate an increase in Strength to good proximal hip strength and control, within 5lb HHD in LE to allow for proper functional mobility as indicated by patients Functional Deficits. [x] Progressing: [] Met: [] Not Met: [] Adjusted  4. Patient will be able to sleep at least 6 hours undisturbed by foot pain. [] Progressing: [x] Met: [] Not Met: [] Adjusted  5. Pt will be able to walk with a normalized gait pattern without increased symptoms for at least 1 hour. [x] Progressing: [] Met: [] Not Met: [] Adjusted     Progression Towards Functional goals:  [x] Patient is progressing as expected towards functional goals listed. [] Progression is slowed due to complexities listed. [] Progression has been slowed due to co-morbidities. [] Plan just implemented, too soon to assess goals progression  [] Other:         Overall Progression Towards Functional goals/ Treatment Progress Update:  [x] Patient is progressing as expected towards functional goals listed. [] Progression is slowed due to complexities/Impairments listed. [] Progression has been slowed due to co-morbidities.   [] Plan just implemented, too soon to assess goals progression <30days   [] Goals require adjustment due to lack of progress  [] Patient is not progressing

## 2020-10-30 ENCOUNTER — HOSPITAL ENCOUNTER (OUTPATIENT)
Dept: PHYSICAL THERAPY | Age: 70
Setting detail: THERAPIES SERIES
Discharge: HOME | End: 2020-10-30
Payer: MEDICARE

## 2020-10-30 PROCEDURE — 97140 MANUAL THERAPY 1/> REGIONS: CPT

## 2020-10-30 PROCEDURE — 97033 APP MDLTY 1+IONTPHRSIS EA 15: CPT

## 2020-10-30 PROCEDURE — 97110 THERAPEUTIC EXERCISES: CPT

## 2020-10-30 NOTE — DISCHARGE SUMMARY
[facility] Orthopaedics and [address] [place] [address]; [place]         Physical Therapy Discharge  Date: 10/30/2020        Patient Name:  Bill Colin    :  1950  MRN: [id number]  Medical/Treatment Diagnosis Information:  · Diagnosis: M72.2 R plantar fascitis  · Treatment Diagnosis: M72.2 R plantar fascitis, R26.2 difficulty walking, M25.674 R foot stiffness  Insurance/Certification information:  PT Insurance Information: [employer], [facility]  Physician Information:  Referring Practitioner: Dr. Laverne Duncan    Number of Comorbidities:  []0     [x]1-2    []3+  Total number of visits:9  Reporting Period:   Beginning Date:10/2/20   End Date:10/30/20    OBJECTIVE  Test used Initial score Discharge Score   Pain Summary VAS 6-7/10 0/10   Functional questionnaire LEFS 64% disability 30% disability      ROM LEFT - EVAL RIGHT - EVAL Right 10/30/20   Ankle  deg 152 deg 172 deg   Ankle DF 5 deg 8 deg 20 deg   Soleus angle 9 deg 12 deg 20 deg   Ankle In 52deg 40 deg 48 deg   Ankle Ev 10 deg 13 deg 14 deg   Strength  LEFT RIGHT RIGHT   Ankle PF 5/5 4/5 5/5   Ankle Inv 5/5 4-/5 5/5   Ankle EV 5/5 4-/5 5/5   Toes ext 5/5 4/5 5/5   Toes flex 5/5 4/5 5/5        Treatment to date:  [x] Therapeutic Exercise    [] Modalities:  [] Therapeutic Activity             []Ultrasound            []Electrical Stimulation  [] Gait Training     []Cervical Traction    [] Lumbar Traction  [x] Neuromuscular Re-education [] Cold/hotpack         [x]Iontophoresis  [x] Instruction in HEP      Other:  [x] Manual Therapy                   []                                  []   Assessment:   [x] All Goals were achieved.     [] The following goals were achieved (#'s):  [x] The following goals were not achieved for the following reasons:/assessmen of improvement as it relates to each goal: Pt made great progress on LEFS score to reduction to only 30% disability but did not achieve to only 10% disability but pt reports this is also because of her knee pain not foot pain. Plan of Care:  [x] Discharge from Therapy Services due to:pain resolved and goals met  Reason for Discharge:   [x] All goals achieved    [] Patient having surgery  [] Physician discontinued therapy  [] Insurance/Financial Limitations [] Patient did not return for follow up visits [] Home program/1 visit only   [] No subjective or objective improvement [] Plateaued   [] Patient was unable to adhere to the plan of care established at evaluation. [] Referred back to physician for re-evaluation and did not return.      [] Other:       Electronically signed by:  Aleksander Britt, PT        Physical Therapy Treatment Note/ Progress Report:           Date:  10/30/2020    Patient Name:  Jayne Christensen    :  1950  MRN: [id number]  Restrictions/Precautions:    Medical/Treatment Diagnosis Information:  · Diagnosis: M72.2 R plantar fascitis  · Treatment Diagnosis: M72.2 R plantar fascitis, R26.2 difficulty walking, M25.674 R foot stiffness  Insurance/Certification information:  PT Insurance Information: [employer] [facility]  Physician Information:  Referring Practitioner: Dr. Ruben Dickinson  Has the plan of care been signed (Y/N):        []  Yes  [x]  No     Date of Patient follow up with Physician: as needed      Is this a Progress Report:     []  Yes  [x]  No        If Yes:  Date Range for reporting period:  Beginning 10/2/202  Ending    Progress report will be due (10 Rx or 30 days whichever is less):       Recertification will be due (POC Duration  / 90 days whichever is less): 2020      Visit # Insurance Allowable Auth Required   In-person 9 Med nec [x]  Yes []  No    Telehealth - ? []  Yes []  No    Total 9         Functional Scale: 2980 = 36% ability, 64% disability   Date assessed:  10/2/2020             5680 = 70% ability, 30% disability   Date assessed: 10/30/2020      Number of Comorbidities:  []0     [x]1-2 []3+    Latex Allergy:  [x]NO      []YES  Preferred Language for Healthcare:   [x]English       []other:      Pain level:  0/10    SUBJECTIVE:  Pt considers herself 99.9% improved. For the past week she has been pain-free in planar foot, heel, and dorsal foot. She has been consistent with tennis shoes rather than always wearing Crocks. OBJECTIVE:    Observation: no tenderness, no crepitus, normal gait  Test measurements:  Gastroc 20 deg, Soleus 20 deg, Inversion: 48 deg, Eversion:14 deg   MMT: DF 5/5, PF 5/5, Inv 5/5, Ev 5/5     RESTRICTIONS/PRECAUTIONS: none    Exercises/Interventions:     Therapeutic Ex (26591) Sets/Reps/ sec Notes/CUES   Toe curls HEP   Toes extension HEP   Foot doming  HEP   FHL wall stretch HEP   Gastroc/Soleus stretch    Tband PF, inv, ev  verbal reviewHEP 10/5   Towel scrunch     Ecc Heel raise on incline  HEP 10/5   Calf towel stretch  verbal review    Reformer: DL heel raise on bar    Ref: SL heel raise on bar    Ref: heels on bar DF/PF    BAPS: DF/PF, inv/ev, CW/CCW    Heel raise     Pt edu 10 min Use of HEP, use of self massage with tennis ball or The Stick,  proper footwear rather than always wearing Crocks.     Manual Intervention (19144)     Hindfoot & midfoot jt mobilization  D/C due to new midfoot jt pain   STM:  ant tib, EDL 20 min                        NMR re-education (42294)  CUES NEEDED   SLB airex    SLB turn head    Tandem on airex, turn head    Steamboats                              Therapeutic Activity (38493)                         Modalities     Ionto: 14 hr wear patch, 1.3mL dex 8 min placement and edu R meidal inferior heel up around midfoot joint         Therapeutic Exercise and NMR EXR  [x] (26929) Provided verbal/tactile cueing for activities related to strengthening, flexibility, endurance, ROM for improvements in LE, proximal hip, and core control with self care, mobility, lifting, ambulation.  [] (91598) Provided verbal/tactile cueing for activities related to improving balance, coordination, kinesthetic sense, posture, motor skill, proprioception  to assist with LE, proximal hip, and core control in self care, mobility, lifting, ambulation and eccentric single leg control. NMR and Therapeutic Activities:    [] (66879 or 38680) Provided verbal/tactile cueing for activities related to improving balance, coordination, kinesthetic sense, posture, motor skill, proprioception and motor activation to allow for proper function of core, proximal hip and LE with self care and ADLs  [] (08027) Gait Re-education- Provided training and instruction to the patient for proper LE, core and proximal hip recruitment and positioning and eccentric body weight control with ambulation re-education including up and down stairs     Home Exercise Program:    [x] (10687) Reviewed/Progressed HEP activities related to strengthening, flexibility, endurance, ROM of core, proximal hip and LE for functional self-care, mobility, lifting and ambulation/stair navigation   [] (13771)Reviewed/Progressed HEP activities related to improving balance, coordination, kinesthetic sense, posture, motor skill, proprioception of core, proximal hip and LE for self care, mobility, lifting, and ambulation/stair navigation      Manual Treatments:  PROM / STM / Oscillations-Mobs:  G-I, II, III, IV (PA's, Inf., Post.)  [x] (98917) Provided manual therapy to mobilize LE, proximal hip and/or LS spine soft tissue/joints for the purpose of modulating pain, promoting relaxation,  increasing ROM, reducing/eliminating soft tissue swelling/inflammation/restriction, improving soft tissue extensibility and allowing for proper ROM for normal function with self care, mobility, lifting and ambulation. Modalities:     [] GAME READY (VASO)- for significant edema, swelling, pain control. Iontophoresis: to address localized inflammation and pain.  Pt to wear ionto home patch    Charges:  Timed Code Treatment Minutes: 38 towards functional goals listed. [] Progression is slowed due to complexities listed. [] Progression has been slowed due to co-morbidities. [] Plan just implemented, too soon to assess goals progression  [] Other:         Overall Progression Towards Functional goals/ Treatment Progress Update:  [x] Patient is progressing as expected towards functional goals listed. [] Progression is slowed due to complexities/Impairments listed. [] Progression has been slowed due to co-morbidities. [] Plan just implemented, too soon to assess goals progression <30days   [] Goals require adjustment due to lack of progress  [] Patient is not progressing as expected and requires additional follow up with physician  [] Other    Prognosis for POC: [x] Good [] Fair  [] Poor      Patient requires continued skilled intervention: [x] Yes  [] No    Treatment/Activity Tolerance:  [x] Patient able to complete treatment  [] Patient limited by fatigue  [] Patient limited by pain    [] Patient limited by other medical complications  [] Other:     ASSESSMENT: Pt now has normal ROM and flexibility reduce pain tenderness and crepitus, much improved foot intrinsic and extrinsic strength, better understanding of proper footwear and is ready for D/C. She is very happy with her recovery and reports being back into all normal activities. PLAN: D/C  . [] Continue per plan of care [] Alter current plan (see comments above)  [] Plan of care initiated [] Hold pending MD visit [x] Discharge      Electronically signed by:  Marisol Bahena PT    Note: If patient does not return for scheduled/ recommended follow up visits, this note will serve as a discharge from care along with most recent update on progress.

## 2020-11-05 NOTE — TELEPHONE ENCOUNTER
----- Message from Moises Dhillon sent at 10/1/2020 11:04 AM EDT -----  Subject: Referral Request    QUESTIONS   Reason for referral request? Pt is requesting a referral to a Physical   Therapist for right heel with plantar fasciitis. Has the physician seen you for this condition before? Yes  Select a date? 2016-06-01  Select the physician (PCP or Specialist)? Outside Physician - Dr. Patricia Morrison   Preferred Specialist (if applicable)? Do you already have an appointment scheduled? Yes  Select Scheduled Date? 2020-10-02  Select Scheduled Physician? SCHEDULE   [facility]   Additional Information for Provider? Scheduled with [facility] [address] [facility] on 10/2 does not know exactly what date she last saw Dr. Patricia Morrison   about this issue.  ---------------------------------------------------------------------------  --------------  [address]  What is the best way for the office to contact you? OK to leave message on   voicemail   OK to respond with secure message via Variable portal (only for patients   who have registered Variable account)  Preferred Call Back Phone Number?  [phone] DISPLAY PLAN FREE TEXT

## 2021-01-06 ENCOUNTER — TELEPHONE (OUTPATIENT)
Dept: FAMILY MEDICINE CLINIC | Age: 71
End: 2021-01-06

## 2021-03-10 ENCOUNTER — OFFICE VISIT (OUTPATIENT)
Dept: FAMILY MEDICINE CLINIC | Age: 71
End: 2021-03-10
Payer: MEDICARE

## 2021-03-10 VITALS
WEIGHT: 225 LBS | HEIGHT: 63 IN | OXYGEN SATURATION: 95 % | DIASTOLIC BLOOD PRESSURE: 82 MMHG | TEMPERATURE: 97.8 F | BODY MASS INDEX: 39.87 KG/M2 | RESPIRATION RATE: 16 BRPM | SYSTOLIC BLOOD PRESSURE: 124 MMHG | HEART RATE: 75 BPM

## 2021-03-10 DIAGNOSIS — Z00.00 ROUTINE GENERAL MEDICAL EXAMINATION AT A HEALTH CARE FACILITY: Primary | ICD-10-CM

## 2021-03-10 PROCEDURE — 3017F COLORECTAL CA SCREEN DOC REV: CPT | Performed by: FAMILY MEDICINE

## 2021-03-10 PROCEDURE — G8484 FLU IMMUNIZE NO ADMIN: HCPCS | Performed by: FAMILY MEDICINE

## 2021-03-10 PROCEDURE — 4040F PNEUMOC VAC/ADMIN/RCVD: CPT | Performed by: FAMILY MEDICINE

## 2021-03-10 PROCEDURE — 1123F ACP DISCUSS/DSCN MKR DOCD: CPT | Performed by: FAMILY MEDICINE

## 2021-03-10 PROCEDURE — G0439 PPPS, SUBSEQ VISIT: HCPCS | Performed by: FAMILY MEDICINE

## 2021-03-10 ASSESSMENT — PATIENT HEALTH QUESTIONNAIRE - PHQ9
SUM OF ALL RESPONSES TO PHQ QUESTIONS 1-9: 1
1. LITTLE INTEREST OR PLEASURE IN DOING THINGS: 0
SUM OF ALL RESPONSES TO PHQ QUESTIONS 1-9: 1

## 2021-03-10 NOTE — PROGRESS NOTES
Here for medicare wellness. Doing OK. Questions reviewed and discussed. Follow in   For meds / labs    Small ganglion on left thumb. Observe , call when needs fixed  Medicare Annual Wellness Visit  Name: Titus Belcher Date: 3/13/2021   MRN: <[id number]> Sex: Female   Age: 78 y.o. Ethnicity: Non-/Non    : 1950 Race: Omkar Dillon is here for Medicare AWV    Screenings for behavioral, psychosocial and functional/safety risks, and cognitive dysfunction are all negative except as indicated below. These results, as well as other patient data from the [address] form, are documented in Flowsheets linked to this Encounter. Allergies   Allergen Reactions    Cortisone Acetate Other (See Comments)     agitation       Prior to Visit Medications    Medication Sig Taking?  Authorizing Provider   ASHWAGANDHA PO Take by mouth Yes Historical Provider, MD   omeprazole (PRILOSEC) 40 MG delayed release capsule TAKE 1 CAPSULE EVERY DAY Yes Gustavo Gonzalez DO   fluticasone (FLONASE) 50 MCG/ACT nasal spray USE 2 SPRAYS IN EACH NOSTRIL EVERY DAY Yes Gustavo Gonzalez DO   DULoxetine (CYMBALTA) 20 MG extended release capsule TAKE 1 CAPSULE TWICE DAILY Yes Gustavo Gonzalez DO   pregabalin (LYRICA) 75 MG capsule TAKE 1 CAPSULE THREE TIMES DAILY Yes Gustavo Gonzalez DO   Cholecalciferol (VITAMIN D3) 50 MCG ( UT) TABS Take by mouth Yes Historical Provider, MD   calcium carbonate (TUMS) 500 MG chewable tablet Take 1 tablet by mouth daily Yes Historical Provider, MD   Loratadine (CLARITIN PO) Take 10 mg by mouth daily as needed  Yes Historical Provider, MD       Past Medical History:   Diagnosis Date    Anxiety     Arthritis     knee    Back pain     Depression     Fibromyalgia     Hemorrhoids     Obesity     PONV (postoperative nausea and vomiting)     Reflux     Sleep apnea     uses CPAP    TMJ syndrome     Vision impairment     glasses       Past Surgical History: Procedure Laterality Date    BREAST SURGERY      reduction    BROW LIFT Bilateral 11/12/13    FINGER TRIGGER RELEASE  12/9/11    left thumb and cyst    FINGER TRIGGER RELEASE Bilateral     4th finger    FINGER TRIGGER RELEASE Right 7/6/2015    index and long    FINGER TRIGGER RELEASE Left 2/22/16    FOOT SURGERY  1980's    Bilat.  feet /toes shaved\"    JOINT REPLACEMENT Right 11/09/2016    knee    JOINT REPLACEMENT Left 10/09/2017    knee    KNEE ARTHROSCOPY  11-    Left knee video arthroscopy, partial medial and lateral meniscectomy    KNEE SURGERY  3/17/2011    Video arthroscopy withpartial medial menisectomy rt. knee    MOUTH SURGERY      OTHER SURGICAL HISTORY Left     dequervains    OTHER SURGICAL HISTORY  10/09/2017    LEFT TOTAL KNEE ARTHROPLASTY              TOTAL KNEE ARTHROPLASTY Right 11/09/2016    TUBAL LIGATION         Family History   Problem Relation Age of Onset    Cancer Father     Diabetes Sister     Other Sister         CHELSI    Stroke Mother     Cancer Brother         lung    Other Brother         CHELSI    Heart Disease Brother         angina       CareTeam (Including outside providers/suppliers regularly involved in providing care):   Patient Care Team:  Jada Diaz DO as PCP - Yasmani ReynaldoDO as PCP - [facility] Empaneled Provider  Wesley Workman MD as Surgeon (Orthopedic Surgery)  Allan Sal PA-C as Physician Assistant (Orthopedic Surgery)  Scott Sanders MD as Consulting Physician (Sleep Medicine)  TOM Thompson CNP as Nurse Practitioner (Nurse Practitioner)    Wt Readings from Last 3 Encounters:   03/10/21 225 lb (102.1 kg)   09/30/20 225 lb (102.1 kg)   02/11/20 221 lb (100.2 kg)     Vitals:    03/10/21 0756   BP: 124/82   Site: Left Upper Arm   Position: Sitting   Pulse: 75   Resp: 16   Temp: 97.8 °F (36.6 °C)   TempSrc: Temporal   SpO2: 95%   Weight: 225 lb (102.1 kg)   Height: 5' 3\" (1.6 m)     Body mass index is 39.86

## 2021-03-10 NOTE — PATIENT INSTRUCTIONS
Continue current medications. See me in [place] for blood and  Sugar check  Personalized Preventive Plan for Sandeep Nazario - 3/10/2021  Medicare offers a range of preventive health benefits. Some of the tests and screenings are paid in full while other may be subject to a deductible, co-insurance, and/or copay. Some of these benefits include a comprehensive review of your medical history including lifestyle, illnesses that may run in your family, and various assessments and screenings as appropriate. After reviewing your medical record and screening and assessments performed today your provider may have ordered immunizations, labs, imaging, and/or referrals for you. A list of these orders (if applicable) as well as your Preventive Care list are included within your After Visit Summary for your review. Other Preventive Recommendations:    · A preventive eye exam performed by an eye specialist is recommended every 1-2 years to screen for glaucoma; cataracts, macular degeneration, and other eye disorders. · A preventive dental visit is recommended every 6 months. · Try to get at least 150 minutes of exercise per week or 10,000 steps per day on a pedometer . · Order or download the FREE \"Exercise & Physical Activity: Your Everyday Guide\" from [employer]. Call 1-414.695.7240 or search [employer] online. · You need 4138-9011 mg of calcium and 3063-3147 IU of vitamin D per day. It is possible to meet your calcium requirement with diet alone, but a vitamin D supplement is usually necessary to meet this goal.  · When exposed to the sun, use a sunscreen that protects against both UVA and UVB radiation with an SPF of 30 or greater. Reapply every 2 to 3 hours or after sweating, drying off with a towel, or swimming. · Always wear a seat belt when traveling in a car. Always wear a helmet when riding a bicycle or motorcycle. Continue the current medications.   Work on strategies for calorie reduction and increase physical activity to try to retain some weight loss.

## 2021-03-12 ENCOUNTER — TELEPHONE (OUTPATIENT)
Dept: FAMILY MEDICINE CLINIC | Age: 71
End: 2021-03-12

## 2021-03-12 NOTE — TELEPHONE ENCOUNTER
Please call the patient and tell her that I will not prescribe hydroxychloroquine. It is not used for prophylaxis in Covid and has the potential for some very serious cardiac side effects. The same is true for Zithromax. It does not prevent. Is sometimes used to treat Covid pneumonia, but not use preventatively. Tell her I wish I had better news for her but that is the medical data that I can share and I will not prescribe the meds.

## 2021-03-12 NOTE — TELEPHONE ENCOUNTER
Patient is wanting to know if you can please call in [facility]. in the meantime while she waits to get the [place] and ellie Vaccine  Patient is wanting to go see her grandchildren please advise and call in to [facility] thank you patient cannot get in anywhere to get the vaccine.

## 2021-03-12 NOTE — TELEPHONE ENCOUNTER
Patient is also asking for [facility]  Also to be proactive while she waits for covid vaccine Speaking Coherently

## 2021-04-08 DIAGNOSIS — M79.7 FIBROMYALGIA: ICD-10-CM

## 2021-04-08 RX ORDER — PREGABALIN 75 MG/1
CAPSULE ORAL
Qty: 270 CAPSULE | Refills: 1 | Status: SHIPPED | OUTPATIENT
Start: 2021-04-08 | End: 2021-08-03 | Stop reason: SDUPTHER

## 2021-04-08 NOTE — TELEPHONE ENCOUNTER
3/10/2021        Future Appointments   Date Time Provider Vickey Rea   7/15/2021 11:00 AM TOM Valencia - CNP [facility]

## 2021-07-15 ENCOUNTER — VIRTUAL VISIT (OUTPATIENT)
Dept: PULMONOLOGY | Age: 71
End: 2021-07-15
Payer: MEDICARE

## 2021-07-15 DIAGNOSIS — E66.01 CLASS 3 SEVERE OBESITY DUE TO EXCESS CALORIES WITHOUT SERIOUS COMORBIDITY WITH BODY MASS INDEX (BMI) OF 40.0 TO 44.9 IN ADULT (HCC): ICD-10-CM

## 2021-07-15 DIAGNOSIS — G47.33 OBSTRUCTIVE SLEEP APNEA: Primary | ICD-10-CM

## 2021-07-15 DIAGNOSIS — J30.2 SEASONAL ALLERGIES: Chronic | ICD-10-CM

## 2021-07-15 PROCEDURE — 99443 PR PHYS/QHP TELEPHONE EVALUATION 21-30 MIN: CPT | Performed by: NURSE PRACTITIONER

## 2021-07-15 ASSESSMENT — SLEEP AND FATIGUE QUESTIONNAIRES
HOW LIKELY ARE YOU TO NOD OFF OR FALL ASLEEP WHILE SITTING AND TALKING TO SOMEONE: 0
HOW LIKELY ARE YOU TO NOD OFF OR FALL ASLEEP WHILE SITTING QUIETLY AFTER LUNCH WITHOUT ALCOHOL: 0
HOW LIKELY ARE YOU TO NOD OFF OR FALL ASLEEP WHEN YOU ARE A PASSENGER IN A CAR FOR AN HOUR WITHOUT A BREAK: 0
HOW LIKELY ARE YOU TO NOD OFF OR FALL ASLEEP WHILE SITTING INACTIVE IN A PUBLIC PLACE: 0
HOW LIKELY ARE YOU TO NOD OFF OR FALL ASLEEP WHILE LYING DOWN TO REST IN THE AFTERNOON WHEN CIRCUMSTANCES PERMIT: 0
HOW LIKELY ARE YOU TO NOD OFF OR FALL ASLEEP IN A CAR, WHILE STOPPED FOR A FEW MINUTES IN TRAFFIC: 0
HOW LIKELY ARE YOU TO NOD OFF OR FALL ASLEEP WHILE SITTING AND READING: 0
HOW LIKELY ARE YOU TO NOD OFF OR FALL ASLEEP WHILE WATCHING TV: 0
ESS TOTAL SCORE: 0

## 2021-07-15 NOTE — PROGRESS NOTES
Luz Elena Gonzalez MD, FAASM, [facility]  Jorge Hancock, MSN, RN, [phone]  Dept: [phone]  Dept Fax: [phone]  Loc: [phone]    Subjective:     Patient ID: Fernando Peguero is a 78 y.o. female. Chief Complaint   Patient presents with    Sleep Apnea       HPI:     Sleep Medicine Telephone Visit    Pursuant to the emergency declaration under the [address] [place] [address] [facility] authority and the [employer] Act this Telephone Visit was insisted, with patient's consent, to reduce the patient's risk of exposure to COVID-19 and provide continuity of care for an established patient. Services were provided through a synchronous discussion over a telephone chat to substitute for in-person clinic visit, and coded as such. While patient is at home.     Machine Modem/Download Info:  Compliance (hours/night): 10.1 hrs/night  Download AHI (/hour): 3.5 /HR  Average CPAP Pressure : 12.3 cmH2O           APAP - Settings  Pressure Min: 10 cmH2O  Pressure Max: 18 cmH2O                 Comfort Settings  Humidity Level (0-8): 2  Flex/EPR (0-3): 3 PAP Mask  Mask Type: Nasal pillows  Clinically Relevant Leak: No     [place] - Total score: 0    Follow-up :     Last Visit : July 2020      Subjective Health Changes: none        Over Night Oximetry: [] Yes  [] No  [x] NA [] WNL   Using O2: [] Yes  [] No  [x] NA   Patient is compliant with the machine  [x] Yes  [] No [] Per patient   Feeling rested when using the machine   [x] Yes  [] No     Pressure is comfortable with inspiration and expiration  [x] Yes  [] No   ([x] NA   [] Feeling of suffocation  [] Feeling like not enough air    [] To much pressure)     Noticed changes in pressure  [x] NA  [] Yes    [] No     Mask is fitting well  [x] Yes  [] No   Noting Mask Air Leak  [] Yes  [x] No Having painful Aerophagia  [] Yes  [x] No   Nocturia   1-2  per night. Having  HA upon waking  [] Yes  [x] No   Dry mouth upon waking   Dry Nose  Dry Eyes  [x] Yes  [] No   Congestion upon waking   [] Yes  [x] No    Nose Bleeds  [] Yes  [x] No   Using Sleep Aides  [x] NA  [] OTC  [] Per our office   [] Per another provider   Understands how to change humidification and/or tubing temperature for comfort while at home  [x] Yes  [] No     Difficulties falling asleep  [] Yes  [x] No   Difficulties staying asleep  [] Yes  [x] No   Approximate time to bed  9-9:30pm   Approximate wake time  5:30-6:30am   Taking Naps  no   If taking naps usual length  [x] NA   If taking naps using the machine [x] NA  [] Yes    [] No    [] With and With out    Drowsy when driving  [] Yes  [x] No     Does patient carry a DOT/CDL  [] Yes  [x] No     Does patient carry FAA/[employer] License   [] Yes  [x] No      Any concerns noted with the machine at this time  [] Yes  [x] No         Assessment/Plan:   1. Obstructive sleep apnea  Assessment & Plan:  After downloading data and reviewing  Reviewed compliance download with pt. Supplies and parts as needed for his machine. These are medically necessary. Continue medications per his PCP and other physicians. Limit caffeine use after 3pm.    The chronic medical conditions listed are directly related to the primary diagnosis listed above. The management of the primary diagnosis affects the secondary diagnosis and vice versa    Patient is complaint encouraged to maintain compliance to aide on controlling other stated healthcare concerns. 2. Seasonal allergies  Assessment & Plan:  Chronic- stable. After speaking with patient:    Agree with current plan, and would agree to continue this plan per prescribing and managing physician.       3. Class 3 severe obesity due to excess calories without serious comorbidity with body mass index (BMI) of 40.0 to 44.9 in adult [facility])  Assessment & Plan:  Patient encouraged to work on maintaining a healthy weight per height. Achievable with diet restriction/modifications and exercise (may consult primary care if unsure of any restrictions or concerns). Weight management directly correlates to risk of control and maintenance of Obstructive Sleep Apnea. - After pulling data and reviewing it   - Reviewed compliance download with patient    -Medically necessary supplies and parts as needed for her machine.   - Continue medications per his primary care provider and other physicians.   - Encouraged to limit caffeine use after 3pm.    - Encouraged her to work on weight loss through diet and exercise  - Educated not to drive when feeling sleepy   - Patient using [employer]  - Education on  Office locations  Compliance  Follow up  After speaking to the patient, patient is currently stable. We will continue with the current machine settings  Recall Information and DME contact   - 25 min spent with the patient, prepping for the appointment time and education     The chronic medical conditions listed are directly related to the primary diagnosis listed above. The management of the primary diagnosis affects the secondary diagnosis and vice versa.     - Will follow up in off in 12 months     Electronically signed by Verona Nagel, MSN, RN, CNP on 7/15/2021 at 11:13 AM

## 2021-08-03 DIAGNOSIS — M79.7 FIBROMYALGIA: ICD-10-CM

## 2021-08-03 RX ORDER — PREGABALIN 75 MG/1
CAPSULE ORAL
Qty: 270 CAPSULE | Refills: 1 | Status: SHIPPED | OUTPATIENT
Start: 2021-08-03 | End: 2021-12-07 | Stop reason: SDUPTHER

## 2021-08-10 ENCOUNTER — TELEPHONE (OUTPATIENT)
Dept: FAMILY MEDICINE CLINIC | Age: 71
End: 2021-08-10

## 2021-08-10 NOTE — TELEPHONE ENCOUNTER
Future Appointments   Date Time Provider Vickey Rea   8/26/2021  9:00 AM DO EUGENE Hurst [place] - DY[facility]   7/14/2022 11:00 AM TOM Blanton CNP SLEEP [facility]

## 2021-08-10 NOTE — TELEPHONE ENCOUNTER
----- Message from Alysia Roberts sent at 8/10/2021  9:49 AM EDT -----  Subject: Message to Provider    QUESTIONS  Information for Provider? needs orders for blood drawn before her 8/26   appt   ---------------------------------------------------------------------------  --------------  CALL BACK INFO  What is the best way for the office to contact you? OK to leave message on   voicemail  Preferred Call Back Phone Number? [phone]  ---------------------------------------------------------------------------  --------------  SCRIPT ANSWERS  Relationship to Patient?  Self

## 2021-08-19 DIAGNOSIS — E78.2 MIXED HYPERLIPIDEMIA: ICD-10-CM

## 2021-08-19 DIAGNOSIS — M79.7 FIBROMYALGIA: Primary | ICD-10-CM

## 2021-08-19 DIAGNOSIS — E55.9 VITAMIN D DEFICIENCY: ICD-10-CM

## 2021-08-19 DIAGNOSIS — Z00.00 ROUTINE GENERAL MEDICAL EXAMINATION AT A HEALTH CARE FACILITY: ICD-10-CM

## 2021-08-26 ENCOUNTER — OFFICE VISIT (OUTPATIENT)
Dept: FAMILY MEDICINE CLINIC | Age: 71
End: 2021-08-26
Payer: MEDICARE

## 2021-08-26 VITALS
TEMPERATURE: 97.5 F | SYSTOLIC BLOOD PRESSURE: 140 MMHG | BODY MASS INDEX: 40.75 KG/M2 | OXYGEN SATURATION: 97 % | WEIGHT: 230 LBS | HEIGHT: 63 IN | HEART RATE: 67 BPM | RESPIRATION RATE: 16 BRPM | DIASTOLIC BLOOD PRESSURE: 82 MMHG

## 2021-08-26 DIAGNOSIS — F33.42 RECURRENT MAJOR DEPRESSIVE DISORDER, IN FULL REMISSION (HCC): ICD-10-CM

## 2021-08-26 DIAGNOSIS — M79.7 FIBROMYALGIA: ICD-10-CM

## 2021-08-26 DIAGNOSIS — Z00.00 ROUTINE GENERAL MEDICAL EXAMINATION AT A HEALTH CARE FACILITY: Primary | ICD-10-CM

## 2021-08-26 DIAGNOSIS — E78.2 MIXED HYPERLIPIDEMIA: ICD-10-CM

## 2021-08-26 PROCEDURE — 99397 PER PM REEVAL EST PAT 65+ YR: CPT | Performed by: FAMILY MEDICINE

## 2021-08-26 SDOH — ECONOMIC STABILITY: FOOD INSECURITY: WITHIN THE PAST 12 MONTHS, YOU WORRIED THAT YOUR FOOD WOULD RUN OUT BEFORE YOU GOT MONEY TO BUY MORE.: NEVER TRUE

## 2021-08-26 SDOH — ECONOMIC STABILITY: HOUSING INSECURITY
IN THE LAST 12 MONTHS, WAS THERE A TIME WHEN YOU DID NOT HAVE A STEADY PLACE TO SLEEP OR SLEPT IN A SHELTER (INCLUDING NOW)?: NO

## 2021-08-26 SDOH — ECONOMIC STABILITY: FOOD INSECURITY: WITHIN THE PAST 12 MONTHS, THE FOOD YOU BOUGHT JUST DIDN'T LAST AND YOU DIDN'T HAVE MONEY TO GET MORE.: NEVER TRUE

## 2021-08-26 SDOH — ECONOMIC STABILITY: INCOME INSECURITY: IN THE LAST 12 MONTHS, WAS THERE A TIME WHEN YOU WERE NOT ABLE TO PAY THE MORTGAGE OR RENT ON TIME?: NO

## 2021-08-26 ASSESSMENT — PATIENT HEALTH QUESTIONNAIRE - PHQ9
1. LITTLE INTEREST OR PLEASURE IN DOING THINGS: 1
2. FEELING DOWN, DEPRESSED OR HOPELESS: 1
SUM OF ALL RESPONSES TO PHQ QUESTIONS 1-9: 2
SUM OF ALL RESPONSES TO PHQ9 QUESTIONS 1 & 2: 2
SUM OF ALL RESPONSES TO PHQ QUESTIONS 1-9: 2
SUM OF ALL RESPONSES TO PHQ QUESTIONS 1-9: 2

## 2021-08-26 ASSESSMENT — ENCOUNTER SYMPTOMS
CONSTIPATION: 0
BLOOD IN STOOL: 0
DIARRHEA: 0
RESPIRATORY NEGATIVE: 1

## 2021-08-26 ASSESSMENT — SOCIAL DETERMINANTS OF HEALTH (SDOH): HOW HARD IS IT FOR YOU TO PAY FOR THE VERY BASICS LIKE FOOD, HOUSING, MEDICAL CARE, AND HEATING?: NOT VERY HARD

## 2021-08-26 NOTE — PROGRESS NOTES
Subjective:      Patient ID: Yasmine Boyd is a 73 y.o. y.o. female. Here for annual.  Has been OK overall. Lost her brother in the spring. There is no interval hx of hospitalization or significant illness  Meds and history reviewed  HPI      Chief Complaint   Patient presents with    Annual Exam     Labs completed , physical ,        Allergies   Allergen Reactions    Cortisone Acetate Other (See Comments)     agitation       Past Medical History:   Diagnosis Date    Anxiety     Arthritis     knee    Back pain     Depression     Fibromyalgia     Hemorrhoids     Obesity     PONV (postoperative nausea and vomiting)     Reflux     Sleep apnea     uses CPAP    TMJ syndrome     Vision impairment     glasses       Past Surgical History:   Procedure Laterality Date    BREAST SURGERY      reduction    BROW LIFT Bilateral 11/12/13    FINGER TRIGGER RELEASE  12/9/11    left thumb and cyst    FINGER TRIGGER RELEASE Bilateral     4th finger    FINGER TRIGGER RELEASE Right 7/6/2015    index and long    FINGER TRIGGER RELEASE Left 2/22/16    FOOT SURGERY  1980's    Bilat.  feet /toes shaved\"    JOINT REPLACEMENT Right 11/09/2016    knee    JOINT REPLACEMENT Left 10/09/2017    knee    KNEE ARTHROSCOPY  11-    Left knee video arthroscopy, partial medial and lateral meniscectomy    KNEE SURGERY  3/17/2011    Video arthroscopy withpartial medial menisectomy rt. knee    MOUTH SURGERY      OTHER SURGICAL HISTORY Left     dequervains    OTHER SURGICAL HISTORY  10/09/2017    LEFT TOTAL KNEE ARTHROPLASTY              TOTAL KNEE ARTHROPLASTY Right 11/09/2016    TUBAL LIGATION         Social History     Socioeconomic History    Marital status:      Spouse name: Not on file    Number of children: Not on file    Years of education: Not on file    Highest education level: Not on file   Occupational History    Not on file   Tobacco Use    Smoking status: Never Smoker    Smokeless tobacco: Never Used   Vaping Use    Vaping Use: Never used   Substance and Sexual Activity    Alcohol use: Yes     Alcohol/week: 6.0 standard drinks     Types: 6 Cans of beer per week    Drug use: No    Sexual activity: Yes     Partners: Male   Other Topics Concern    Not on file   Social History Narrative    Not on file     Social Determinants of Health     Financial Resource Strain: Low Risk     Difficulty of Paying Living Expenses: Not very hard   Food Insecurity: No Food Insecurity    Worried About Running Out of Food in the Last Year: Never true    Bertha of Food in the Last Year: Never true   Transportation Needs:     Lack of Transportation (Medical):  Lack of Transportation (Non-Medical):    Physical Activity:     Days of Exercise per Week:     Minutes of Exercise per Session:    Stress:     Feeling of Stress :    Social Connections:     Frequency of Communication with Friends and Family:     Frequency of Social Gatherings with Friends and Family:     Attends Sabianist Services:     Active Member of Clubs or Organizations:     Attends Club or Organization Meetings:     Marital Status:    Intimate Partner Violence:     Fear of Current or Ex-Partner:     Emotionally Abused:     Physically Abused:     Sexually Abused:        Family History   Problem Relation Age of Onset    Cancer Father     Diabetes Sister     Other Sister         CHELSI    Stroke Mother     Cancer Brother         lung    Other Brother         CHELSI    Heart Disease Brother         angina       Vitals:    08/26/21 0914   BP: (!) 140/82   Pulse:    Resp:    Temp:    SpO2:        Wt Readings from Last 3 Encounters:   08/26/21 230 lb (104.3 kg)   03/10/21 225 lb (102.1 kg)   09/30/20 225 lb (102.1 kg)       Review of Systems   Constitutional: Negative for activity change and unexpected weight change. HENT: Negative for sore throat and trouble swallowing. Eyes: Negative for photophobia and visual disturbance. Respiratory: Negative. Cardiovascular: Negative. Gastrointestinal: Negative for blood in stool, constipation and diarrhea. Genitourinary: Negative for dysuria, frequency and hematuria. Musculoskeletal: Positive for arthralgias and myalgias. Fibro-  Achy and sore. Walks daily and stretches daily    Neurological: Negative for seizures, facial asymmetry, speech difficulty and numbness. Psychiatric/Behavioral: Negative for self-injury and suicidal ideas. Mood and affect stable and not acute -       Objective:   Physical Exam  Vitals reviewed. Constitutional:       Appearance: She is not ill-appearing. Eyes:      General: No scleral icterus. Extraocular Movements: Extraocular movements intact. Cardiovascular:      Rate and Rhythm: Normal rate and regular rhythm. Pulmonary:      Effort: Pulmonary effort is normal.      Breath sounds: Normal breath sounds. Abdominal:      General: Bowel sounds are normal. There is no distension. Palpations: There is no mass. Musculoskeletal:      Right lower leg: No edema. Left lower leg: No edema. Skin:     General: Skin is warm and dry. Neurological:      Mental Status: She is alert. Assessment:      Annual  Fibromyalgia  MDD-remission  OA        Plan:     Labs reviewed. Sugar is somewhat elevated but not in the diabetic range. Discussed moderation of intake of carbs and sweets. Lipids discussed. Robust level of HDL with a mildly elevated LDL above goal.  No change in medications at this time. Her depression is felt to be in remission. Functioning at a fairly high level. Doing generally OK   General inst / guidance   Meds discussed. Nonmedication strategies discussed. Patient is trying to be active and walk on a regular basis recommend she continue.     Follow-up 6 months    Current Outpatient Medications   Medication Sig Dispense Refill    zinc 50 MG CAPS Take by mouth      IODINE-VITAMIN A PO Take by mouth  pregabalin (LYRICA) 75 MG capsule TAKE 1 CAPSULE THREE TIMES DAILY 270 capsule 1    ASHWAGANDHA PO Take by mouth      omeprazole (PRILOSEC) 40 MG delayed release capsule TAKE 1 CAPSULE EVERY DAY 90 capsule 3    fluticasone (FLONASE) 50 MCG/ACT nasal spray USE 2 SPRAYS IN EACH NOSTRIL EVERY DAY 3 Bottle 2    DULoxetine (CYMBALTA) 20 MG extended release capsule TAKE 1 CAPSULE TWICE DAILY 180 capsule 3    Cholecalciferol (VITAMIN D3) 50 MCG (2000 UT) TABS Take by mouth      Loratadine (CLARITIN PO) Take 10 mg by mouth daily as needed        No current facility-administered medications for this visit.

## 2021-08-28 PROBLEM — F33.42 RECURRENT MAJOR DEPRESSIVE DISORDER, IN FULL REMISSION (HCC): Status: ACTIVE | Noted: 2021-08-28

## 2021-08-28 ASSESSMENT — ENCOUNTER SYMPTOMS
SORE THROAT: 0
PHOTOPHOBIA: 0
TROUBLE SWALLOWING: 0

## 2021-09-17 ENCOUNTER — TELEPHONE (OUTPATIENT)
Dept: FAMILY MEDICINE CLINIC | Age: 71
End: 2021-09-17

## 2021-09-17 NOTE — TELEPHONE ENCOUNTER
Pt called wanting to know about her Glucose # as it is creeping up and wants to know what Dr Malathi Norris thinks. Please advise. Pt aware  out today.

## 2021-09-17 NOTE — TELEPHONE ENCOUNTER
Asked Dany Apley to do some testing of her blood sugar before the evening meal.  If we can compare that number with her fasting numbers it may give us a lot of information regarding what to do next. I will have her collect those numbers for 3 or 4 weeks. 2 days fasting and 2 days before evening meal each week. If they are not looking good, we will get an A1c done to show us where the overall diabetic control number is.

## 2021-09-23 DIAGNOSIS — R73.01 ELEVATED FASTING GLUCOSE: Primary | ICD-10-CM

## 2021-09-23 NOTE — TELEPHONE ENCOUNTER
Spoke to the patient. Labs show a fasting blood sugar 118. Currently prediabetic range. We will get an A1c to assess further. Orders placed and patient will go to the lab.

## 2021-09-23 NOTE — TELEPHONE ENCOUNTER
Patient called again wants to discuss glucose . States she is not on any medication .  Patient concerned about recent labs showing they are elevated

## 2021-11-01 ENCOUNTER — E-VISIT (OUTPATIENT)
Dept: PRIMARY CARE CLINIC | Age: 71
End: 2021-11-01
Payer: MEDICARE

## 2021-11-01 ENCOUNTER — TELEPHONE (OUTPATIENT)
Dept: FAMILY MEDICINE CLINIC | Age: 71
End: 2021-11-01

## 2021-11-01 DIAGNOSIS — B96.89 ACUTE BACTERIAL SINUSITIS: Primary | ICD-10-CM

## 2021-11-01 DIAGNOSIS — J01.90 ACUTE BACTERIAL SINUSITIS: Primary | ICD-10-CM

## 2021-11-01 PROCEDURE — 99422 OL DIG E/M SVC 11-20 MIN: CPT | Performed by: INTERNAL MEDICINE

## 2021-11-01 RX ORDER — AMOXICILLIN AND CLAVULANATE POTASSIUM 875; 125 MG/1; MG/1
1 TABLET, FILM COATED ORAL 2 TIMES DAILY
Qty: 20 TABLET | Refills: 0 | Status: SHIPPED | OUTPATIENT
Start: 2021-11-01 | End: 2021-11-10 | Stop reason: SDUPTHER

## 2021-11-01 ASSESSMENT — LIFESTYLE VARIABLES: SMOKING_STATUS: NO, I'VE NEVER SMOKED

## 2021-11-01 NOTE — PROGRESS NOTES
You probably have a sinus infection. Take the Augmentin (antibiotic) as directed for 10 days. It will be sent to the pharmacy. Follow up with your primary care provider. 11 to 20 minutes were spent on this E visit.

## 2021-11-03 ENCOUNTER — NURSE TRIAGE (OUTPATIENT)
Dept: OTHER | Facility: CLINIC | Age: 71
End: 2021-11-03

## 2021-11-03 ENCOUNTER — OFFICE VISIT (OUTPATIENT)
Dept: FAMILY MEDICINE CLINIC | Age: 71
End: 2021-11-03
Payer: MEDICARE

## 2021-11-03 VITALS
SYSTOLIC BLOOD PRESSURE: 118 MMHG | DIASTOLIC BLOOD PRESSURE: 80 MMHG | HEIGHT: 63 IN | BODY MASS INDEX: 40.61 KG/M2 | OXYGEN SATURATION: 98 % | RESPIRATION RATE: 16 BRPM | HEART RATE: 74 BPM | WEIGHT: 229.2 LBS | TEMPERATURE: 97.1 F

## 2021-11-03 DIAGNOSIS — R22.0 RIGHT FACIAL SWELLING: Primary | ICD-10-CM

## 2021-11-03 PROCEDURE — G8399 PT W/DXA RESULTS DOCUMENT: HCPCS | Performed by: INTERNAL MEDICINE

## 2021-11-03 PROCEDURE — G8427 DOCREV CUR MEDS BY ELIG CLIN: HCPCS | Performed by: INTERNAL MEDICINE

## 2021-11-03 PROCEDURE — 1123F ACP DISCUSS/DSCN MKR DOCD: CPT | Performed by: INTERNAL MEDICINE

## 2021-11-03 PROCEDURE — 3017F COLORECTAL CA SCREEN DOC REV: CPT | Performed by: INTERNAL MEDICINE

## 2021-11-03 PROCEDURE — 1036F TOBACCO NON-USER: CPT | Performed by: INTERNAL MEDICINE

## 2021-11-03 PROCEDURE — 99214 OFFICE O/P EST MOD 30 MIN: CPT | Performed by: INTERNAL MEDICINE

## 2021-11-03 PROCEDURE — 1090F PRES/ABSN URINE INCON ASSESS: CPT | Performed by: INTERNAL MEDICINE

## 2021-11-03 PROCEDURE — G8417 CALC BMI ABV UP PARAM F/U: HCPCS | Performed by: INTERNAL MEDICINE

## 2021-11-03 PROCEDURE — 4040F PNEUMOC VAC/ADMIN/RCVD: CPT | Performed by: INTERNAL MEDICINE

## 2021-11-03 PROCEDURE — G8484 FLU IMMUNIZE NO ADMIN: HCPCS | Performed by: INTERNAL MEDICINE

## 2021-11-03 RX ORDER — FLUCONAZOLE 150 MG/1
150 TABLET ORAL ONCE
Qty: 2 TABLET | Refills: 0 | Status: SHIPPED | OUTPATIENT
Start: 2021-11-03 | End: 2021-11-03

## 2021-11-03 NOTE — TELEPHONE ENCOUNTER
Reason for Disposition   Swelling is painful to touch    Answer Assessment - Initial Assessment Questions  1. ONSET: \"When did the swelling start? \" (e.g., minutes, hours, days)      Onset was Monday after virtual visit    2. LOCATION: \"What part of the face is swollen? \"      Under jaw bone and down in to the throat (right side)    3. SEVERITY: \"How swollen is it? \"      Was the size of a golf ball yesterday - right next to jaw line    4. ITCHING: \"Is there any itching? \" If so, ask: \"How much? \"   (Scale 1-10; mild, moderate or severe)      Yes some mild itching    5. PAIN: \"Is the swelling painful to touch? \" If so, ask: \"How painful is it? \"   (Scale 1-10; mild, moderate or severe)      Tender to the touch - and some redness over the taking Advil    6. FEVER: \"Do you have a fever? \" If so, ask: \"What is it, how was it measured, and when did it start? \"       No fever    7. CAUSE: \"What do you think is causing the face swelling? \"      She is unsure but did bring up some dental work from early this year    6. RECURRENT SYMPTOM: \"Have you had face swelling before? \" If so, ask: \"When was the last time? \" \"What happened that time? \"      No    9. OTHER SYMPTOMS: \"Do you have any other symptoms? \" (e.g., toothache, leg swelling)      Post nasal drip (hx of allergies) - traveling tooth ache (sometimes hurst on the top and sometimes hurts on the bottom) ears itching    10. PREGNANCY: \"Is there any chance you are pregnant? \" \"When was your last menstrual period? \"        No    Protocols used: FACE SWELLING-ADULT-[place]    Received call from [address] at [facility] with Red Flag Complaint. Brief description of triage: see triage above - patient was seen by MD for symptoms on Nov 1 - since then she has developed swelling the size of a golf ball on her right jaw line    Triage indicates for patient to be seen today.     Care advice provided, patient verbalizes understanding; denies any other questions or concerns; instructed to call back for any new or worsening symptoms. Writer provided warm transfer to Brian Boo at [facility] for appointment scheduling. Attention Provider: Thank you for allowing me to participate in the care of your patient. The patient was connected to triage in response to information provided to the E[facility]. Please do not respond through this encounter as the response is not directed to a shared pool.

## 2021-11-05 ENCOUNTER — TELEPHONE (OUTPATIENT)
Dept: FAMILY MEDICINE CLINIC | Age: 71
End: 2021-11-05

## 2021-11-05 RX ORDER — DULOXETIN HYDROCHLORIDE 20 MG/1
CAPSULE, DELAYED RELEASE ORAL
Qty: 180 CAPSULE | Refills: 3 | Status: SHIPPED | OUTPATIENT
Start: 2021-11-05 | End: 2021-12-07 | Stop reason: SDUPTHER

## 2021-11-05 NOTE — TELEPHONE ENCOUNTER
----- Message from Darylene Glenn sent at 11/5/2021  8:15 AM EDT -----  Subject: Message to Provider    QUESTIONS  Information for Provider? Refill needed for Duloxetine (CYMBALTA) 20 MG   extended release capsule// 2 per day// 1 left , lost bottle//[id number] [place], [place] - [address]. Veronika [place] [phone] - F [phone]  ---------------------------------------------------------------------------  --------------  Libertad SANON  What is the best way for the office to contact you? OK to leave message on   voicemail  Preferred Call Back Phone Number? [phone]  ---------------------------------------------------------------------------  --------------  SCRIPT ANSWERS  Relationship to Patient?  Self

## 2021-11-05 NOTE — TELEPHONE ENCOUNTER
Last OV 11/3 Dr Dion Aldridge   Date Time Provider Vickey Rona   11/12/2021  9:20 AM [facility]  [facility]   7/14/2022 11:00 AM TOM Barba - CNP [facility]

## 2021-11-09 ENCOUNTER — TELEPHONE (OUTPATIENT)
Dept: FAMILY MEDICINE CLINIC | Age: 71
End: 2021-11-09

## 2021-11-09 NOTE — TELEPHONE ENCOUNTER
----- Message from Bonnie Waters sent at 11/9/2021 11:34 AM EST -----  Subject: Refill Request    QUESTIONS  Name of Medication? amoxicillin-clavulanate (AUGMENTIN) 875-125 MG per   tablet  Patient-reported dosage and instructions? Take 1 tablet by mouth 2 times   daily for 10 days Take with meals. How many days do you have left? 0  Preferred Pharmacy? [id number]  Pharmacy phone number (if available)? [phone]  Additional Information for Provider? Patient still has the same problems   with her gums and was wondering does she need more medicine, she also has   gotten a yeast infection from taking it and would also like a refill on   the fluconazole (DIFLUCAN) 150 MG tablet if she does need to continue   taking this medicine as well.  ---------------------------------------------------------------------------  --------------  CALL BACK INFO  What is the best way for the office to contact you? OK to leave message on   voicemail  Preferred Call Back Phone Number?  [phone]

## 2021-11-10 DIAGNOSIS — J01.90 ACUTE BACTERIAL SINUSITIS: ICD-10-CM

## 2021-11-10 DIAGNOSIS — B96.89 ACUTE BACTERIAL SINUSITIS: ICD-10-CM

## 2021-11-10 RX ORDER — AMOXICILLIN AND CLAVULANATE POTASSIUM 875; 125 MG/1; MG/1
1 TABLET, FILM COATED ORAL 2 TIMES DAILY
Qty: 20 TABLET | Refills: 0 | Status: SHIPPED | OUTPATIENT
Start: 2021-11-10 | End: 2021-11-20

## 2021-11-10 RX ORDER — FLUCONAZOLE 150 MG/1
TABLET ORAL
Qty: 2 TABLET | Refills: 0 | Status: SHIPPED | OUTPATIENT
Start: 2021-11-10 | End: 2022-09-27

## 2021-11-10 NOTE — TELEPHONE ENCOUNTER
Tell the patient that I refill the medicine. Follow-up next week if not doing much better.   If it is at dental issue she needs to see her dentist.

## 2021-11-12 ENCOUNTER — HOSPITAL ENCOUNTER (OUTPATIENT)
Dept: WOMENS IMAGING | Age: 71
Discharge: HOME | End: 2021-11-12
Payer: MEDICARE

## 2021-11-12 VITALS — HEIGHT: 63 IN | WEIGHT: 200 LBS | BODY MASS INDEX: 35.44 KG/M2

## 2021-11-12 DIAGNOSIS — Z12.31 ENCOUNTER FOR SCREENING MAMMOGRAM FOR BREAST CANCER: ICD-10-CM

## 2021-11-12 PROCEDURE — 77063 BREAST TOMOSYNTHESIS BI: CPT

## 2021-11-12 NOTE — PROGRESS NOTES
Rayne Echevarria (:  1950) is a 73 y.o. female,Established patient, here for evaluation of the following chief complaint(s):  Facial Swelling (right side jaw swelling X 3 days)        ASSESSMENT/PLAN:  1. Right facial swelling    - fluconazole (DIFLUCAN) 150 MG tablet; Take 1 tablet by mouth once for 1 dose May repeat in 3-5 days, if symptoms persist or recur. Dispense: 2 tablet; Refill: 0    Return if symptoms worsen  or  fail to improve       Subjective   SUBJECTIVE/OBJECTIVE:  HPI  CC- right facial swellimg  Patient came in stating that she had some right facial swelling for the past week and she pointed it to her right maxillary area which she said has currently subsided some she is she has been taking Augmentin for the past 3 days which was prescribed from her ED visit and she was diagnosed with the acute sinusitis from reviewing her chart. She also stated that she is concerned that she might get a yeast infection from taking antibiotics so I told her that I can prescribe Diflucan for a couple of days in case he gets the yeast infection. On further examination actually her right maxillary sinus and the swelling that she stated has substantially is supple has subsided and most of her tenderness when you examine on examination was mostly on her right lower gum. I asked her if she has had problem with her teeth and she has seen the dentist recently and she stated that she has had no recent problem but and I was mostly concerned that it was related to her teeth. I advised the patient that her problem is mostly dental and she might need to go see her dentist.  Since your ED has been taking the Augmentin and she has 7 more days to take and it obviously seem like it has helped because the swelling on her right maxillary area which she described was like a golf ball size has diminished substantially and the most of the tenderness as I mention is in the lower gum.   She also has some tenderness on the right cervical lymph nodes which is related to the infection of her right lower dome which I explained to her that that is what the problem is with the swelling of her cervical lymph node which she was kind of concerned with. She promised to go to the dentist if she has any more problem with the gum in the meantime I told her to continue the Augmentin and she does have the Diflucan that I will prescribe for her in case she gets a yeast infection from the antibiotics. Patient understands and hopefully she can go and have this checked out with her dentist, patient did not manifest any kind of sinus infection symptoms no fever no nasal drainage or cough  and her main problem is mostly related and her right lower gum. I did review her chart and she did have an ED visit which was diagnosed as acute sinusitis from what she described to the provider and where she had the prescription for the Augmentin for 10 days . Review of Systems-Unremakable except for what is noted in the HPI       Objective   Physical Exam  Vitals reviewed. Constitutional:       Appearance: Normal appearance. HENT:      Head: Normocephalic. Nose: Nose normal.      Comments: Tender lower gum on palpatiom     Mouth/Throat:      Mouth: Mucous membranes are moist.   Eyes:      Pupils: Pupils are equal, round, and reactive to light. Neck:      Comments: Right cervical lymph nodes tender on palpatiomn  Cardiovascular:      Rate and Rhythm: Normal rate. Pulmonary:      Effort: Pulmonary effort is normal.   Abdominal:      General: Abdomen is flat. Musculoskeletal:         General: Normal range of motion. Cervical back: Normal range of motion. Tenderness present. Skin:     General: Skin is warm and dry. Neurological:      General: No focal deficit present. Mental Status: She is alert.    Psychiatric:         Mood and Affect: Mood normal.     Instruction:  -continue augmentin  -make appt  With dentist, may need x-rays of her right lower gum    Follow up with dentist and Dr. Clayton Clay      Reviewed previous notes, tests results and face to face with the patient discussing the diagnosis and importance  of compliance with the treatments as well as documenting on the day of visit. Answered questions and encouraged to call  for any other concerns. An electronic signature was used to authenticate this note.     --Brenda Lopez MD

## 2021-12-01 ENCOUNTER — TELEPHONE (OUTPATIENT)
Dept: PULMONOLOGY | Age: 71
End: 2021-12-01

## 2021-12-01 NOTE — TELEPHONE ENCOUNTER
Patient calling needing some help with changing modem over to new machine. Please call patient at [phone].

## 2021-12-02 ENCOUNTER — TELEPHONE (OUTPATIENT)
Dept: PULMONOLOGY | Age: 71
End: 2021-12-02

## 2021-12-02 NOTE — TELEPHONE ENCOUNTER
Pt calling to see if we are receiving her modem information. It is currently not working . Will recheck later.

## 2022-04-06 ENCOUNTER — OFFICE VISIT (OUTPATIENT)
Dept: FAMILY MEDICINE CLINIC | Age: 72
End: 2022-04-06
Payer: MEDICARE

## 2022-04-06 VITALS
SYSTOLIC BLOOD PRESSURE: 122 MMHG | DIASTOLIC BLOOD PRESSURE: 84 MMHG | HEIGHT: 63 IN | HEART RATE: 79 BPM | BODY MASS INDEX: 39.87 KG/M2 | OXYGEN SATURATION: 95 % | WEIGHT: 225 LBS

## 2022-04-06 DIAGNOSIS — M79.7 FIBROMYALGIA: Primary | ICD-10-CM

## 2022-04-06 DIAGNOSIS — E66.01 SEVERE OBESITY (BMI 35.0-39.9) WITH COMORBIDITY (HCC): ICD-10-CM

## 2022-04-06 DIAGNOSIS — F33.42 RECURRENT MAJOR DEPRESSIVE DISORDER, IN FULL REMISSION (HCC): ICD-10-CM

## 2022-04-06 PROCEDURE — 99213 OFFICE O/P EST LOW 20 MIN: CPT | Performed by: FAMILY MEDICINE

## 2022-04-06 PROCEDURE — G8417 CALC BMI ABV UP PARAM F/U: HCPCS | Performed by: FAMILY MEDICINE

## 2022-04-06 PROCEDURE — 1123F ACP DISCUSS/DSCN MKR DOCD: CPT | Performed by: FAMILY MEDICINE

## 2022-04-06 PROCEDURE — 3017F COLORECTAL CA SCREEN DOC REV: CPT | Performed by: FAMILY MEDICINE

## 2022-04-06 PROCEDURE — 1036F TOBACCO NON-USER: CPT | Performed by: FAMILY MEDICINE

## 2022-04-06 PROCEDURE — 1090F PRES/ABSN URINE INCON ASSESS: CPT | Performed by: FAMILY MEDICINE

## 2022-04-06 PROCEDURE — G8427 DOCREV CUR MEDS BY ELIG CLIN: HCPCS | Performed by: FAMILY MEDICINE

## 2022-04-06 PROCEDURE — G8399 PT W/DXA RESULTS DOCUMENT: HCPCS | Performed by: FAMILY MEDICINE

## 2022-04-06 PROCEDURE — 4040F PNEUMOC VAC/ADMIN/RCVD: CPT | Performed by: FAMILY MEDICINE

## 2022-04-06 RX ORDER — GABAPENTIN 300 MG/1
300 CAPSULE ORAL 2 TIMES DAILY
Qty: 60 CAPSULE | Refills: 1 | Status: SHIPPED | OUTPATIENT
Start: 2022-04-06 | End: 2022-06-01 | Stop reason: SDUPTHER

## 2022-04-06 ASSESSMENT — ENCOUNTER SYMPTOMS: RESPIRATORY NEGATIVE: 1

## 2022-04-06 NOTE — PATIENT INSTRUCTIONS
Change the lyrica to Gabapentin    Start with one dose a day and then increase to twice a day if not sedation or side effects    Call Dr Elo Hensley -  About your hand.

## 2022-04-06 NOTE — PROGRESS NOTES
Subjective:      Patient ID: Yasmine Boyd is a 73 y.o. y.o. female. Following up several things  Cyst left thenar area / thumb  Tremor- hands,  Weakness also-  Right hand especially  Right foot- plantar fasciitis flared. -  iontophoresis  ? Lyrica for the fibro-  Questions weight gain. Questions gabapentin-  Not as much weight. HPI      Chief Complaint   Patient presents with    Cyst     left hand    Other     indent on right hand     Foot Pain     Plantar Fascitis     Discuss Medications     Lyrica        Allergies   Allergen Reactions    Cortisone Acetate Other (See Comments)     agitation       Past Medical History:   Diagnosis Date    Anxiety     Arthritis     knee    Back pain     Depression     Fibromyalgia     Hemorrhoids     Obesity     PONV (postoperative nausea and vomiting)     Reflux     Sleep apnea     uses CPAP    TMJ syndrome     Vision impairment     glasses       Past Surgical History:   Procedure Laterality Date    BREAST SURGERY      reduction    BROW LIFT Bilateral 11/12/13    FINGER TRIGGER RELEASE  12/9/11    left thumb and cyst    FINGER TRIGGER RELEASE Bilateral     4th finger    FINGER TRIGGER RELEASE Right 7/6/2015    index and long    FINGER TRIGGER RELEASE Left 2/22/16    FOOT SURGERY  1980's    Bilat.  feet /toes shaved\"    JOINT REPLACEMENT Right 11/09/2016    knee    JOINT REPLACEMENT Left 10/09/2017    knee    KNEE ARTHROSCOPY  11-    Left knee video arthroscopy, partial medial and lateral meniscectomy    KNEE SURGERY  3/17/2011    Video arthroscopy withpartial medial menisectomy rt. knee    MOUTH SURGERY      OTHER SURGICAL HISTORY Left     dequervains    OTHER SURGICAL HISTORY  10/09/2017    LEFT TOTAL KNEE ARTHROPLASTY              TOTAL KNEE ARTHROPLASTY Right 11/09/2016    TUBAL LIGATION         Social History     Socioeconomic History    Marital status:      Spouse name: Not on file    Number of children: Not on file    Years of education: Not on file    Highest education level: Not on file   Occupational History    Not on file   Tobacco Use    Smoking status: Never Smoker    Smokeless tobacco: Never Used   Vaping Use    Vaping Use: Never used   Substance and Sexual Activity    Alcohol use: Yes     Alcohol/week: 6.0 standard drinks     Types: 6 Cans of beer per week    Drug use: No    Sexual activity: Yes     Partners: Male   Other Topics Concern    Not on file   Social History Narrative    Not on file     Social Determinants of Health     Financial Resource Strain: Low Risk     Difficulty of Paying Living Expenses: Not very hard   Food Insecurity: No Food Insecurity    Worried About Running Out of Food in the Last Year: Never true    Bertha of Food in the Last Year: Never true   Transportation Needs:     Lack of Transportation (Medical): Not on file    Lack of Transportation (Non-Medical):  Not on file   Physical Activity:     Days of Exercise per Week: Not on file    Minutes of Exercise per Session: Not on file   Stress:     Feeling of Stress : Not on file   Social Connections:     Frequency of Communication with Friends and Family: Not on file    Frequency of Social Gatherings with Friends and Family: Not on file    Attends Buddhism Services: Not on file    Active Member of [address] [place] [address] [employer] or Organizations: Not on file    Attends Club or Organization Meetings: Not on file    Marital Status: Not on file   Intimate Partner Violence:     Fear of Current or Ex-Partner: Not on file    Emotionally Abused: Not on file    Physically Abused: Not on file    Sexually Abused: Not on file   Housing Stability: Unknown    Unable to Pay for Housing in the Last Year: No    Number of Places Lived in the Last Year: Not on file    Unstable Housing in the Last Year: No       Family History   Problem Relation Age of Onset    Cancer Father     Diabetes Sister     Other Sister         CHELSI    Stroke Mother     Cancer Brother lung    Other Brother         CHELSI    Heart Disease Brother         angina       Vitals:    04/06/22 0924   BP: 122/84   Pulse: 79   SpO2: 95%       Wt Readings from Last 3 Encounters:   04/06/22 225 lb (102.1 kg)   11/12/21 200 lb (90.7 kg)   11/03/21 229 lb 3.2 oz (104 kg)       Review of Systems   Respiratory: Negative. Cardiovascular: Negative. Skin:        Derm gave her script for topical chemo-  Has not started yet   Neurological: Positive for tremors. Variable,  Hands  Head neg    intention       Objective:   Physical Exam  Constitutional:       Appearance: She is obese. She is not ill-appearing. Eyes:      General: No scleral icterus. Conjunctiva/sclera: Conjunctivae normal.   Cardiovascular:      Rate and Rhythm: Normal rate and regular rhythm. Heart sounds: Normal heart sounds. Pulmonary:      Effort: Pulmonary effort is normal.      Breath sounds: Normal breath sounds. Musculoskeletal:      Comments: Intention tremor- mild  Hands- head neg     some weak    Cyst thenar eminence -left   Lymphadenopathy:      Cervical: No cervical adenopathy. Neurological:      Mental Status: She is alert and oriented to person, place, and time. Psychiatric:         Mood and Affect: Mood normal.         Behavior: Behavior normal.         Assessment:      Depressive disorder, treated, partial remission  Fibromyalgia  GERD  Essential tremor  Cyst, soft tissue, thumb        Plan:   Tremor discussed-  Observe for now. inst    Hand surgery referral.  Dr Ishan Thomas- has seen him before and will ask him to comment on the cyst.    Patient is questioning the Lyrica effectiveness for fibro-. Change lyrica to Gabapentin-  300 bid-may need to titrate  Discussed similarities between gabapentin and Lyrica and some nonsimilarities. Expectations for the use of gabapentin discussed. The patient will follow over the next 4-8 weeks depending on her response to gabapentin.     Current Outpatient Medications   Medication Sig Dispense Refill    omeprazole (PRILOSEC) 40 MG delayed release capsule TAKE 1 CAPSULE EVERY DAY 90 capsule 3    fluticasone (FLONASE) 50 MCG/ACT nasal spray USE 2 SPRAYS IN EACH NOSTRIL EVERY DAY 3 each 3    pregabalin (LYRICA) 75 MG capsule TAKE 1 CAPSULE THREE TIMES DAILY 270 capsule 1    DULoxetine (CYMBALTA) 20 MG extended release capsule TAKE 1 CAPSULE TWICE DAILY 180 capsule 3    fluconazole (DIFLUCAN) 150 MG tablet Take one tablet orally for yeast infection. May repeat dose in 3-4 days if needed 2 tablet 0    zinc 50 MG CAPS Take by mouth      IODINE-VITAMIN A PO Take by mouth      Cholecalciferol (VITAMIN D3) 50 MCG (2000 UT) TABS Take by mouth      Loratadine (CLARITIN PO) Take 10 mg by mouth daily as needed        No current facility-administered medications for this visit.

## 2022-04-08 ENCOUNTER — TELEPHONE (OUTPATIENT)
Dept: FAMILY MEDICINE CLINIC | Age: 72
End: 2022-04-08

## 2022-04-11 DIAGNOSIS — M79.7 FIBROMYALGIA: ICD-10-CM

## 2022-04-11 RX ORDER — GABAPENTIN 300 MG/1
300 CAPSULE ORAL 2 TIMES DAILY
Qty: 60 CAPSULE | Refills: 1 | Status: CANCELLED | OUTPATIENT
Start: 2022-04-11 | End: 2022-06-10

## 2022-04-11 NOTE — TELEPHONE ENCOUNTER
Spoke to the patient. Clarified the dosing is 1 twice a day, but she is to start just at night until she sees if it makes her too drowsy or not. She has a refill to make the \"60-day\" marker on the prescription. If she tolerates this we will then get her a 90-day prescription.

## 2022-06-01 DIAGNOSIS — M79.7 FIBROMYALGIA: ICD-10-CM

## 2022-06-01 RX ORDER — GABAPENTIN 300 MG/1
300 CAPSULE ORAL 2 TIMES DAILY
Qty: 60 CAPSULE | Refills: 6 | Status: SHIPPED | OUTPATIENT
Start: 2022-06-01 | End: 2022-09-27 | Stop reason: DRUGHIGH

## 2022-06-01 NOTE — TELEPHONE ENCOUNTER
Patient needs a refill ongabapentin (NEURONTIN) 300 MG capsule       They need a 90day supply.          [facility]: [facility]

## 2022-06-01 NOTE — TELEPHONE ENCOUNTER
Future Appointments   Date Time Provider Vickey Rea   7/14/2022 11:00 AM TOM Moreno Cea SLEEP [facility] 4/6/2022

## 2022-07-12 ENCOUNTER — TELEPHONE (OUTPATIENT)
Dept: PULMONOLOGY | Age: 72
End: 2022-07-12

## 2022-07-12 NOTE — TELEPHONE ENCOUNTER
Patient returning call, no notes left and she could not hear VM so I am not sure what it was in regards to. Let her know we will check and see if her modem is connected, if we need anything from her we will call her back and let her know, [phone].

## 2022-07-13 NOTE — TELEPHONE ENCOUNTER
Called patient and left a message to call us back in case she needs anything, but the message she could hear was to confirmed her appt on 07/13/22

## 2022-07-14 ENCOUNTER — TELEMEDICINE (OUTPATIENT)
Dept: PULMONOLOGY | Age: 72
End: 2022-07-14
Payer: MEDICARE

## 2022-07-14 DIAGNOSIS — M79.7 FIBROMYALGIA: Chronic | ICD-10-CM

## 2022-07-14 DIAGNOSIS — G47.33 OBSTRUCTIVE SLEEP APNEA: Primary | ICD-10-CM

## 2022-07-14 DIAGNOSIS — E66.01 CLASS 3 SEVERE OBESITY DUE TO EXCESS CALORIES WITHOUT SERIOUS COMORBIDITY WITH BODY MASS INDEX (BMI) OF 40.0 TO 44.9 IN ADULT (HCC): ICD-10-CM

## 2022-07-14 DIAGNOSIS — F33.42 RECURRENT MAJOR DEPRESSIVE DISORDER, IN FULL REMISSION (HCC): ICD-10-CM

## 2022-07-14 PROCEDURE — 99442 PR PHYS/QHP TELEPHONE EVALUATION 11-20 MIN: CPT | Performed by: NURSE PRACTITIONER

## 2022-07-14 ASSESSMENT — SLEEP AND FATIGUE QUESTIONNAIRES
HOW LIKELY ARE YOU TO NOD OFF OR FALL ASLEEP WHEN YOU ARE A PASSENGER IN A CAR FOR AN HOUR WITHOUT A BREAK: 0
ESS TOTAL SCORE: 1
HOW LIKELY ARE YOU TO NOD OFF OR FALL ASLEEP WHILE LYING DOWN TO REST IN THE AFTERNOON WHEN CIRCUMSTANCES PERMIT: 0
HOW LIKELY ARE YOU TO NOD OFF OR FALL ASLEEP IN A CAR, WHILE STOPPED FOR A FEW MINUTES IN TRAFFIC: 0
HOW LIKELY ARE YOU TO NOD OFF OR FALL ASLEEP WHILE SITTING INACTIVE IN A PUBLIC PLACE: 0
HOW LIKELY ARE YOU TO NOD OFF OR FALL ASLEEP WHILE SITTING QUIETLY AFTER LUNCH WITHOUT ALCOHOL: 0
HOW LIKELY ARE YOU TO NOD OFF OR FALL ASLEEP WHILE SITTING AND READING: 0
HOW LIKELY ARE YOU TO NOD OFF OR FALL ASLEEP WHILE SITTING AND TALKING TO SOMEONE: 0
HOW LIKELY ARE YOU TO NOD OFF OR FALL ASLEEP WHILE WATCHING TV: 1

## 2022-07-14 NOTE — PROGRESS NOTES
Diagnosis: [x] CHELSI (G47.33) [] CSA (G47.31) [] Apnea (G47.30)   Length of Need: [x] 15 Months [] 99 Months [] Other:   Machine (EMORY!): [x] Respironics Dream Station 2      Auto [] ResMed AirSense     Auto [] Other:     []  CPAP () [] Bilevel ()   Mode: [] Auto [] Spontaneous    Mode: [] Auto [] Spontaneous              Comfort Settings:      Humidifier: [] Heated ()        [x] Water chamber replacement ()/ 1 per 6 months        Mask:   [x] Nasal () /1 per 3 months [] Full Face () /1 per 3 months   [x] Patient choice -Size and fit mask [] Patient Choice - Size and fit mask   [] Dispense: [] Dispense:   [x] Headgear () / 1 per 3 months [] Headgear () / 1 per 3 months   [] Replacement Nasal Cushion ()/2 per month [] Interface Replacement ()/1 per month   [x] Replacement Nasal Pillows ()/2 per month         Tubing: [x] Heated ()/1 per 3 months    [] Standard ()/1 per 3 months [] Other:           Filters: [x] Non-disposable ()/1 per 6 months     [x] Ultra-Fine, Disposable ()/2 per month        Miscellaneous: [] Chin Strap ()/ 1 per 6 months [] O2 bleed-in:        LPM   [] Oxymetry on CPAP/Bilevel []  Other:         Start Order Date: 07/14/22    MEDICAL JUSTIFICATION:  I, the undersigned, certify that the above prescribed supplies are medically necessary for this patients wellbeing. In my opinion, the supplies are both reasonable and necessary in reference to accepted standards of medicalpractice in treatment of this patients condition. Kayli Miranda NP    NPI: [id number]       Order Signed Date: 07/14/22  [address] [facility]  Pulmonary, Sleep, and Critical Care    Pulmonary, Sleep, and Critical Care  [facility] [place] [address]  Suite [facility], [address] [facility] , [address]                                    Lakeisha Yasmani Nugent Velvet  Phone: [phone]    Fax:

## 2022-07-14 NOTE — PROGRESS NOTES
Paula Ramachandran MD, Jacquelyn Tobar, [facility]  [facility] HEART AND SURGICAL [facility] CNP  Kaelyn Or CNP [address]  [address] [facility], [facility]- [phone]   [facility] [address] Jada Mckinnon. [address] [place] [address]. Amanda [address] [phone]         Sleep Medicine Telephone Visit    Pursuant to the emergency declaration under the [facility], [facility] waiver authority and the Coronavirus Preparedness and [employer] Act this Telephone Visit was insisted, with patient's consent, to reduce the patient's risk of exposure to COVID-19 and provide continuity of care for an established patient. Services were provided through a synchronous discussion over a telephone to substitute for in-person clinic visit. Patient was located in their house. Assessment/Plan:     1. Obstructive sleep apnea  Assessment & Plan:   Chronic-Stable: Reviewed and analyzed results of physiologic download from patient's machine and reviewed with patient. Supplies and parts as needed for her machine. These are medically necessary. Limit caffeine use after 3pm. Based on the analyzed data will continue with current settings. Stable on her machine at current settings, getting benefit from the use, and having minimal side effects. Events increased during the month of May while patient was traveling and had sinus congestion. Data report pulled over the last 30 days and AHI has improved and is 2.8 /hr. Encouraged her to work with her equipment company on mask comfort. Provided resources for her to view different styles of masks. Discussed changing Flonase regimen to 1 spray each nostril BID to see if this will provide better control of Rhinorrhea. Will send prescription for supplies to [employer] per patient request. Will see her back in 1 year. Encouraged her to call the office with any questions or concerns. 2. Recurrent major depressive disorder, in full remission ([facility] [address]  Assessment & Plan:  Chronic- Stable. Discussed the importance of treating obstructive sleep apnea as part of the management of this disorder. Cont any meds per PCP and other physicians. 3. Fibromyalgia  Assessment & Plan:  Chronic- Stable. Discussed the importance of treating obstructive sleep apnea as part of the management of this disorder. Cont any meds per PCP and other physicians. 4. Class 3 severe obesity due to excess calories without serious comorbidity with body mass index (BMI) of 40.0 to 44.9 in adult [facility])  Assessment & Plan:   Chronic-not stable:  Discussed importance of treating obstructive sleep apnea and getting sufficient sleep to assist with weight control. Encouraged her to work on weight loss through diet and exercise. Recommended DASH or Mediterranean diets. Start time: 11: 00 a.m. Stop time: 11: 17 a.m. Visit duration:  [] 5-10 Min ([id number])      [x] 11-20 min (82964)   [] 21-30 Min (71103)    Subjective:     Patient ID: Claire Weller is a 73 y.o. female. Subjective   HPI:    Machine Modem/Download Info:  Compliance (hours/night): 10.17 hrs/night  Download AHI (/hour): 7.1 /HR  Average CPAP Pressure : 15.2 cmH2O     APAP - Settings  Pressure Min: 10 cmH2O  Pressure Max: 16 cmH2O       Comfort Settings  Humidity Level (0-8): 3  Flex/EPR (0-3): 2 PAP Mask  Mask Type: Nasal pillows     CHELSI:    Claire Weller reports she is doing well with her machine. She has received her replacement machine for the  recall. She traveled the whole month of May out [facility] and had a lot of nasal congestion which has resolved. Sometimes has trouble with rhinorrhea. She is taking Fluticasone and Claritin with minimal relief in her symptoms. The pressure on her machine is comfortable and she is waking rested. She will toss and turn during the night due to fibromyalgia pain but is usually able to fall back asleep within a reasonable time.   she denies headaches, congestion, nosebleeds, dryness, aerophagia, or drowsiness while driving. She has been having trouble getting supplies from [facility]. She would like to change to [place]. She may like to try a different style of mask. [address] Changing to [employer]    [place]: Total score: 1    Social History     Socioeconomic History    Marital status:      Spouse name: Not on file    Number of children: Not on file    Years of education: Not on file    Highest education level: Not on file   Occupational History    Not on file   Tobacco Use    Smoking status: Never Smoker    Smokeless tobacco: Never Used   Vaping Use    Vaping Use: Never used   Substance and Sexual Activity    Alcohol use: Yes     Alcohol/week: 6.0 standard drinks     Types: 6 Cans of beer per week    Drug use: No    Sexual activity: Yes     Partners: Male   Other Topics Concern    Not on file   Social History Narrative    Not on file     Social Determinants of Health     Financial Resource Strain: Low Risk     Difficulty of Paying Living Expenses: Not very hard   Food Insecurity: No Food Insecurity    Worried About Running Out of Food in the Last Year: Never true    Bertha of Food in the Last Year: Never true   Transportation Needs:     Lack of Transportation (Medical): Not on file    Lack of Transportation (Non-Medical):  Not on file   Physical Activity:     Days of Exercise per Week: Not on file    Minutes of Exercise per Session: Not on file   Stress:     Feeling of Stress : Not on file   Social Connections:     Frequency of Communication with Friends and Family: Not on file    Frequency of Social Gatherings with Friends and Family: Not on file    Attends Church Services: Not on file    Active Member of Clubs or Organizations: Not on file    Attends Club or Organization Meetings: Not on file    Marital Status: Not on file   Intimate Partner Violence:     Fear of Current or Ex-Partner: Not on file    Emotionally Abused: Not on file    Physically Abused: Not on file    Sexually Abused: Not on file   Housing Stability: Unknown    Unable to Pay for Housing in the Last Year: No    Number of Places Lived in the Last Year: Not on file    Unstable Housing in the Last Year: No       Current Outpatient Medications   Medication Sig Dispense Refill    gabapentin (NEURONTIN) 300 MG capsule Take 1 capsule by mouth 2 times daily for 210 days. Intended supply: 90 days 60 capsule 6    omeprazole (PRILOSEC) 40 MG delayed release capsule TAKE 1 CAPSULE EVERY DAY 90 capsule 3    fluticasone (FLONASE) 50 MCG/ACT nasal spray USE 2 SPRAYS IN EACH NOSTRIL EVERY DAY 3 each 3    DULoxetine (CYMBALTA) 20 MG extended release capsule TAKE 1 CAPSULE TWICE DAILY 180 capsule 3    fluconazole (DIFLUCAN) 150 MG tablet Take one tablet orally for yeast infection. May repeat dose in 3-4 days if needed 2 tablet 0    zinc 50 MG CAPS Take by mouth      IODINE-VITAMIN A PO Take by mouth      Cholecalciferol (VITAMIN D3) 50 MCG (2000 UT) TABS Take by mouth      Loratadine (CLARITIN PO) Take 10 mg by mouth daily as needed        No current facility-administered medications for this visit.        Allergies as of 07/14/2022 - Fully Reviewed 07/14/2022   Allergen Reaction Noted    Cortisone acetate Other (See Comments) 11/18/2010       Patient Active Problem List   Diagnosis    Fibromyalgia    Seasonal allergies    Trigger finger    Depression    Osteoarthritis of both knees    Trigger middle finger of left hand    Plantar fasciitis, right    Primary osteoarthritis of right knee    Status post total right knee replacement    Obstructive sleep apnea    Class 3 severe obesity without serious comorbidity with body mass index (BMI) of 40.0 to 44.9 in adult [facility])    Recurrent major depressive disorder, in full remission ([facility] [address]    Acute bacterial sinusitis       Past Medical History:   Diagnosis Date    Anxiety     Arthritis     knee    Back pain     Depression     Fibromyalgia     Hemorrhoids     Obesity     PONV (postoperative nausea and vomiting)     Reflux     Sleep apnea     uses CPAP    TMJ syndrome     Vision impairment     glasses       Past Surgical History:   Procedure Laterality Date    BREAST SURGERY      reduction    BROW LIFT Bilateral 11/12/13    FINGER TRIGGER RELEASE  12/9/11    left thumb and cyst    FINGER TRIGGER RELEASE Bilateral     4th finger    FINGER TRIGGER RELEASE Right 7/6/2015    index and long    FINGER TRIGGER RELEASE Left 2/22/16    FOOT SURGERY  1980's    Bilat.  feet /toes shaved\"    JOINT REPLACEMENT Right 11/09/2016    knee    JOINT REPLACEMENT Left 10/09/2017    knee    KNEE ARTHROSCOPY  11-    Left knee video arthroscopy, partial medial and lateral meniscectomy    KNEE SURGERY  3/17/2011    Video arthroscopy withpartial medial menisectomy rt. knee    MOUTH SURGERY      OTHER SURGICAL HISTORY Left     dequervains    OTHER SURGICAL HISTORY  10/09/2017    LEFT TOTAL KNEE ARTHROPLASTY              TOTAL KNEE ARTHROPLASTY Right 11/09/2016    TUBAL LIGATION         Family History   Problem Relation Age of Onset    Cancer Father     Diabetes Sister     Other Sister         CHELSI    Stroke Mother     Cancer Brother         lung    Other Brother         CHELSI    Heart Disease Brother         angina           Electronically signed by TOM Valencia on 7/14/2022 at 11:26 AM

## 2022-07-14 NOTE — ASSESSMENT & PLAN NOTE
Chronic-Stable: Reviewed and analyzed results of physiologic download from patient's machine and reviewed with patient. Supplies and parts as needed for her machine. These are medically necessary. Limit caffeine use after 3pm. Based on the analyzed data will continue with current settings. Stable on her machine at current settings, getting benefit from the use, and having minimal side effects. Events increased during the month of May while patient was traveling and had sinus congestion. Data report pulled over the last 30 days and AHI has improved and is 2.8 /hr. Encouraged her to work with her equipment company on mask comfort. Provided resources for her to view different styles of masks. Discussed changing Flonase regimen to 1 spray each nostril BID to see if this will provide better control of Rhinorrhea. Will send prescription for supplies to [employer] per patient request. Will see her back in 1 year. Encouraged her to call the office with any questions or concerns.

## 2022-07-14 NOTE — LETTER
[facility]  [place] [address] [place] [address]  Phone: [phone]  Fax: [phone]    July 14, 2022       Patient: Caridad Acuna   MR Number: [id number]   YOB: 1950   Date of Visit: 7/14/2022       Caridad Acuna was seen for a follow up visit today. Here is my assessment and plan as well as an attached copy of her visit today:    Recurrent major depressive disorder, in full remission ([address]  Chronic- Stable. Discussed the importance of treating obstructive sleep apnea as part of the management of this disorder. Cont any meds per PCP and other physicians. Fibromyalgia  Chronic- Stable. Discussed the importance of treating obstructive sleep apnea as part of the management of this disorder. Cont any meds per PCP and other physicians. Class 3 severe obesity without serious comorbidity with body mass index (BMI) of 40.0 to 44.9 in adult [facility])   Chronic-not stable:  Discussed importance of treating obstructive sleep apnea and getting sufficient sleep to assist with weight control. Encouraged her to work on weight loss through diet and exercise. Recommended DASH or Mediterranean diets. Obstructive sleep apnea   Chronic-Stable: Reviewed and analyzed results of physiologic download from patient's machine and reviewed with patient. Supplies and parts as needed for her machine. These are medically necessary. Limit caffeine use after 3pm. Based on the analyzed data will continue with current settings. Stable on her machine at current settings, getting benefit from the use, and having minimal side effects. Events increased during the month of May while patient was traveling and had sinus congestion. Data report pulled over the last 30 days and AHI has improved and is 2.8 /hr. Encouraged her to work with her equipment company on mask comfort. Provided resources for her to view different styles of masks.  Discussed changing Flonase regimen to 1 spray each nostril BID to see if this will provide better control of Rhinorrhea. Will send prescription for supplies to [facility] per patient request. Will see her back in 1 year. Encouraged her to call the office with any questions or concerns. If you have questions or concerns, please do not hesitate to call me. I look forward to following [facility] along with you.     Sincerely,    TOM Shaffer    CC providers:  DO Sherwin Garcia  [place] [place]

## 2022-09-27 ENCOUNTER — OFFICE VISIT (OUTPATIENT)
Dept: FAMILY MEDICINE CLINIC | Age: 72
End: 2022-09-27
Payer: MEDICARE

## 2022-09-27 VITALS
WEIGHT: 219 LBS | TEMPERATURE: 97.1 F | BODY MASS INDEX: 38.79 KG/M2 | SYSTOLIC BLOOD PRESSURE: 138 MMHG | HEART RATE: 75 BPM | OXYGEN SATURATION: 98 % | DIASTOLIC BLOOD PRESSURE: 82 MMHG | RESPIRATION RATE: 16 BRPM

## 2022-09-27 DIAGNOSIS — Z00.00 MEDICARE ANNUAL WELLNESS VISIT, SUBSEQUENT: Primary | ICD-10-CM

## 2022-09-27 DIAGNOSIS — M79.7 FIBROMYALGIA: ICD-10-CM

## 2022-09-27 DIAGNOSIS — F33.42 RECURRENT MAJOR DEPRESSIVE DISORDER, IN FULL REMISSION (HCC): ICD-10-CM

## 2022-09-27 PROCEDURE — G8399 PT W/DXA RESULTS DOCUMENT: HCPCS | Performed by: FAMILY MEDICINE

## 2022-09-27 PROCEDURE — 3017F COLORECTAL CA SCREEN DOC REV: CPT | Performed by: FAMILY MEDICINE

## 2022-09-27 PROCEDURE — G0439 PPPS, SUBSEQ VISIT: HCPCS | Performed by: FAMILY MEDICINE

## 2022-09-27 PROCEDURE — G8417 CALC BMI ABV UP PARAM F/U: HCPCS | Performed by: FAMILY MEDICINE

## 2022-09-27 PROCEDURE — 99213 OFFICE O/P EST LOW 20 MIN: CPT | Performed by: FAMILY MEDICINE

## 2022-09-27 PROCEDURE — G8427 DOCREV CUR MEDS BY ELIG CLIN: HCPCS | Performed by: FAMILY MEDICINE

## 2022-09-27 PROCEDURE — 1090F PRES/ABSN URINE INCON ASSESS: CPT | Performed by: FAMILY MEDICINE

## 2022-09-27 PROCEDURE — 1123F ACP DISCUSS/DSCN MKR DOCD: CPT | Performed by: FAMILY MEDICINE

## 2022-09-27 PROCEDURE — 1036F TOBACCO NON-USER: CPT | Performed by: FAMILY MEDICINE

## 2022-09-27 RX ORDER — GABAPENTIN 300 MG/1
300 CAPSULE ORAL 3 TIMES DAILY
Qty: 270 CAPSULE | Refills: 1 | Status: SHIPPED | OUTPATIENT
Start: 2022-09-27 | End: 2023-03-26

## 2022-09-27 RX ORDER — M-VIT,TX,IRON,MINS/CALC/FOLIC 27MG-0.4MG
1 TABLET ORAL DAILY
COMMUNITY

## 2022-09-27 SDOH — ECONOMIC STABILITY: FOOD INSECURITY: WITHIN THE PAST 12 MONTHS, YOU WORRIED THAT YOUR FOOD WOULD RUN OUT BEFORE YOU GOT MONEY TO BUY MORE.: NEVER TRUE

## 2022-09-27 SDOH — ECONOMIC STABILITY: INCOME INSECURITY: IN THE LAST 12 MONTHS, WAS THERE A TIME WHEN YOU WERE NOT ABLE TO PAY THE MORTGAGE OR RENT ON TIME?: NO

## 2022-09-27 SDOH — ECONOMIC STABILITY: FOOD INSECURITY: WITHIN THE PAST 12 MONTHS, THE FOOD YOU BOUGHT JUST DIDN'T LAST AND YOU DIDN'T HAVE MONEY TO GET MORE.: NEVER TRUE

## 2022-09-27 ASSESSMENT — PATIENT HEALTH QUESTIONNAIRE - PHQ9
4. FEELING TIRED OR HAVING LITTLE ENERGY: 0
SUM OF ALL RESPONSES TO PHQ9 QUESTIONS 1 & 2: 2
SUM OF ALL RESPONSES TO PHQ QUESTIONS 1-9: 3
9. THOUGHTS THAT YOU WOULD BE BETTER OFF DEAD, OR OF HURTING YOURSELF: 0
7. TROUBLE CONCENTRATING ON THINGS, SUCH AS READING THE NEWSPAPER OR WATCHING TELEVISION: 0
1. LITTLE INTEREST OR PLEASURE IN DOING THINGS: 1
5. POOR APPETITE OR OVEREATING: 0
SUM OF ALL RESPONSES TO PHQ QUESTIONS 1-9: 3
8. MOVING OR SPEAKING SO SLOWLY THAT OTHER PEOPLE COULD HAVE NOTICED. OR THE OPPOSITE, BEING SO FIGETY OR RESTLESS THAT YOU HAVE BEEN MOVING AROUND A LOT MORE THAN USUAL: 0
SUM OF ALL RESPONSES TO PHQ QUESTIONS 1-9: 3
3. TROUBLE FALLING OR STAYING ASLEEP: 1
2. FEELING DOWN, DEPRESSED OR HOPELESS: 1
6. FEELING BAD ABOUT YOURSELF - OR THAT YOU ARE A FAILURE OR HAVE LET YOURSELF OR YOUR FAMILY DOWN: 0
10. IF YOU CHECKED OFF ANY PROBLEMS, HOW DIFFICULT HAVE THESE PROBLEMS MADE IT FOR YOU TO DO YOUR WORK, TAKE CARE OF THINGS AT HOME, OR GET ALONG WITH OTHER PEOPLE: 0
SUM OF ALL RESPONSES TO PHQ QUESTIONS 1-9: 3

## 2022-09-27 ASSESSMENT — SOCIAL DETERMINANTS OF HEALTH (SDOH): HOW HARD IS IT FOR YOU TO PAY FOR THE VERY BASICS LIKE FOOD, HOUSING, MEDICAL CARE, AND HEATING?: NOT HARD AT ALL

## 2022-09-27 NOTE — PROGRESS NOTES
Subjective:      Patient ID: Umair Pineda is a 81 y.o. y.o. female. Here for Medicare wellness. Gabapentin was effective and she felt less sedation / drowsiness than Lyrica. As the weather cools feeling like more Fibromyalgia breakthrough. Taking gabapentin 300mg bid,  questions upping the dose a bit. 300 TID trial.    HPI      Chief Complaint   Patient presents with    Medicare AWV       Allergies   Allergen Reactions    Cortisone Acetate Other (See Comments)     agitation       Past Medical History:   Diagnosis Date    Anxiety     Arthritis     knee    Back pain     Depression     Fibromyalgia     Hemorrhoids     Obesity     PONV (postoperative nausea and vomiting)     Reflux     Sleep apnea     uses CPAP    TMJ syndrome     Vision impairment     glasses       Past Surgical History:   Procedure Laterality Date    BREAST SURGERY      reduction    BROW LIFT Bilateral 11/12/13    FINGER TRIGGER RELEASE  12/9/11    left thumb and cyst    FINGER TRIGGER RELEASE Bilateral     4th finger    FINGER TRIGGER RELEASE Right 7/6/2015    index and long    FINGER TRIGGER RELEASE Left 2/22/16    FOOT SURGERY  1980's    Bilat.  feet /toes shaved\"    JOINT REPLACEMENT Right 11/09/2016    knee    JOINT REPLACEMENT Left 10/09/2017    knee    KNEE ARTHROSCOPY  11-    Left knee video arthroscopy, partial medial and lateral meniscectomy    KNEE SURGERY  3/17/2011    Video arthroscopy withpartial medial menisectomy rt. knee    MOUTH SURGERY      OTHER SURGICAL HISTORY Left     dequervains    OTHER SURGICAL HISTORY  10/09/2017    LEFT TOTAL KNEE ARTHROPLASTY              TOTAL KNEE ARTHROPLASTY Right 11/09/2016    TUBAL LIGATION         Social History     Socioeconomic History    Marital status:      Spouse name: Not on file    Number of children: Not on file    Years of education: Not on file    Highest education level: Not on file   Occupational History    Not on file   Tobacco Use    Smoking status: Never Smokeless tobacco: Never   Vaping Use    Vaping Use: Never used   Substance and Sexual Activity    Alcohol use: Yes     Alcohol/week: 6.0 standard drinks     Types: 6 Cans of beer per week    Drug use: No    Sexual activity: Yes     Partners: Male   Other Topics Concern    Not on file   Social History Narrative    Not on file     Social Determinants of Health     Financial Resource Strain: Low Risk     Difficulty of Paying Living Expenses: Not hard at all   Food Insecurity: No Food Insecurity    Worried About Running Out of Food in the Last Year: Never true    Ran Out of Food in the Last Year: Never true   Transportation Needs: Not on file   Physical Activity: Not on file   Stress: Not on file   Social Connections: Not on file   Intimate Partner Violence: Not on file   Housing Stability: Unknown    Unable to Pay for Housing in the Last Year: No    Number of Places Lived in the Last Year: Not on file    Unstable Housing in the Last Year: No       Family History   Problem Relation Age of Onset    Cancer Father     Diabetes Sister     Other Sister         CHELSI    Stroke Mother     Cancer Brother         lung    Other Brother         CHELSI    Heart Disease Brother         angina       Vitals:    09/27/22 0935   BP: 138/82   Pulse: 75   Resp: 16   Temp: 97.1 °F (36.2 °C)   SpO2: 98%       Wt Readings from Last 3 Encounters:   09/27/22 219 lb (99.3 kg)   04/06/22 225 lb (102.1 kg)   11/12/21 200 lb (90.7 kg)       Review of Systems   Constitutional:         Overall has issues with fibro and muscular issues,    Stable generally   Musculoskeletal:         Pain deep to the right thigh-  more posterior. Thinking fibro trigger point. Right foot / ankle pain,  crackles . No lax feeling. Objective:   Physical Exam  Musculoskeletal:      Comments: Hip rotation decent  Inner thigh tenderness? Ankle a bit touchy. No acute swelling / laxiety   Neurological:      Mental Status: She is oriented to person, place, and time. Psychiatric:         Mood and Affect: Mood normal.         Behavior: Behavior normal.         Thought Content: Thought content normal.       Assessment:      Medicare Wellness  Fibromyalgia  Depressive disorder-          Plan:     Can up the gabapentin - 300 tid and see if better response to fibro  General inst and guidance. Current Outpatient Medications   Medication Sig Dispense Refill    Multiple Vitamins-Minerals (THERAPEUTIC MULTIVITAMIN-MINERALS) tablet Take 1 tablet by mouth daily      gabapentin (NEURONTIN) 300 MG capsule Take 1 capsule by mouth 2 times daily for 210 days. Intended supply: 90 days 60 capsule 6    omeprazole (PRILOSEC) 40 MG delayed release capsule TAKE 1 CAPSULE EVERY DAY 90 capsule 3    fluticasone (FLONASE) 50 MCG/ACT nasal spray USE 2 SPRAYS IN EACH NOSTRIL EVERY DAY 3 each 3    DULoxetine (CYMBALTA) 20 MG extended release capsule TAKE 1 CAPSULE TWICE DAILY 180 capsule 3    Loratadine (CLARITIN PO) Take 10 mg by mouth daily as needed        No current facility-administered medications for this visit. Medicare Annual Wellness Visit    Gregory Smith is here for Medicare AWV    Assessment & Plan   Fibromyalgia  -     gabapentin (NEURONTIN) 300 MG capsule; Take 1 capsule by mouth 3 times daily for 180 days. , Disp-270 capsule, R-1Normal    Recommendations for Preventive Services Due: see orders and patient instructions/AVS.  Recommended screening schedule for the next 5-10 years is provided to the patient in written form: see Patient Instructions/AVS.     No follow-ups on file. Subjective       Patient's complete Health Risk Assessment and screening values have been reviewed and are found in Flowsheets. The following problems were reviewed today and where indicated follow up appointments were made and/or referrals ordered.     Positive Risk Factor Screenings with Interventions:    Fall Risk:  Do you feel unsteady or are you worried about falling? : (!) yes (has 2 new knees)  2 or more falls in past year?: no  Fall with injury in past year?: no   Fall Risk Interventions:    Home safety tips provided            General Health and ACP:       Advance Directives       Power of [address] Will ACP-Advance Directive ACP-Power of     Not on File Not on File Not on File Not on File        General Health Risk Interventions:  Discussed living will, and health maintenance & surveillance     Health Habits/Nutrition:  Physical Activity: Not on file              Health Habits/Nutrition Interventions:  General  issues discussed. Eye exam discussed             Objective   Vitals:    09/27/22 0935   BP: 138/82   Site: Left Upper Arm   Position: Sitting   Pulse: 75   Resp: 16   Temp: 97.1 °F (36.2 °C)   TempSrc: Temporal   SpO2: 98%   Weight: 219 lb (99.3 kg)      Body mass index is 38.79 kg/m². Allergies   Allergen Reactions    Cortisone Acetate Other (See Comments)     agitation     Prior to Visit Medications    Medication Sig Taking? Authorizing Provider   Multiple Vitamins-Minerals (THERAPEUTIC MULTIVITAMIN-MINERALS) tablet Take 1 tablet by mouth daily Yes Historical Provider, MD   gabapentin (NEURONTIN) 300 MG capsule Take 1 capsule by mouth 3 times daily for 180 days.  Yes Lio Austin DO   omeprazole (PRILOSEC) 40 MG delayed release capsule TAKE 1 CAPSULE EVERY DAY Yes Lio Austin DO   fluticasone (FLONASE) 50 MCG/ACT nasal spray USE 2 SPRAYS IN EACH NOSTRIL EVERY DAY Yes Lio Austin DO   DULoxetine (CYMBALTA) 20 MG extended release capsule TAKE 1 CAPSULE TWICE DAILY Yes Lio Austin DO   Loratadine (CLARITIN PO) Take 10 mg by mouth daily as needed  Yes Historical Provider, MD Field (Including outside providers/suppliers regularly involved in providing care):   Patient Care Team:  Lio Austin DO as PCP - Yasmani Jacobs DO as PCP - [facility] Empaneled Provider  Kalani Cline MD as Surgeon (Orthopedic Surgery)  Venkat Diana as Physician Assistant (Orthopedic Surgery)  Bandar Damon MD as Consulting Physician ([address])  TOM Lopez CNP as Nurse Practitioner (Nurse Practitioner)     Reviewed and updated this visit:  Tobacco  Allergies  Meds  Med Hx  Surg Hx  Soc Hx  Fam Hx        Well visit discussed. .    Fibromyalgia and med dose adjustment discussed,    Follow 4-6 months

## 2022-12-20 RX ORDER — FLUTICASONE PROPIONATE 50 MCG
SPRAY, SUSPENSION (ML) NASAL
Qty: 48 G | Refills: 1 | Status: SHIPPED | OUTPATIENT
Start: 2022-12-20

## 2022-12-20 RX ORDER — DULOXETIN HYDROCHLORIDE 20 MG/1
CAPSULE, DELAYED RELEASE ORAL
Qty: 180 CAPSULE | Refills: 1 | Status: SHIPPED | OUTPATIENT
Start: 2022-12-20

## 2022-12-20 RX ORDER — OMEPRAZOLE 40 MG/1
CAPSULE, DELAYED RELEASE ORAL
Qty: 90 CAPSULE | Refills: 1 | Status: SHIPPED | OUTPATIENT
Start: 2022-12-20

## 2022-12-20 NOTE — TELEPHONE ENCOUNTER
9/27/2022          Future Appointments   Date Time Provider Vickey Rea   7/25/2023 10:00 AM TOM Agustin [facility]

## 2023-01-04 ENCOUNTER — HOSPITAL ENCOUNTER (OUTPATIENT)
Dept: WOMENS IMAGING | Age: 73
Discharge: HOME | End: 2023-01-04
Payer: MEDICARE

## 2023-01-04 DIAGNOSIS — Z12.31 VISIT FOR SCREENING MAMMOGRAM: ICD-10-CM

## 2023-01-04 PROCEDURE — 77067 SCR MAMMO BI INCL CAD: CPT

## 2023-01-18 ENCOUNTER — OFFICE VISIT (OUTPATIENT)
Dept: FAMILY MEDICINE CLINIC | Age: 73
End: 2023-01-18

## 2023-01-18 VITALS
SYSTOLIC BLOOD PRESSURE: 136 MMHG | DIASTOLIC BLOOD PRESSURE: 78 MMHG | BODY MASS INDEX: 38.44 KG/M2 | WEIGHT: 217 LBS | TEMPERATURE: 97.3 F | HEART RATE: 77 BPM | OXYGEN SATURATION: 98 % | RESPIRATION RATE: 16 BRPM

## 2023-01-18 DIAGNOSIS — E78.2 MIXED HYPERLIPIDEMIA: ICD-10-CM

## 2023-01-18 DIAGNOSIS — R73.01 ELEVATED FASTING GLUCOSE: ICD-10-CM

## 2023-01-18 DIAGNOSIS — F33.42 RECURRENT MAJOR DEPRESSIVE DISORDER, IN FULL REMISSION (HCC): ICD-10-CM

## 2023-01-18 DIAGNOSIS — M76.60 ACHILLES TENDON PAIN: Primary | ICD-10-CM

## 2023-01-18 DIAGNOSIS — M79.7 FIBROMYALGIA: ICD-10-CM

## 2023-01-18 DIAGNOSIS — E55.9 VITAMIN D DEFICIENCY: ICD-10-CM

## 2023-01-18 RX ORDER — CETIRIZINE HYDROCHLORIDE 10 MG/1
10 TABLET ORAL DAILY
Qty: 30 TABLET | Refills: 1 | Status: SHIPPED | OUTPATIENT
Start: 2023-01-18

## 2023-01-18 ASSESSMENT — PATIENT HEALTH QUESTIONNAIRE - PHQ9
5. POOR APPETITE OR OVEREATING: 0
8. MOVING OR SPEAKING SO SLOWLY THAT OTHER PEOPLE COULD HAVE NOTICED. OR THE OPPOSITE, BEING SO FIGETY OR RESTLESS THAT YOU HAVE BEEN MOVING AROUND A LOT MORE THAN USUAL: 0
1. LITTLE INTEREST OR PLEASURE IN DOING THINGS: 0
7. TROUBLE CONCENTRATING ON THINGS, SUCH AS READING THE NEWSPAPER OR WATCHING TELEVISION: 0
6. FEELING BAD ABOUT YOURSELF - OR THAT YOU ARE A FAILURE OR HAVE LET YOURSELF OR YOUR FAMILY DOWN: 0
10. IF YOU CHECKED OFF ANY PROBLEMS, HOW DIFFICULT HAVE THESE PROBLEMS MADE IT FOR YOU TO DO YOUR WORK, TAKE CARE OF THINGS AT HOME, OR GET ALONG WITH OTHER PEOPLE: 0
4. FEELING TIRED OR HAVING LITTLE ENERGY: 0
SUM OF ALL RESPONSES TO PHQ QUESTIONS 1-9: 0
SUM OF ALL RESPONSES TO PHQ9 QUESTIONS 1 & 2: 0
9. THOUGHTS THAT YOU WOULD BE BETTER OFF DEAD, OR OF HURTING YOURSELF: 0
2. FEELING DOWN, DEPRESSED OR HOPELESS: 0
3. TROUBLE FALLING OR STAYING ASLEEP: 0
SUM OF ALL RESPONSES TO PHQ QUESTIONS 1-9: 0

## 2023-01-18 NOTE — PROGRESS NOTES
Subjective:      Patient ID: Gaston Zhang is a 81 y.o. y.o. female. Left heel pain. A few weeks. No sx walking,  sore to touch. Hx fibromyalgia. Switched to gabapentin from lyrical.Not as effective. Taking 3 capsules day that she believes are 100 mg each. Script is for 300 mg caps. Going to check bottle      Foot Pain     Other  Pertinent negatives include no arthralgias. Chief Complaint   Patient presents with    Foot Pain     L heel - very painful x 2-3 wks ago - worse with a new pair of boots - poss with fibromyalgia    Other     Claritin not helping unless she takes 3       Allergies   Allergen Reactions    Cortisone Acetate Other (See Comments)     agitation       Past Medical History:   Diagnosis Date    Anxiety     Arthritis     knee    Back pain     Depression     Fibromyalgia     Hemorrhoids     Obesity     PONV (postoperative nausea and vomiting)     Reflux     Sleep apnea     uses CPAP    TMJ syndrome     Vision impairment     glasses       Past Surgical History:   Procedure Laterality Date    BREAST REDUCTION SURGERY Bilateral 2000    BREAST SURGERY      reduction    BROW LIFT Bilateral 11/12/2013    FINGER TRIGGER RELEASE  12/09/2011    left thumb and cyst    FINGER TRIGGER RELEASE Bilateral     4th finger    FINGER TRIGGER RELEASE Right 07/06/2015    index and long    FINGER TRIGGER RELEASE Left 02/22/2016    FOOT SURGERY  1980's    Bilat.  feet /toes shaved\"    JOINT REPLACEMENT Right 11/09/2016    knee    JOINT REPLACEMENT Left 10/09/2017    knee    KNEE ARTHROSCOPY  11/23/2010    Left knee video arthroscopy, partial medial and lateral meniscectomy    KNEE SURGERY  03/17/2011    Video arthroscopy withpartial medial menisectomy rt. knee    MOUTH SURGERY      OTHER SURGICAL HISTORY Left     dequervains    OTHER SURGICAL HISTORY  10/09/2017    LEFT TOTAL KNEE ARTHROPLASTY              TOTAL KNEE ARTHROPLASTY Right 11/09/2016    TUBAL LIGATION         Social History     Socioeconomic History    Marital status:      Spouse name: Not on file    Number of children: Not on file    Years of education: Not on file    Highest education level: Not on file   Occupational History    Not on file   Tobacco Use    Smoking status: Never    Smokeless tobacco: Never   Vaping Use    Vaping Use: Never used   Substance and Sexual Activity    Alcohol use: Yes     Alcohol/week: 6.0 standard drinks     Types: 6 Cans of beer per week    Drug use: No    Sexual activity: Yes     Partners: Male   Other Topics Concern    Not on file   Social History Narrative    Not on file     Social Determinants of Health     Financial Resource Strain: Low Risk     Difficulty of Paying Living Expenses: Not hard at all   Food Insecurity: No Food Insecurity    Worried About Running Out of Food in the Last Year: Never true    Ran Out of Food in the Last Year: Never true   Transportation Needs: Not on file   Physical Activity: Not on file   Stress: Not on file   Social Connections: Not on file   Intimate Partner Violence: Not on file   Housing Stability: Unknown    Unable to Pay for Housing in the Last Year: No    Number of [place] in the Last Year: Not on file    Unstable Housing in the Last Year: No       Family History   Problem Relation Age of Onset    Cancer Father     Diabetes Sister     Other Sister         CHELSI    Stroke Mother     Cancer Brother         lung    Other Brother         CHELSI    Heart Disease Brother         angina       Vitals:    01/18/23 1042   BP: 136/78   Pulse: 77   Resp: 16   Temp: 97.3 °F (36.3 °C)   SpO2: 98%       Wt Readings from Last 3 Encounters:   01/18/23 217 lb (98.4 kg)   09/27/22 219 lb (99.3 kg)   04/06/22 225 lb (102.1 kg)       Review of Systems   HENT:          Claritin is not effective currently     Will change   Musculoskeletal:  Negative for arthralgias. Left achilles area has localized sore spot ,  No planter soreness    Ankle flex / ext OK and not tender. Skeet Regulus tendon not swollen or erytheam,   full motion    Hx fibromyalgia       Objective:   Physical Exam  Constitutional:       Appearance: She is not ill-appearing. Musculoskeletal:      Right lower leg: No edema. Left lower leg: No edema. Comments: Foot motion good    No acute swelling  Plantar area neg    Locally tender achilles    Skin:     General: Skin is warm and dry. Neurological:      Mental Status: She is alert and oriented to person, place, and time. Assessment:      Heel pain  Fibromyalgia  MDD          Plan:     Is due for some fasting labs    Change claritin,  try zyrtec  Try topical voltaren on the heel. Follow-up in about 4 weeks. If not improved we will get Ortho referral and evaluation. Current Outpatient Medications   Medication Sig Dispense Refill    DULoxetine (CYMBALTA) 20 MG extended release capsule TAKE 1 CAPSULE TWICE DAILY 180 capsule 1    fluticasone (FLONASE) 50 MCG/ACT nasal spray USE 2 SPRAYS IN EACH NOSTRIL EVERY DAY 48 g 1    omeprazole (PRILOSEC) 40 MG delayed release capsule TAKE 1 CAPSULE EVERY DAY 90 capsule 1    Multiple Vitamins-Minerals (THERAPEUTIC MULTIVITAMIN-MINERALS) tablet Take 1 tablet by mouth daily      gabapentin (NEURONTIN) 300 MG capsule Take 1 capsule by mouth 3 times daily for 180 days. 270 capsule 1    Loratadine (CLARITIN PO) Take 10 mg by mouth daily as needed        No current facility-administered medications for this visit.

## 2023-01-20 ENCOUNTER — TELEPHONE (OUTPATIENT)
Dept: FAMILY MEDICINE CLINIC | Age: 73
End: 2023-01-20

## 2023-01-20 NOTE — TELEPHONE ENCOUNTER
Patient calling in with the dose of her gabapentin 300 mg.   Patient said Dr. Juana Barlow wanted to know this and if he needs to change the dose please send to     [address] [place] [address], [address] [place] [address] [phone] Amador Torres [phone]    1/18/2023 last appt

## 2023-01-24 DIAGNOSIS — R73.01 ELEVATED FASTING GLUCOSE: ICD-10-CM

## 2023-01-24 DIAGNOSIS — E55.9 VITAMIN D DEFICIENCY: ICD-10-CM

## 2023-01-24 DIAGNOSIS — M79.7 FIBROMYALGIA: ICD-10-CM

## 2023-01-24 DIAGNOSIS — E78.2 MIXED HYPERLIPIDEMIA: ICD-10-CM

## 2023-01-24 LAB
A/G RATIO: 1.7 (ref 1.1–2.2)
ALBUMIN SERPL-MCNC: 4.5 G/DL (ref 3.4–5)
ALP BLD-CCNC: 91 U/L (ref 40–129)
ALT SERPL-CCNC: 25 U/L (ref 10–40)
ANION GAP SERPL CALCULATED.3IONS-SCNC: 12 MMOL/L (ref 3–16)
AST SERPL-CCNC: 22 U/L (ref 15–37)
BASOPHILS ABSOLUTE: 0 K/UL (ref 0–0.2)
BASOPHILS RELATIVE PERCENT: 1.2 %
BILIRUB SERPL-MCNC: 0.5 MG/DL (ref 0–1)
BUN BLDV-MCNC: 15 MG/DL (ref 7–20)
CALCIUM SERPL-MCNC: 10 MG/DL (ref 8.3–10.6)
CHLORIDE BLD-SCNC: 105 MMOL/L (ref 99–110)
CHOLESTEROL, TOTAL: 256 MG/DL (ref 0–199)
CO2: 25 MMOL/L (ref 21–32)
CREAT SERPL-MCNC: 0.7 MG/DL (ref 0.6–1.2)
EOSINOPHILS ABSOLUTE: 0.1 K/UL (ref 0–0.6)
EOSINOPHILS RELATIVE PERCENT: 3.7 %
GFR SERPL CREATININE-BSD FRML MDRD: >60 ML/MIN/{1.73_M2}
GLUCOSE BLD-MCNC: 118 MG/DL (ref 70–99)
HCT VFR BLD CALC: 42.7 % (ref 36–48)
HDLC SERPL-MCNC: 74 MG/DL (ref 40–60)
HEMOGLOBIN: 13.6 G/DL (ref 12–16)
LDL CHOLESTEROL CALCULATED: 162 MG/DL
LYMPHOCYTES ABSOLUTE: 1.2 K/UL (ref 1–5.1)
LYMPHOCYTES RELATIVE PERCENT: 29.8 %
MCH RBC QN AUTO: 28.1 PG (ref 26–34)
MCHC RBC AUTO-ENTMCNC: 31.8 G/DL (ref 31–36)
MCV RBC AUTO: 88.5 FL (ref 80–100)
MONOCYTES ABSOLUTE: 0.3 K/UL (ref 0–1.3)
MONOCYTES RELATIVE PERCENT: 7.4 %
NEUTROPHILS ABSOLUTE: 2.3 K/UL (ref 1.7–7.7)
NEUTROPHILS RELATIVE PERCENT: 57.9 %
PDW BLD-RTO: 13.8 % (ref 12.4–15.4)
PLATELET # BLD: 305 K/UL (ref 135–450)
PMV BLD AUTO: 8.5 FL (ref 5–10.5)
POTASSIUM SERPL-SCNC: 4.2 MMOL/L (ref 3.5–5.1)
RBC # BLD: 4.82 M/UL (ref 4–5.2)
SODIUM BLD-SCNC: 142 MMOL/L (ref 136–145)
TOTAL PROTEIN: 7.1 G/DL (ref 6.4–8.2)
TRIGL SERPL-MCNC: 101 MG/DL (ref 0–150)
TSH REFLEX: 1.13 UIU/ML (ref 0.27–4.2)
VITAMIN D 25-HYDROXY: 36.3 NG/ML
VLDLC SERPL CALC-MCNC: 20 MG/DL
WBC # BLD: 3.9 K/UL (ref 4–11)

## 2023-01-24 RX ORDER — GABAPENTIN 300 MG/1
300 CAPSULE ORAL 3 TIMES DAILY
Qty: 270 CAPSULE | Refills: 1 | Status: SHIPPED | OUTPATIENT
Start: 2023-01-24 | End: 2023-02-08 | Stop reason: SDUPTHER

## 2023-01-31 ENCOUNTER — TELEPHONE (OUTPATIENT)
Dept: FAMILY MEDICINE CLINIC | Age: 73
End: 2023-01-31

## 2023-01-31 NOTE — TELEPHONE ENCOUNTER
Patient was in on 1-18-23 for ankle pain, patient still having pain and is asking that Nancy All refer her to a foot doctor.     1/18/2023  last appt    Future Appointments   Date Time Provider Vickey Rea   7/25/2023 10:00 AM TOM Guzmán [facility]

## 2023-02-01 NOTE — TELEPHONE ENCOUNTER
Called -  Wednesday      MA  please call her. There is a Dr Yen Jara in the [place] area. I can refer to him if she wants. Let me know if ok or if she has someone else in mind ?

## 2023-02-07 ENCOUNTER — TELEPHONE (OUTPATIENT)
Dept: FAMILY MEDICINE CLINIC | Age: 73
End: 2023-02-07

## 2023-02-07 NOTE — TELEPHONE ENCOUNTER
Spoke to pt. Kendrick gave her name and she called Dr Rodrigo Christensen. Saw her and having MRI and treating her.

## 2023-02-07 NOTE — TELEPHONE ENCOUNTER
Xiomy with [facility] called to report the RX for Gabapentin has the incorrect QTY. She states because patient takes 300 mg 4x per day (increased from 3x per day), the QTY should be 360 rather 270.

## 2023-02-17 ENCOUNTER — TELEPHONE (OUTPATIENT)
Dept: FAMILY MEDICINE CLINIC | Age: 73
End: 2023-02-17

## 2023-02-17 NOTE — TELEPHONE ENCOUNTER
----- Message from [facility] Task Messenger [employer] sent at 2/17/2023 11:22 AM EST -----  Subject: Referral Request    Reason for referral request? Patient would like to have a referral sent to   the foot  you have for a second opinion. Please call patient when   referral is in to let her know the name. Provider patient wants to be referred to(if known):     Provider Phone Number(if known):     Additional Information for Provider?   ---------------------------------------------------------------------------  --------------  [employer] [employer]    [phone]; OK to leave message on voicemail  ---------------------------------------------------------------------------  --------------

## 2023-05-11 ENCOUNTER — TELEPHONE (OUTPATIENT)
Dept: PULMONOLOGY | Age: 73
End: 2023-05-11

## 2023-05-15 NOTE — TELEPHONE ENCOUNTER
Pt LM stating she wants to know the status of her order. I called patient back and informed her of what was advised per Taylor Saleem and Dean Vargas. I tried to make an appt but patient states she can't do that at the moment. Patient wants to speak to clinical team. Please advise.

## 2023-05-15 NOTE — TELEPHONE ENCOUNTER
Spoke with patient and explained that insurance requires she be seen within 6 months. Patient did not want to schedule at this time and will call back to schedule.

## 2023-05-15 NOTE — TELEPHONE ENCOUNTER
Pt needs to be scheduled for a follow up visit, we will need to document that machine is no longer working so [employer] will cover.  Please call pt

## 2023-05-18 NOTE — PROGRESS NOTES
Diagnosis: [x] CHELSI (G47.33) [] CSA (G47.31) [] Apnea (G47.30)   Length of Need: [x] 15 Months [] 99 Months [] Other:   Machine (EMORY!): [] Respironics Dream Station      Auto [x] ResMed AirSense 11 Auto with modem for remote monitoring [] Other:     [x]  CPAP () [] Bilevel ()   Mode: [x] Auto [] Spontaneous    Mode: [] Auto [] Spontaneous          APAP - Settings  Pressure Min: 10 cmH2O  Pressure Max: 16 cmH2O               Comfort Settings: Ramp Pressure: 5 cmH2O  Ramp time: 15 min  Flex/EPR - 3 full time                                  For ResMed Bileve (TiMax-4 sec   TiMin- 0.2 sec)     Humidifier: [x] Heated ()        [x] Water chamber replacement ()/ 1 per 6 months        Mask:   [x] Nasal () /1 per 3 months [] Full Face () /1 per 3 months   [x] Patient choice -Size and fit mask [] Patient Choice - Size and fit mask   [] Dispense: [] Dispense:   [x] Headgear () / 1 per 3 months [] Headgear () / 1 per 3 months   [] Replacement Nasal Cushion ()/2 per month [] Interface Replacement ()/1 per month   [x] Replacement Nasal Pillows ()/2 per month         Tubing: [x] Heated ()/1 per 3 months    [] Standard ()/1 per 3 months [] Other:           Filters: [x] Non-disposable ()/1 per 6 months     [x] Ultra-Fine, Disposable ()/2 per month        Miscellaneous: [] Chin Strap ()/ 1 per 6 months [] O2 bleed-in:        LPM   [] Oxymetry on CPAP/Bilevel []  Other:         Start Order Date: 05/18/23    MEDICAL JUSTIFICATION:  I, the undersigned, certify that the above prescribed supplies are medically necessary for this patients wellbeing. In my opinion, the supplies are both reasonable and necessary in reference to accepted standards of medicalpractice in treatment of this patients condition.     Veena Mendenhall NP    NPI: [id number]       Order Signed Date: 05/18/23  [facility]  Pulmonary,

## 2023-05-18 NOTE — TELEPHONE ENCOUNTER
Pt called in stating that her insurance stated that she needed a prior auth from our office. She also stated that she doesn't want [employer] she wants [employer].      [employer]- [phone]  Pt - [phone]

## 2023-05-31 ENCOUNTER — TELEPHONE (OUTPATIENT)
Dept: FAMILY MEDICINE CLINIC | Age: 73
End: 2023-05-31

## 2023-06-08 ASSESSMENT — SLEEP AND FATIGUE QUESTIONNAIRES
HOW LIKELY ARE YOU TO NOD OFF OR FALL ASLEEP WHILE SITTING AND TALKING TO SOMEONE: WOULD NEVER DOZE
HOW LIKELY ARE YOU TO NOD OFF OR FALL ASLEEP WHILE LYING DOWN TO REST IN THE AFTERNOON WHEN CIRCUMSTANCES PERMIT: WOULD NEVER DOZE
HOW LIKELY ARE YOU TO NOD OFF OR FALL ASLEEP WHILE LYING DOWN TO REST IN THE AFTERNOON WHEN CIRCUMSTANCES PERMIT: 0
HOW LIKELY ARE YOU TO NOD OFF OR FALL ASLEEP WHILE SITTING INACTIVE IN A PUBLIC PLACE: 0
HOW LIKELY ARE YOU TO NOD OFF OR FALL ASLEEP WHEN YOU ARE A PASSENGER IN A CAR FOR AN HOUR WITHOUT A BREAK: WOULD NEVER DOZE
HOW LIKELY ARE YOU TO NOD OFF OR FALL ASLEEP WHILE SITTING INACTIVE IN A PUBLIC PLACE: WOULD NEVER DOZE
HOW LIKELY ARE YOU TO NOD OFF OR FALL ASLEEP IN A CAR, WHILE STOPPED FOR A FEW MINUTES IN TRAFFIC: WOULD NEVER DOZE
ESS TOTAL SCORE: 0
HOW LIKELY ARE YOU TO NOD OFF OR FALL ASLEEP WHILE WATCHING TV: 0
HOW LIKELY ARE YOU TO NOD OFF OR FALL ASLEEP WHEN YOU ARE A PASSENGER IN A CAR FOR AN HOUR WITHOUT A BREAK: 0
HOW LIKELY ARE YOU TO NOD OFF OR FALL ASLEEP WHILE SITTING AND READING: 0
HOW LIKELY ARE YOU TO NOD OFF OR FALL ASLEEP WHILE SITTING QUIETLY AFTER LUNCH WITHOUT ALCOHOL: 0
HOW LIKELY ARE YOU TO NOD OFF OR FALL ASLEEP WHILE SITTING AND TALKING TO SOMEONE: 0
HOW LIKELY ARE YOU TO NOD OFF OR FALL ASLEEP IN A CAR, WHILE STOPPED FOR A FEW MINUTES IN TRAFFIC: 0
HOW LIKELY ARE YOU TO NOD OFF OR FALL ASLEEP WHILE WATCHING TV: WOULD NEVER DOZE
HOW LIKELY ARE YOU TO NOD OFF OR FALL ASLEEP WHILE SITTING AND READING: WOULD NEVER DOZE
HOW LIKELY ARE YOU TO NOD OFF OR FALL ASLEEP WHILE SITTING QUIETLY AFTER LUNCH WITHOUT ALCOHOL: WOULD NEVER DOZE

## 2023-06-09 ENCOUNTER — TELEMEDICINE (OUTPATIENT)
Dept: PULMONOLOGY | Age: 73
End: 2023-06-09
Payer: MEDICARE

## 2023-06-09 DIAGNOSIS — G47.33 OBSTRUCTIVE SLEEP APNEA: Primary | ICD-10-CM

## 2023-06-09 DIAGNOSIS — E66.01 CLASS 3 SEVERE OBESITY DUE TO EXCESS CALORIES WITHOUT SERIOUS COMORBIDITY WITH BODY MASS INDEX (BMI) OF 40.0 TO 44.9 IN ADULT (HCC): ICD-10-CM

## 2023-06-09 PROCEDURE — 99442 PR PHYS/QHP TELEPHONE EVALUATION 11-20 MIN: CPT | Performed by: NURSE PRACTITIONER

## 2023-06-09 NOTE — ASSESSMENT & PLAN NOTE
Chronic-Stable: Reviewed and analyzed results of physiologic download from patient's machine and reviewed with patient. Supplies and parts as needed for her machine. These are medically necessary. Limit caffeine use after 3pm. Based on the analyzed data will continue with current settings. Stable on her machine at current settings, getting benefit from the use, and having minimal side effects. Will place order for a new machine. Machine is over 11years old and broken beyond repair. She is gaining benefit from  Machine use and machine is medically necessary. Will see her back between 31 and 90 days for new machine compliance. Encouraged her to contact the office with any questions or concerns.

## 2023-06-09 NOTE — PROGRESS NOTES
Diagnosis: [x] CHELSI (G47.33) [] CSA (G47.31) [] Apnea (G47.30)   Length of Need: [x] 15 Months [] 99 Months [] Other:   Machine (EMORY!): [] Respironics Dream Station      Auto [x] ResMed AirSense 11 Auto [] Other:     [x]  CPAP () [] Bilevel ()   Mode: [x] Auto [] Spontaneous    Mode: [] Auto [] Spontaneous          APAP - Settings  Pressure Min: 10 cmH2O  Pressure Max: 18 cmH2O               Comfort Settings: Ramp Pressure: 5 cmH2O  Ramp time: 15 min  Flex/EPR - 3 full time                                  For ResMed Bileve (TiMax-4 sec   TiMin- 0.2 sec)     Humidifier: [x] Heated ()        [x] Water chamber replacement ()/ 1 per 6 months        Mask:   [x] Nasal () /1 per 3 months [] Full Face () /1 per 3 months   [x] Patient choice -Size and fit mask [] Patient Choice - Size and fit mask   [] Dispense: [] Dispense:   [x] Headgear () / 1 per 3 months [] Headgear () / 1 per 3 months   [] Replacement Nasal Cushion ()/2 per month [] Interface Replacement ()/1 per month   [x] Replacement Nasal Pillows ()/2 per month         Tubing: [x] Heated ()/1 per 3 months    [] Standard ()/1 per 3 months [] Other:           Filters: [x] Non-disposable ()/1 per 6 months     [x] Ultra-Fine, Disposable ()/2 per month        Miscellaneous: [] Chin Strap ()/ 1 per 6 months [] O2 bleed-in:        LPM   [] Oxymetry on CPAP/Bilevel []  Other:         Start Order Date: 06/09/23    MEDICAL JUSTIFICATION:  I, the undersigned, certify that the above prescribed supplies are medically necessary for this patients wellbeing. In my opinion, the supplies are both reasonable and necessary in reference to accepted standards of medicalpractice in treatment of this patients condition.     Liliane Grade NP    NPI: [id number]       Order Signed Date: 06/09/23  [facility]  Pulmonary, Sleep, and Critical Care

## 2023-06-09 NOTE — PROGRESS NOTES
DASH or Mediterranean diets. Start time: 0945 a.m. Stop time: 0956 a.m. Visit duration:  [] 5-10 Min ([id number])      [] 11-20 min (78833)   [] 21-30 Min (92400)    Subjective:     Patient ID: Jhoana Girard is a 81 y.o. female. Subjective   HPI:    Machine Modem/Download Info:  Compliance (hours/night): 10.83 hrs/night  Download AHI (/hour): 3.6 /HR  Average CPAP Pressure : 13.5 cmH2O       APAP - Settings  Pressure Min: 10 cmH2O  Pressure Max: 18 cmH2O      Comfort Settings  Humidity Level (0-8): 4  Flex/EPR (0-3): 3 PAP Mask  Mask Type: Nasal pillows     CHELSI:    Jhoana Girard presents today for follow-up for sleep apnea. she reports she was doing well with her machine until it  about 1 month ago and will no longer turn on. This was her replacement machine she received for the recall but she has had it over a year and was told it was past the warranty to be repaired. Prior, she felt the pressure on her machine was comfortable. She was sleeping well and was waking rested. Some chronic post nasal drip. Uses Flonase and Claritin with relief. she denies headaches, congestion, nosebleeds, dryness, aerophagia, or drowsiness while driving. her mask is comfortable and is fitting well. DME Company - Other [employer]    [place]: Total Score: 0    Social History     Socioeconomic History    Marital status:      Spouse name: Not on file    Number of children: Not on file    Years of education: Not on file    Highest education level: Not on file   Occupational History    Not on file   Tobacco Use    Smoking status: Never    Smokeless tobacco: Never   Vaping Use    Vaping Use: Never used   Substance and Sexual Activity    Alcohol use:  Yes     Alcohol/week: 6.0 standard drinks     Types: 6 Cans of beer per week    Drug use: No    Sexual activity: Yes     Partners: Male   Other Topics Concern    Not on file   Social History Narrative    Not on file     Social Determinants of Health     Financial

## 2023-06-21 ENCOUNTER — TELEPHONE (OUTPATIENT)
Dept: FAMILY MEDICINE CLINIC | Age: 73
End: 2023-06-21

## 2023-06-21 ENCOUNTER — TELEPHONE (OUTPATIENT)
Dept: PULMONOLOGY | Age: 73
End: 2023-06-21

## 2023-06-21 NOTE — TELEPHONE ENCOUNTER
Pt called back and lm stating that she talked to Mallory earlier and the unit she received is pre set by marissa and the modem should be active.

## 2023-06-21 NOTE — TELEPHONE ENCOUNTER
Pt called in stating she received her new CPAP and has started using it, but the pressures don't seem to be right. I explained to pt that she will need to take machine to [facility] to have them set it up, and that they have the proper pressure settings on the order from [facility]. Provided number for [facility], pt will call back if she has any issues.

## 2023-06-21 NOTE — TELEPHONE ENCOUNTER
Informed patient that we are waiting on [employer] to link [facility].

## 2023-06-22 DIAGNOSIS — R73.02 IGT (IMPAIRED GLUCOSE TOLERANCE): Primary | ICD-10-CM

## 2023-06-22 NOTE — TELEPHONE ENCOUNTER
Called An again - was informed the [place] RT is out and was transferred to the [place] [facility]. I got her voicemail and left a detailed message asking to please link the patient and to call us with any questions and to let us know it has been taken care. We need to follow up to make sure this is completed.

## 2023-06-22 NOTE — TELEPHONE ENCOUNTER
Trinity from [employer] called to inform us that she tagged pts machine so she should be all set.

## 2023-06-22 NOTE — TELEPHONE ENCOUNTER
Patient informed of pressure changes. Instructed her to unplug machine and wait 10-15 minutes and plug back in for changes to come across modem.

## 2023-07-17 ENCOUNTER — TELEPHONE (OUTPATIENT)
Dept: PULMONOLOGY | Age: 73
End: 2023-07-17

## 2023-07-17 NOTE — TELEPHONE ENCOUNTER
Patient called to say she received her machine a few weeks ago, but just got notification that Noemi Schulte is no longer in network with [employer]. Patient said [facility] is on the list from her insurance. Called [facility] to see if this was the same, they said they are not in network with [employer]. Called and left a message with [employer] [facility] to see what will happen with the patients new machine and if she will need to return it. Awaiting return call from [employer] before calling patient back. Spoke with [employer] and they said they are in network with [employer]. Patient agreeable to having order sent to [facility]. Order sent to [facility]. [employer] [facility] called back and said they were working with the insurance to move the patients to other companies. Called patient and let her know that order was sent to [facility].

## 2023-08-25 ENCOUNTER — TELEPHONE (OUTPATIENT)
Dept: PULMONOLOGY | Age: 73
End: 2023-08-25

## 2023-08-25 NOTE — TELEPHONE ENCOUNTER
Patient called stating she still has not gotten any supplies from [facility]. I spoke with [facility] and they stated that due to the machine needed to be transferred to them it was holding up her supplies. They stated they would try and get it qualified today and reach out to the patient. Spoke with patient and told her to call me next week if she doesn't hear something.

## 2023-08-28 RX ORDER — FLUTICASONE PROPIONATE 50 MCG
SPRAY, SUSPENSION (ML) NASAL
Qty: 48 G | Refills: 1 | Status: SHIPPED | OUTPATIENT
Start: 2023-08-28

## 2023-09-15 ENCOUNTER — TELEPHONE (OUTPATIENT)
Dept: PULMONOLOGY | Age: 73
End: 2023-09-15

## 2023-09-15 NOTE — TELEPHONE ENCOUNTER
Pt states that Ramirezdez Pisano increased her pressures at her most recent appointment and it is now too much, pt feels overwhelmed by pressure. Requesting that Felisa decrease pressure remotely. Please call pt back at [phone].

## 2023-09-15 NOTE — TELEPHONE ENCOUNTER
The new Airsense 11 machine that was sent to her was on factory settings. Machine settings returned to previous. Changes to her machine sent remotely.

## 2023-09-15 NOTE — TELEPHONE ENCOUNTER
Left detailed message informing patient pressure settings were remotely changed. Instructed her to unplug here machine and plug it back in then the changes will update on her machine.

## 2023-09-28 ENCOUNTER — TELEPHONE (OUTPATIENT)
Dept: PULMONOLOGY | Age: 73
End: 2023-09-28

## 2023-09-28 NOTE — TELEPHONE ENCOUNTER
Pt called back stating that she put in a request for a call from the clinical staff but she got her issues addressed so she no longer needs a call.  Closing encounter

## 2023-09-28 NOTE — TELEPHONE ENCOUNTER
Pt called in asking to speak with Girish Singer in regards to ordering supplies with [facility]. Pt spoke with Radha Michael from [address] but was asking if their was anyone else she could speak with from there. Informed pt we would call back with more info.      Ph. [phone]

## 2023-10-19 ENCOUNTER — OFFICE VISIT (OUTPATIENT)
Dept: FAMILY MEDICINE CLINIC | Age: 73
End: 2023-10-19

## 2023-10-19 ENCOUNTER — TELEPHONE (OUTPATIENT)
Dept: FAMILY MEDICINE CLINIC | Age: 73
End: 2023-10-19

## 2023-10-19 VITALS
TEMPERATURE: 97.2 F | BODY MASS INDEX: 38.59 KG/M2 | DIASTOLIC BLOOD PRESSURE: 72 MMHG | OXYGEN SATURATION: 97 % | SYSTOLIC BLOOD PRESSURE: 130 MMHG | RESPIRATION RATE: 16 BRPM | HEART RATE: 74 BPM | WEIGHT: 211 LBS

## 2023-10-19 DIAGNOSIS — R10.13 DYSPEPSIA: Primary | ICD-10-CM

## 2023-10-19 DIAGNOSIS — M79.7 FIBROMYALGIA: ICD-10-CM

## 2023-10-19 DIAGNOSIS — Z23 NEEDS FLU SHOT: ICD-10-CM

## 2023-10-19 DIAGNOSIS — F33.42 RECURRENT MAJOR DEPRESSIVE DISORDER, IN FULL REMISSION (HCC): ICD-10-CM

## 2023-10-19 RX ORDER — FAMOTIDINE 20 MG/1
20 TABLET, FILM COATED ORAL 2 TIMES DAILY
Qty: 60 TABLET | Refills: 3 | Status: SHIPPED | OUTPATIENT
Start: 2023-10-19 | End: 2023-10-19

## 2023-10-19 RX ORDER — FAMOTIDINE 20 MG/1
20 TABLET, FILM COATED ORAL 2 TIMES DAILY
Qty: 180 TABLET | Refills: 1 | Status: SHIPPED | OUTPATIENT
Start: 2023-10-19

## 2023-10-19 SDOH — ECONOMIC STABILITY: INCOME INSECURITY: HOW HARD IS IT FOR YOU TO PAY FOR THE VERY BASICS LIKE FOOD, HOUSING, MEDICAL CARE, AND HEATING?: NOT HARD AT ALL

## 2023-10-19 SDOH — ECONOMIC STABILITY: FOOD INSECURITY: WITHIN THE PAST 12 MONTHS, THE FOOD YOU BOUGHT JUST DIDN'T LAST AND YOU DIDN'T HAVE MONEY TO GET MORE.: NEVER TRUE

## 2023-10-19 SDOH — ECONOMIC STABILITY: FOOD INSECURITY: WITHIN THE PAST 12 MONTHS, YOU WORRIED THAT YOUR FOOD WOULD RUN OUT BEFORE YOU GOT MONEY TO BUY MORE.: NEVER TRUE

## 2023-10-19 ASSESSMENT — PATIENT HEALTH QUESTIONNAIRE - PHQ9
SUM OF ALL RESPONSES TO PHQ QUESTIONS 1-9: 0
10. IF YOU CHECKED OFF ANY PROBLEMS, HOW DIFFICULT HAVE THESE PROBLEMS MADE IT FOR YOU TO DO YOUR WORK, TAKE CARE OF THINGS AT HOME, OR GET ALONG WITH OTHER PEOPLE: 0
1. LITTLE INTEREST OR PLEASURE IN DOING THINGS: 0
2. FEELING DOWN, DEPRESSED OR HOPELESS: 0
5. POOR APPETITE OR OVEREATING: 0
9. THOUGHTS THAT YOU WOULD BE BETTER OFF DEAD, OR OF HURTING YOURSELF: 0
SUM OF ALL RESPONSES TO PHQ QUESTIONS 1-9: 0
7. TROUBLE CONCENTRATING ON THINGS, SUCH AS READING THE NEWSPAPER OR WATCHING TELEVISION: 0
6. FEELING BAD ABOUT YOURSELF - OR THAT YOU ARE A FAILURE OR HAVE LET YOURSELF OR YOUR FAMILY DOWN: 0
4. FEELING TIRED OR HAVING LITTLE ENERGY: 0
SUM OF ALL RESPONSES TO PHQ QUESTIONS 1-9: 0
SUM OF ALL RESPONSES TO PHQ QUESTIONS 1-9: 0
8. MOVING OR SPEAKING SO SLOWLY THAT OTHER PEOPLE COULD HAVE NOTICED. OR THE OPPOSITE, BEING SO FIGETY OR RESTLESS THAT YOU HAVE BEEN MOVING AROUND A LOT MORE THAN USUAL: 0
3. TROUBLE FALLING OR STAYING ASLEEP: 0
SUM OF ALL RESPONSES TO PHQ9 QUESTIONS 1 & 2: 0

## 2023-10-19 ASSESSMENT — ENCOUNTER SYMPTOMS
TROUBLE SWALLOWING: 0
DIARRHEA: 1
ABDOMINAL PAIN: 1
VOMITING: 1

## 2023-10-19 ASSESSMENT — COLUMBIA-SUICIDE SEVERITY RATING SCALE - C-SSRS
3. HAVE YOU BEEN THINKING ABOUT HOW YOU MIGHT KILL YOURSELF?: NO
4. HAVE YOU HAD THESE THOUGHTS AND HAD SOME INTENTION OF ACTING ON THEM?: NO
5. HAVE YOU STARTED TO WORK OUT OR WORKED OUT THE DETAILS OF HOW TO KILL YOURSELF? DO YOU INTEND TO CARRY OUT THIS PLAN?: NO

## 2023-10-19 NOTE — TELEPHONE ENCOUNTER
----- Message from Juana Zavaleta sent at 10/19/2023 12:14 PM EDT -----  Subject: Message to Provider    QUESTIONS  Information for Provider? patient had a cologard test on 8/8 and patient   now has the results. She would like him to call her if provider wants to   go over the results   ---------------------------------------------------------------------------  --------------  [address]  [phone]; OK to leave message on voicemail  ---------------------------------------------------------------------------  --------------  SCRIPT ANSWERS  Relationship to Patient?  Self

## 2023-10-19 NOTE — PATIENT INSTRUCTIONS
Add the Famotidine to the omeprazole for the acid . If the diarrhea or the acid sx continue advise me.

## 2023-10-19 NOTE — TELEPHONE ENCOUNTER
I do not see cologuard result from August as the patient reports,  can we check with cologuard and get a copy sent.    thx

## 2023-10-19 NOTE — TELEPHONE ENCOUNTER
Rx for Famotidine was sent today. 90 day requested please.     Future Appointments   Date Time Provider [address]   12/6/2023 10:20 AM TOM Tavares SLEEP MED [facility] 10/19/2023

## 2023-10-20 NOTE — TELEPHONE ENCOUNTER
Pt doesn't know if it was thru insurance or from Dr Lexus Byrne. Pt has the results. She read them to me. Negative. Pt was advised to bring a copy of the result to her next appointment for her chart.

## 2023-10-25 ENCOUNTER — TELEPHONE (OUTPATIENT)
Dept: FAMILY MEDICINE CLINIC | Age: 73
End: 2023-10-25

## 2023-10-25 DIAGNOSIS — Z12.11 COLON CANCER SCREENING: Primary | ICD-10-CM

## 2023-10-25 NOTE — TELEPHONE ENCOUNTER
Patient said she did the cologuard test not the fit test. Please call patient back at [phone] this is her cell.

## 2023-10-25 NOTE — TELEPHONE ENCOUNTER
LMTCB  Dr. Rona Lynch received pt's result for FIT test - negative  He states this only tests for hidden blood & should consider doing cologuard - a more thorough test & tests for abnormal DNA

## 2023-11-03 RX ORDER — OMEPRAZOLE 40 MG/1
CAPSULE, DELAYED RELEASE ORAL
Qty: 90 CAPSULE | Refills: 2 | Status: SHIPPED | OUTPATIENT
Start: 2023-11-03

## 2023-11-03 NOTE — TELEPHONE ENCOUNTER
Future Appointments   Date Time Provider Department Center   12/6/2023 10:20 AM Felisa Quintana APRN [facility] SLEEP MED [facility] 10/19/2023

## 2023-11-06 NOTE — RESULT ENCOUNTER NOTE
Please call the patient. Her cologuard was in 2020. That test should be repeated every 3 years. The FIT test is not as thorough. I would recommend ordering the cologuard.     Pls advise me what she wants to go

## 2023-11-06 NOTE — TELEPHONE ENCOUNTER
Please call the cologuard people and see what they have on record about any testing on [facility] and when.    thanks

## 2023-11-07 ENCOUNTER — TELEPHONE (OUTPATIENT)
Dept: FAMILY MEDICINE CLINIC | Age: 73
End: 2023-11-07

## 2023-11-07 NOTE — TELEPHONE ENCOUNTER
Pt called to let Dr Elio Sandoval know that the Pepcid had been helping. She wants to know if she should continue to take it. Please advise.

## 2023-11-07 NOTE — TELEPHONE ENCOUNTER
If it is helping then I would continue to take it. It can be used kind of long term, along with a good diet to treat acid. Does she want a refill ? Local or mail-order?

## 2023-11-16 NOTE — TELEPHONE ENCOUNTER
Patient returned call and was advised of Dr. Ramirez Sole message. She doesn't need a refill. She has a few other questions (which she didn't state), but denied an appointment with another provider. She would like a call from Dr. Marquez Rodriguez before he is out of the office until January. Otherwise, she will talk to Dr. Marquez Rodriguez about this in January.

## 2023-11-30 ENCOUNTER — TELEPHONE (OUTPATIENT)
Dept: PULMONOLOGY | Age: 73
End: 2023-11-30

## 2023-11-30 NOTE — TELEPHONE ENCOUNTER
Pt called in stating that [facility] informed her that they would no longer be covered by her insurance. Pt stated that they had recommended her to try [employer] but pt has gone through [employer] before and they are no longer covered with her insurance and she did not have a good experience through their company. Pt has also used [employer] who stated they aren't covered by pt insurance. Pt asking for any other recommendations for DME companies that she could use to get her supplies for machine.     Ph. [phone]

## 2023-12-01 NOTE — TELEPHONE ENCOUNTER
Spoke with patient and she is agreeable to having order sent to [address] [place] [address].  Order sent. In the meantime patient will check with her insurance to see who is in network.

## 2023-12-04 ENCOUNTER — TELEPHONE (OUTPATIENT)
Dept: PULMONOLOGY | Age: 73
End: 2023-12-04

## 2023-12-04 NOTE — TELEPHONE ENCOUNTER
Left message for patient that she would need to keep appointment because it is her 31-90 day f/u to getting her new machine and it is an insurance requirement.

## 2023-12-12 ENCOUNTER — TELEPHONE (OUTPATIENT)
Dept: FAMILY MEDICINE CLINIC | Age: 73
End: 2023-12-12

## 2023-12-12 NOTE — TELEPHONE ENCOUNTER
Patient needs a refill on famotidine (PEPCID) 20 MG tablet . They need a 90 day supply.      Mail order or local pharmacy:   [facility] [address], [place] [phone] [address], [address] [facility] [address]  Phone: [phone]  Fax: [phone]       LOV: 10/19/2023    Future Appointments   Date Time Provider [address] [place] [address]   3/6/2024 10:20 AM TOM Tuttle SLEEP MED [facility]

## 2023-12-12 NOTE — TELEPHONE ENCOUNTER
Patient called and stated she received a cologuard in the mail and she wanted to know if it was sent in by our office? She states she just completed one and sent it in recently and now she is unsure if that was and official cologuard from our office.      Please advise

## 2023-12-12 NOTE — TELEPHONE ENCOUNTER
Patient stated that she completed a FIT test in August she thought it was from our office I advised her she completed it through an outside [address] her insurance ordered the test she will call [facility] to see if insurance will cover this test. And then she will call insurance then call me to let me know if this test can be completed

## 2023-12-14 NOTE — TELEPHONE ENCOUNTER
Patient called back and stated she spoke to her insurance and they informed her both tests are covered under her policy and she will be doing the official cologuard test now. She wanted to updated Lisha Gamino MA.

## 2023-12-15 RX ORDER — FAMOTIDINE 20 MG/1
20 TABLET, FILM COATED ORAL 2 TIMES DAILY
Qty: 180 TABLET | Refills: 1 | Status: SHIPPED | OUTPATIENT
Start: 2023-12-15

## 2023-12-15 NOTE — TELEPHONE ENCOUNTER
Patient called to see why her refill was not at her pharmacy. I explained it was an error and we will get it refilled for her. (This was sent to Dr. Cristy Marroquin by mistake)  Can we please send to someone that can take care of today?     10/19/2023 last ov  Future Appointments   Date Time Provider [address]   3/6/2024 10:20 AM TOM Meng SLEEP MED [facility]

## 2024-01-08 DIAGNOSIS — M79.7 FIBROMYALGIA: ICD-10-CM

## 2024-01-08 RX ORDER — DULOXETIN HYDROCHLORIDE 20 MG/1
CAPSULE, DELAYED RELEASE ORAL
Qty: 180 CAPSULE | Refills: 0 | Status: SHIPPED | OUTPATIENT
Start: 2024-01-08

## 2024-01-08 RX ORDER — GABAPENTIN 300 MG/1
CAPSULE ORAL
Qty: 540 CAPSULE | Refills: 2 | OUTPATIENT
Start: 2024-01-08 | End: 2024-02-07

## 2024-01-08 NOTE — TELEPHONE ENCOUNTER
Future Appointments   Date Time Provider Department Center   3/6/2024 10:20 AM Felisa Quintana APRN [facility] SLEEP MED [facility] 10/19/2023

## 2024-01-23 LAB — NONINV COLON CA DNA+OCC BLD SCRN STL QL: NEGATIVE

## 2024-01-24 ENCOUNTER — HOSPITAL ENCOUNTER (OUTPATIENT)
Dept: WOMENS IMAGING | Age: 74
Discharge: HOME | End: 2024-01-24
Payer: MEDICARE

## 2024-01-24 VITALS — HEIGHT: 62 IN | BODY MASS INDEX: 36.8 KG/M2 | WEIGHT: 200 LBS

## 2024-01-24 DIAGNOSIS — Z12.31 VISIT FOR SCREENING MAMMOGRAM: ICD-10-CM

## 2024-01-24 PROCEDURE — 77063 BREAST TOMOSYNTHESIS BI: CPT

## 2024-03-01 NOTE — PATIENT INSTRUCTIONS
[facility]  Fax:[phone]  Spec: [phone]  MO: [phone]     Increase the dose of the gabapentin to 3 times a day    Be mindful of the risk of falling    Get an eye exam      Personalized Preventive Plan for Himanshu Mercedes - 9/27/2022  Medicare offers a range of preventive health benefits. Some of the tests and screenings are paid in full while other may be subject to a deductible, co-insurance, and/or copay. Some of these benefits include a comprehensive review of your medical history including lifestyle, illnesses that may run in your family, and various assessments and screenings as appropriate. After reviewing your medical record and screening and assessments performed today your provider may have ordered immunizations, labs, imaging, and/or referrals for you. A list of these orders (if applicable) as well as your Preventive Care list are included within your After Visit Summary for your review. Other Preventive Recommendations:    A preventive eye exam performed by an eye specialist is recommended every 1-2 years to screen for glaucoma; cataracts, macular degeneration, and other eye disorders. A preventive dental visit is recommended every 6 months. Try to get at least 150 minutes of exercise per week or 10,000 steps per day on a pedometer . Order or download the FREE \"Exercise & Physical Activity: Your Everyday Guide\" from [employer]. Call 5-745.918.8035 or search [employer] online. You need 4823-7592 mg of calcium and 6660-7302 IU of vitamin D per day. It is possible to meet your calcium requirement with diet alone, but a vitamin D supplement is usually necessary to meet this goal.  When exposed to the sun, use a sunscreen that protects against both UVA and UVB radiation with an SPF of 30 or greater. Reapply every 2 to 3 hours or after sweating, drying off with a towel, or swimming. Always wear a seat belt when traveling in a car. Always wear a helmet when riding a bicycle or motorcycle.

## 2024-03-05 RX ORDER — DULOXETIN HYDROCHLORIDE 20 MG/1
CAPSULE, DELAYED RELEASE ORAL
Qty: 180 CAPSULE | Refills: 3 | Status: SHIPPED | OUTPATIENT
Start: 2024-03-05

## 2024-03-05 NOTE — TELEPHONE ENCOUNTER
Future Appointments   Date Time Provider Department Center   3/6/2024 10:20 AM Felisa Quintana APRN [facility] SLEEP [facility]     LOV Visit date not found

## 2024-03-06 NOTE — PROGRESS NOTES
Diagnosis: [x] CHELSI (G47.33) [] CSA (G47.31) [] Apnea (G47.30)   Length of Need: [x] 15 Months [] 99 Months [] Other:   Machine (EMORY!): [] Respironics Dream Station      Auto [] ResMed AirSense     Auto [] Other:     []  CPAP () [] Bilevel ()   Mode: [] Auto [] Spontaneous    Mode: [] Auto [] Spontaneous              Comfort Settings:      Humidifier: [] Heated ()        [x] Water chamber replacement ()/ 1 per 6 months        Mask:   [x] Nasal () /1 per 3 months [] Full Face () /1 per 3 months   [x] Patient choice -Size and fit mask [] Patient Choice - Size and fit mask   [] Dispense: [] Dispense:   [x] Headgear () / 1 per 3 months [] Headgear () / 1 per 3 months   [] Replacement Nasal Cushion ()/2 per month [] Interface Replacement ()/1 per month   [x] Replacement Nasal Pillows ()/2 per month         Tubing: [x] Heated ()/1 per 3 months    [] Standard ()/1 per 3 months [] Other:           Filters: [x] Non-disposable ()/1 per 6 months     [x] Ultra-Fine, Disposable ()/2 per month        Miscellaneous: [] Chin Strap ()/ 1 per 6 months [] O2 bleed-in:        LPM   [] Oxymetry on CPAP/Bilevel []  Other:         Start Order Date: 03/06/24    MEDICAL JUSTIFICATION:  I, the undersigned, certify that the above prescribed supplies are medically necessary for this patient’s wellbeing.  In my opinion, the supplies are both reasonable and necessary in reference to accepted standards of medicalpractice in treatment of this patient’s condition.    DAVID WINN NP    NPI: [id number]       Order Signed Date: 03/06/24  [facility]  Pulmonary, Sleep, and Critical Care    Pulmonary, Sleep, and Critical Care  [facility] [address]                          [address] [place] [address] [address]  [place] [address]                                    [place] [address]  Phone: [phone]    Fax:

## 2024-03-11 ENCOUNTER — TELEPHONE (OUTPATIENT)
Dept: FAMILY MEDICINE CLINIC | Age: 74
End: 2024-03-11

## 2024-03-11 NOTE — TELEPHONE ENCOUNTER
Patient is calling to ask if Dr. Otero can write her a letter to exempt her from jury duty due to health issues. She is supposed to be there April 1st. Please advise.

## 2024-03-13 NOTE — TELEPHONE ENCOUNTER
LM for pt  Need to know if she would like to stop by office to  or mail it.  I did forward it to her MyChart.

## 2024-03-21 DIAGNOSIS — M79.7 FIBROMYALGIA: ICD-10-CM

## 2024-03-21 RX ORDER — FAMOTIDINE 20 MG/1
20 TABLET, FILM COATED ORAL 2 TIMES DAILY
Qty: 180 TABLET | Refills: 3 | Status: SHIPPED | OUTPATIENT
Start: 2024-03-21

## 2024-03-21 RX ORDER — GABAPENTIN 300 MG/1
CAPSULE ORAL
Qty: 540 CAPSULE | Refills: 0 | Status: SHIPPED | OUTPATIENT
Start: 2024-03-21 | End: 2024-04-21

## 2024-03-21 NOTE — TELEPHONE ENCOUNTER
Future Appointments   Date Time Provider Department Center   3/27/2024 10:20 AM Castillo Otero DO [facility]   3/4/2025 10:00 AM Felisa Quintana APRN [facility] SLEEP MED [facility] 10/19/2023

## 2024-03-25 RX ORDER — FAMOTIDINE 20 MG/1
20 TABLET, FILM COATED ORAL 2 TIMES DAILY
Qty: 180 TABLET | Refills: 3 | Status: SHIPPED | OUTPATIENT
Start: 2024-03-25

## 2024-03-25 NOTE — TELEPHONE ENCOUNTER
LOV 10/19/2023    Future Appointments   Date Time Provider Department Center   3/27/2024 10:20 AM Castillo Otero DO [facility] [place] [facility]   3/4/2025 10:00 AM Felisa Quintana APRN [facility] SLEEP MED [facility]

## 2024-03-27 ENCOUNTER — OFFICE VISIT (OUTPATIENT)
Dept: FAMILY MEDICINE CLINIC | Age: 74
End: 2024-03-27

## 2024-03-27 VITALS
OXYGEN SATURATION: 97 % | HEIGHT: 62 IN | BODY MASS INDEX: 40.12 KG/M2 | DIASTOLIC BLOOD PRESSURE: 84 MMHG | WEIGHT: 218 LBS | SYSTOLIC BLOOD PRESSURE: 110 MMHG | RESPIRATION RATE: 16 BRPM | TEMPERATURE: 97.3 F | HEART RATE: 69 BPM

## 2024-03-27 DIAGNOSIS — F33.42 RECURRENT MAJOR DEPRESSIVE DISORDER, IN FULL REMISSION (HCC): ICD-10-CM

## 2024-03-27 DIAGNOSIS — M79.7 FIBROMYALGIA: ICD-10-CM

## 2024-03-27 DIAGNOSIS — Z00.00 MEDICARE ANNUAL WELLNESS VISIT, SUBSEQUENT: Primary | ICD-10-CM

## 2024-03-27 SDOH — ECONOMIC STABILITY: FOOD INSECURITY: WITHIN THE PAST 12 MONTHS, YOU WORRIED THAT YOUR FOOD WOULD RUN OUT BEFORE YOU GOT MONEY TO BUY MORE.: NEVER TRUE

## 2024-03-27 SDOH — ECONOMIC STABILITY: FOOD INSECURITY: WITHIN THE PAST 12 MONTHS, THE FOOD YOU BOUGHT JUST DIDN'T LAST AND YOU DIDN'T HAVE MONEY TO GET MORE.: NEVER TRUE

## 2024-03-27 SDOH — ECONOMIC STABILITY: INCOME INSECURITY: HOW HARD IS IT FOR YOU TO PAY FOR THE VERY BASICS LIKE FOOD, HOUSING, MEDICAL CARE, AND HEATING?: NOT HARD AT ALL

## 2024-03-27 ASSESSMENT — PATIENT HEALTH QUESTIONNAIRE - PHQ9
5. POOR APPETITE OR OVEREATING: NOT AT ALL
8. MOVING OR SPEAKING SO SLOWLY THAT OTHER PEOPLE COULD HAVE NOTICED. OR THE OPPOSITE, BEING SO FIGETY OR RESTLESS THAT YOU HAVE BEEN MOVING AROUND A LOT MORE THAN USUAL: NOT AT ALL
SUM OF ALL RESPONSES TO PHQ QUESTIONS 1-9: 1
10. IF YOU CHECKED OFF ANY PROBLEMS, HOW DIFFICULT HAVE THESE PROBLEMS MADE IT FOR YOU TO DO YOUR WORK, TAKE CARE OF THINGS AT HOME, OR GET ALONG WITH OTHER PEOPLE: NOT DIFFICULT AT ALL
9. THOUGHTS THAT YOU WOULD BE BETTER OFF DEAD, OR OF HURTING YOURSELF: NOT AT ALL
7. TROUBLE CONCENTRATING ON THINGS, SUCH AS READING THE NEWSPAPER OR WATCHING TELEVISION: NOT AT ALL
6. FEELING BAD ABOUT YOURSELF - OR THAT YOU ARE A FAILURE OR HAVE LET YOURSELF OR YOUR FAMILY DOWN: NOT AT ALL
SUM OF ALL RESPONSES TO PHQ QUESTIONS 1-9: 1
SUM OF ALL RESPONSES TO PHQ QUESTIONS 1-9: 1
1. LITTLE INTEREST OR PLEASURE IN DOING THINGS: NOT AT ALL
SUM OF ALL RESPONSES TO PHQ9 QUESTIONS 1 & 2: 1
2. FEELING DOWN, DEPRESSED OR HOPELESS: SEVERAL DAYS
SUM OF ALL RESPONSES TO PHQ QUESTIONS 1-9: 1
3. TROUBLE FALLING OR STAYING ASLEEP: NOT AT ALL
4. FEELING TIRED OR HAVING LITTLE ENERGY: NOT AT ALL

## 2024-03-27 NOTE — PATIENT INSTRUCTIONS
feeling in the chest.     Sweating.     Shortness of breath.     Pain, pressure, or a strange feeling in the back, neck, jaw, or upper belly or in one or both shoulders or arms.     Lightheadedness or sudden weakness.     A fast or irregular heartbeat.   After you call 911, the  may tell you to chew 1 adult-strength or 2 to 4 low-dose aspirin. Wait for an ambulance. Do not try to drive yourself.  Watch closely for changes in your health, and be sure to contact your doctor if you have any problems.  Where can you learn more?  Go to https://www.[employer].net/patientEd and enter F075 to learn more about \"A Healthy Heart: Care Instructions.\"  Current as of: June 24, 2023               Content Version: 14.0  © 7101-0496 [employer].   Care instructions adapted under license by [employer]. If you have questions about a medical condition or this instruction, always ask your healthcare professional. [employer] disclaims any warranty or liability for your use of this information.      Personalized Preventive Plan for Mireya Rodríguez - 3/27/2024  Medicare offers a range of preventive health benefits. Some of the tests and screenings are paid in full while other may be subject to a deductible, co-insurance, and/or copay.    Some of these benefits include a comprehensive review of your medical history including lifestyle, illnesses that may run in your family, and various assessments and screenings as appropriate.    After reviewing your medical record and screening and assessments performed today your provider may have ordered immunizations, labs, imaging, and/or referrals for you.  A list of these orders (if applicable) as well as your Preventive Care list are included within your After Visit Summary for your review.    Other Preventive Recommendations:    A preventive eye exam performed by an eye specialist is recommended every 1-2 years to screen for glaucoma; cataracts, macular

## 2024-03-27 NOTE — PROGRESS NOTES
Temporal   SpO2: 97%   Weight: 98.9 kg (218 lb)   Height: 1.585 m (5' 2.4\")      Body mass index is 39.36 kg/m².             Allergies   Allergen Reactions    Cortisone Acetate Other (See Comments)     agitation     Prior to Visit Medications    Medication Sig Taking? Authorizing Provider   hydrocortisone 2.5 % cream Apply topically Yes Henry Burch MD   famotidine (PEPCID) 20 MG tablet TAKE 1 TABLET BY MOUTH TWICE DAILY Yes Castillo Otero DO   gabapentin (NEURONTIN) 300 MG capsule TAKE 2 CAPSULES THREE TIMES DAILY FOR FIBROMYALGIA Yes Castillo Otero DO   DULoxetine (CYMBALTA) 20 MG extended release capsule TAKE 1 CAPSULE TWICE DAILY Yes Castillo Otero DO   omeprazole (PRILOSEC) 40 MG delayed release capsule TAKE 1 CAPSULE EVERY DAY Yes Castillo Otero DO   meloxicam (MOBIC) 15 MG tablet Take 1 tablet by mouth daily Yes Henry Burch MD   fluticasone (FLONASE) 50 MCG/ACT nasal spray USE 2 SPRAYS IN EACH NOSTRIL EVERY DAY Yes Castillo Otero DO   Multiple Vitamins-Minerals (THERAPEUTIC MULTIVITAMIN-MINERALS) tablet Take 1 tablet by mouth daily Yes Henry Burch MD       CareTeam (Including outside providers/suppliers regularly involved in providing care):   Patient Care Team:  Castillo Otero DO as PCP - General  Castillo Otero DO as PCP - Empaneled Provider  Alvaro Kenny MD as Surgeon (Orthopedic Surgery)  Megan Rodas PA-C as Physician Assistant (Orthopedic Surgery)  Joss Kay MD as Consulting Physician (Sleep Medicine Family Practice)  Kehrt, Tiffanie R, APRN - CNP as Nurse Practitioner (Nurse Practitioner)     Reviewed and updated this visit:  Tobacco  Allergies  Meds  Problems  Med Hx  Surg Hx  Soc Hx  Fam Hx

## 2024-05-01 ENCOUNTER — TELEPHONE (OUTPATIENT)
Dept: PULMONOLOGY | Age: 74
End: 2024-05-01

## 2024-05-01 NOTE — TELEPHONE ENCOUNTER
Patient called in - she said she spoke recently with Felisa pal: trouble w/nasal pillows irritating her face. She said you recommended masks, but she cannot remember what you recommended. The only thing I saw in her LOV was about liners.    Callback number [phone]

## 2024-05-01 NOTE — TELEPHONE ENCOUNTER
Mask liner for skin irritation. [employer] or [employer] mask liners. Could also consider any nasal cushion style mask that just lays under the nose or triangle around the nose mask that the cushion is made out of memory foam.

## 2024-05-22 DIAGNOSIS — M79.7 FIBROMYALGIA: ICD-10-CM

## 2024-05-22 RX ORDER — GABAPENTIN 300 MG/1
CAPSULE ORAL
Qty: 540 CAPSULE | Refills: 1 | Status: SHIPPED | OUTPATIENT
Start: 2024-05-22 | End: 2024-06-22

## 2024-05-22 RX ORDER — FLUTICASONE PROPIONATE 50 MCG
SPRAY, SUSPENSION (ML) NASAL
Qty: 48 G | Refills: 3 | Status: SHIPPED | OUTPATIENT
Start: 2024-05-22

## 2024-05-22 NOTE — TELEPHONE ENCOUNTER
Future Appointments   Date Time Provider Department Center   3/4/2025 10:00 AM Felisa Quintana APRN [facility] SLEEP MED [facility] 3/27/2024

## 2024-12-11 DIAGNOSIS — M79.7 FIBROMYALGIA: ICD-10-CM

## 2024-12-11 DIAGNOSIS — K21.9 GASTROESOPHAGEAL REFLUX DISEASE WITHOUT ESOPHAGITIS: Primary | ICD-10-CM

## 2024-12-11 RX ORDER — OMEPRAZOLE 40 MG/1
CAPSULE, DELAYED RELEASE ORAL
Qty: 90 CAPSULE | Refills: 3 | Status: SHIPPED | OUTPATIENT
Start: 2024-12-11

## 2024-12-11 RX ORDER — GABAPENTIN 300 MG/1
CAPSULE ORAL
Qty: 540 CAPSULE | Refills: 0 | Status: SHIPPED | OUTPATIENT
Start: 2024-12-11 | End: 2025-01-11

## 2024-12-11 NOTE — TELEPHONE ENCOUNTER
Future Appointments   Date Time Provider Department Center   3/4/2025 10:00 AM Felisa Quintana APRN [facility] SLEEP MED [facility] 3/27/2024

## 2025-01-20 ENCOUNTER — TELEPHONE (OUTPATIENT)
Dept: FAMILY MEDICINE CLINIC | Age: 75
End: 2025-01-20

## 2025-03-04 ENCOUNTER — TELEMEDICINE (OUTPATIENT)
Dept: PULMONOLOGY | Age: 75
End: 2025-03-04
Payer: MEDICARE

## 2025-03-04 DIAGNOSIS — G47.33 OBSTRUCTIVE SLEEP APNEA: Primary | ICD-10-CM

## 2025-03-04 DIAGNOSIS — E66.01 SEVERE OBESITY (BMI 35.0-39.9) WITH COMORBIDITY: ICD-10-CM

## 2025-03-04 PROCEDURE — G2211 COMPLEX E/M VISIT ADD ON: HCPCS | Performed by: NURSE PRACTITIONER

## 2025-03-04 PROCEDURE — 1090F PRES/ABSN URINE INCON ASSESS: CPT | Performed by: NURSE PRACTITIONER

## 2025-03-04 PROCEDURE — G8427 DOCREV CUR MEDS BY ELIG CLIN: HCPCS | Performed by: NURSE PRACTITIONER

## 2025-03-04 PROCEDURE — 1160F RVW MEDS BY RX/DR IN RCRD: CPT | Performed by: NURSE PRACTITIONER

## 2025-03-04 PROCEDURE — 1123F ACP DISCUSS/DSCN MKR DOCD: CPT | Performed by: NURSE PRACTITIONER

## 2025-03-04 PROCEDURE — 1159F MED LIST DOCD IN RCRD: CPT | Performed by: NURSE PRACTITIONER

## 2025-03-04 PROCEDURE — 3017F COLORECTAL CA SCREEN DOC REV: CPT | Performed by: NURSE PRACTITIONER

## 2025-03-04 PROCEDURE — 99214 OFFICE O/P EST MOD 30 MIN: CPT | Performed by: NURSE PRACTITIONER

## 2025-03-04 PROCEDURE — G8399 PT W/DXA RESULTS DOCUMENT: HCPCS | Performed by: NURSE PRACTITIONER

## 2025-03-04 ASSESSMENT — SLEEP AND FATIGUE QUESTIONNAIRES
HOW LIKELY ARE YOU TO NOD OFF OR FALL ASLEEP WHEN YOU ARE A PASSENGER IN A CAR FOR AN HOUR WITHOUT A BREAK: WOULD NEVER DOZE
HOW LIKELY ARE YOU TO NOD OFF OR FALL ASLEEP IN A CAR, WHILE STOPPED FOR A FEW MINUTES IN TRAFFIC: WOULD NEVER DOZE
HOW LIKELY ARE YOU TO NOD OFF OR FALL ASLEEP WHILE SITTING INACTIVE IN A PUBLIC PLACE: SLIGHT CHANCE OF DOZING
HOW LIKELY ARE YOU TO NOD OFF OR FALL ASLEEP WHILE SITTING QUIETLY AFTER LUNCH WITHOUT ALCOHOL: MODERATE CHANCE OF DOZING
HOW LIKELY ARE YOU TO NOD OFF OR FALL ASLEEP WHILE WATCHING TV: WOULD NEVER DOZE
HOW LIKELY ARE YOU TO NOD OFF OR FALL ASLEEP WHILE LYING DOWN TO REST IN THE AFTERNOON WHEN CIRCUMSTANCES PERMIT: WOULD NEVER DOZE
ESS TOTAL SCORE: 4
HOW LIKELY ARE YOU TO NOD OFF OR FALL ASLEEP WHILE SITTING AND TALKING TO SOMEONE: SLIGHT CHANCE OF DOZING
HOW LIKELY ARE YOU TO NOD OFF OR FALL ASLEEP WHILE SITTING AND READING: WOULD NEVER DOZE

## 2025-03-04 NOTE — PROGRESS NOTES
[id number]  Provider was located at Other:    [facility], [place], [place]  Confirm you are appropriately licensed, registered, or certified to deliver care in the state where the patient is located as indicated above. If you are not or unsure, please re-schedule the visit: Yes, I confirm.       Electronically signed by TOM Lau on 3/4/2025 at 10:19 AM

## 2025-03-04 NOTE — PROGRESS NOTES
Diagnosis: [x] CHELSI (G47.33) [] CSA (G47.31) [] Apnea (G47.30)   Length of Need: [x] 15 Months [] 99 Months [] Other:   Machine (EMORY!): [] Respironics Dream Station      Auto [] ResMed AirSense     Auto [] Other:     []  CPAP () [] Bilevel ()   Mode: [] Auto [] Spontaneous    Mode: [] Auto [] Spontaneous              Comfort Settings:      Humidifier: [] Heated ()        [x] Water chamber replacement ()/ 1 per 6 months        Mask:   [x] Nasal () /1 per 3 months [] Full Face () /1 per 3 months   [x] Patient choice -Size and fit mask [] Patient Choice - Size and fit mask   [] Dispense: [] Dispense:   [x] Headgear () / 1 per 3 months [] Headgear () / 1 per 3 months   [x] Replacement Nasal Cushion ()/2 per month [] Interface Replacement ()/1 per month   [x] Replacement Nasal Pillows ()/2 per month         Tubing: [x] Heated ()/1 per 3 months    [] Standard ()/1 per 3 months [] Other:           Filters: [x] Non-disposable ()/1 per 6 months     [x] Ultra-Fine, Disposable ()/2 per month        Miscellaneous: [x] Chin Strap ()/ 1 per 6 months [] O2 bleed-in:        LPM   [] Oxymetry on CPAP/Bilevel []  Other:         Start Order Date: 03/04/25    MEDICAL JUSTIFICATION:  I, the undersigned, certify that the above prescribed supplies are medically necessary for this patient’s wellbeing.  In my opinion, the supplies are both reasonable and necessary in reference to accepted standards of medicalpractice in treatment of this patient’s condition.    DAVID WINN NP    NPI: [id number]       Order Signed Date: 03/04/25  [facility]  Pulmonary, Sleep, and Critical Care    Pulmonary, Sleep, and Critical Care  [facility] [address]                          [address] [place] [address], [address]  [place] [address]                                    [place] [address]  Phone: [phone]    Fax:

## 2025-03-04 NOTE — ASSESSMENT & PLAN NOTE
Chronic-Stable: Reviewed and analyzed results of physiologic download from patient's machine and reviewed with patient.  Supplies and parts as needed for her machine.  These are medically necessary.  Limit caffeine use after 3pm. Based on the analyzed data will change following settings: Ramp pressure increased to 9, P min to 12, P max to 20, tube temp from auto to 80 degrees.  Changes sent via machine modem. Will trial pressure increase for comfort. Stable on her machine, getting benefit from the use, and having minimal side effects. Encouraged her to contact her DME if she would like to try a different style of mask. Will see her back in 1 year. Encouraged her to contact the office with any questions or concerns.

## 2025-03-05 ENCOUNTER — HOSPITAL ENCOUNTER (OUTPATIENT)
Dept: WOMENS IMAGING | Age: 75
Discharge: HOME | End: 2025-03-05
Payer: MEDICARE

## 2025-03-05 VITALS — HEIGHT: 62 IN | WEIGHT: 190 LBS | BODY MASS INDEX: 34.96 KG/M2

## 2025-03-05 DIAGNOSIS — Z12.31 OTHER SCREENING MAMMOGRAM: ICD-10-CM

## 2025-03-05 PROCEDURE — 77063 BREAST TOMOSYNTHESIS BI: CPT

## 2025-03-17 DIAGNOSIS — M79.7 FIBROMYALGIA: ICD-10-CM

## 2025-03-17 RX ORDER — GABAPENTIN 300 MG/1
CAPSULE ORAL
Qty: 540 CAPSULE | Refills: 0 | Status: SHIPPED | OUTPATIENT
Start: 2025-03-17 | End: 2025-04-17

## 2025-03-17 RX ORDER — DULOXETIN HYDROCHLORIDE 20 MG/1
20 CAPSULE, DELAYED RELEASE ORAL 2 TIMES DAILY
Qty: 180 CAPSULE | Refills: 0 | Status: SHIPPED | OUTPATIENT
Start: 2025-03-17

## 2025-03-17 NOTE — TELEPHONE ENCOUNTER
Future Appointments   Date Time Provider Department Center   3/3/2026 10:00 AM Felisa Quintana APRN [facility] SLEEP MED [facility] 3/27/2024

## 2025-04-09 ENCOUNTER — OFFICE VISIT (OUTPATIENT)
Dept: FAMILY MEDICINE CLINIC | Age: 75
End: 2025-04-09

## 2025-04-09 VITALS
HEIGHT: 62 IN | RESPIRATION RATE: 16 BRPM | HEART RATE: 75 BPM | OXYGEN SATURATION: 96 % | DIASTOLIC BLOOD PRESSURE: 82 MMHG | TEMPERATURE: 97.1 F | WEIGHT: 218 LBS | BODY MASS INDEX: 40.12 KG/M2 | SYSTOLIC BLOOD PRESSURE: 120 MMHG

## 2025-04-09 DIAGNOSIS — R25.1 TREMOR: ICD-10-CM

## 2025-04-09 DIAGNOSIS — F33.42 RECURRENT MAJOR DEPRESSIVE DISORDER, IN FULL REMISSION: ICD-10-CM

## 2025-04-09 DIAGNOSIS — M79.7 FIBROMYALGIA: ICD-10-CM

## 2025-04-09 DIAGNOSIS — R73.01 ELEVATED FASTING BLOOD SUGAR: ICD-10-CM

## 2025-04-09 DIAGNOSIS — Z00.00 MEDICARE ANNUAL WELLNESS VISIT, SUBSEQUENT: Primary | ICD-10-CM

## 2025-04-09 DIAGNOSIS — E78.00 ELEVATED CHOLESTEROL: ICD-10-CM

## 2025-04-09 LAB
ALBUMIN SERPL-MCNC: 4.4 G/DL (ref 3.4–5)
ALBUMIN/GLOB SERPL: 1.8 {RATIO} (ref 1.1–2.2)
ALP SERPL-CCNC: 77 U/L (ref 40–129)
ALT SERPL-CCNC: 28 U/L (ref 10–40)
ANION GAP SERPL CALCULATED.3IONS-SCNC: 10 MMOL/L (ref 3–16)
AST SERPL-CCNC: 25 U/L (ref 15–37)
BASOPHILS # BLD: 0.1 K/UL (ref 0–0.2)
BASOPHILS NFR BLD: 1.3 %
BILIRUB SERPL-MCNC: 0.5 MG/DL (ref 0–1)
BUN SERPL-MCNC: 16 MG/DL (ref 7–20)
CALCIUM SERPL-MCNC: 9.4 MG/DL (ref 8.3–10.6)
CHLORIDE SERPL-SCNC: 109 MMOL/L (ref 99–110)
CHOLEST SERPL-MCNC: 252 MG/DL (ref 0–199)
CO2 SERPL-SCNC: 23 MMOL/L (ref 21–32)
CREAT SERPL-MCNC: 0.7 MG/DL (ref 0.6–1.2)
DEPRECATED RDW RBC AUTO: 14.2 % (ref 12.4–15.4)
EOSINOPHIL # BLD: 0.2 K/UL (ref 0–0.6)
EOSINOPHIL NFR BLD: 3.4 %
GFR SERPLBLD CREATININE-BSD FMLA CKD-EPI: >90 ML/MIN/{1.73_M2}
GLUCOSE SERPL-MCNC: 106 MG/DL (ref 70–99)
HCT VFR BLD AUTO: 40.7 % (ref 36–48)
HDLC SERPL-MCNC: 80 MG/DL (ref 40–60)
HGB BLD-MCNC: 13.3 G/DL (ref 12–16)
LDLC SERPL CALC-MCNC: 152 MG/DL
LYMPHOCYTES # BLD: 1.4 K/UL (ref 1–5.1)
LYMPHOCYTES NFR BLD: 29 %
MAGNESIUM SERPL-MCNC: 2.23 MG/DL (ref 1.8–2.4)
MCH RBC QN AUTO: 28.3 PG (ref 26–34)
MCHC RBC AUTO-ENTMCNC: 32.8 G/DL (ref 31–36)
MCV RBC AUTO: 86.3 FL (ref 80–100)
MONOCYTES # BLD: 0.4 K/UL (ref 0–1.3)
MONOCYTES NFR BLD: 8.7 %
NEUTROPHILS # BLD: 2.7 K/UL (ref 1.7–7.7)
NEUTROPHILS NFR BLD: 57.6 %
PLATELET # BLD AUTO: 316 K/UL (ref 135–450)
PMV BLD AUTO: 8.3 FL (ref 5–10.5)
POTASSIUM SERPL-SCNC: 4.4 MMOL/L (ref 3.5–5.1)
PROT SERPL-MCNC: 6.8 G/DL (ref 6.4–8.2)
RBC # BLD AUTO: 4.71 M/UL (ref 4–5.2)
SODIUM SERPL-SCNC: 142 MMOL/L (ref 136–145)
T4 FREE SERPL-MCNC: 1 NG/DL (ref 0.9–1.8)
TRIGL SERPL-MCNC: 101 MG/DL (ref 0–150)
TSH SERPL DL<=0.005 MIU/L-ACNC: 0.94 UIU/ML (ref 0.27–4.2)
VLDLC SERPL CALC-MCNC: 20 MG/DL
WBC # BLD AUTO: 4.7 K/UL (ref 4–11)

## 2025-04-09 SDOH — ECONOMIC STABILITY: FOOD INSECURITY: WITHIN THE PAST 12 MONTHS, THE FOOD YOU BOUGHT JUST DIDN'T LAST AND YOU DIDN'T HAVE MONEY TO GET MORE.: NEVER TRUE

## 2025-04-09 SDOH — ECONOMIC STABILITY: FOOD INSECURITY: WITHIN THE PAST 12 MONTHS, YOU WORRIED THAT YOUR FOOD WOULD RUN OUT BEFORE YOU GOT MONEY TO BUY MORE.: NEVER TRUE

## 2025-04-09 ASSESSMENT — PATIENT HEALTH QUESTIONNAIRE - PHQ9
10. IF YOU CHECKED OFF ANY PROBLEMS, HOW DIFFICULT HAVE THESE PROBLEMS MADE IT FOR YOU TO DO YOUR WORK, TAKE CARE OF THINGS AT HOME, OR GET ALONG WITH OTHER PEOPLE: NOT DIFFICULT AT ALL
SUM OF ALL RESPONSES TO PHQ QUESTIONS 1-9: 2
SUM OF ALL RESPONSES TO PHQ QUESTIONS 1-9: 2
9. THOUGHTS THAT YOU WOULD BE BETTER OFF DEAD, OR OF HURTING YOURSELF: NOT AT ALL
SUM OF ALL RESPONSES TO PHQ QUESTIONS 1-9: 2
2. FEELING DOWN, DEPRESSED OR HOPELESS: SEVERAL DAYS
4. FEELING TIRED OR HAVING LITTLE ENERGY: NOT AT ALL
3. TROUBLE FALLING OR STAYING ASLEEP: SEVERAL DAYS
SUM OF ALL RESPONSES TO PHQ QUESTIONS 1-9: 2
7. TROUBLE CONCENTRATING ON THINGS, SUCH AS READING THE NEWSPAPER OR WATCHING TELEVISION: NOT AT ALL
8. MOVING OR SPEAKING SO SLOWLY THAT OTHER PEOPLE COULD HAVE NOTICED. OR THE OPPOSITE, BEING SO FIGETY OR RESTLESS THAT YOU HAVE BEEN MOVING AROUND A LOT MORE THAN USUAL: NOT AT ALL
6. FEELING BAD ABOUT YOURSELF - OR THAT YOU ARE A FAILURE OR HAVE LET YOURSELF OR YOUR FAMILY DOWN: NOT AT ALL
1. LITTLE INTEREST OR PLEASURE IN DOING THINGS: NOT AT ALL
5. POOR APPETITE OR OVEREATING: NOT AT ALL

## 2025-04-09 ASSESSMENT — ENCOUNTER SYMPTOMS: SHORTNESS OF BREATH: 0

## 2025-04-09 NOTE — PATIENT INSTRUCTIONS
Consider decaf coffee or a mix for the stomach sx    Continue the current medications      Call me in 3-4 days for the blood results.           Learning About Vision Tests  What are vision tests?     The four most common vision tests are visual acuity tests, refraction, visual field tests, and color vision tests.  Visual acuity (sharpness) tests  These tests are used:  To see if you need glasses or contact lenses.  To monitor an eye problem.  To check an eye injury.  Visual acuity tests are done as part of routine exams. You may also have this test when you get your 's license or apply for some types of jobs.  Visual field tests  These tests are used:  To check for vision loss in any area of your range of vision.  To screen for certain eye diseases.  To look for nerve damage after a stroke, head injury, or other problem that could reduce blood flow to the brain.  Refraction and color tests  A refraction test is done to find the right prescription for glasses and contact lenses.  A color vision test is done to check for color blindness.  Color vision is often tested as part of a routine exam. You may also have this test when you apply for a job where recognizing different colors is important, such as , electronics, or the .  How are vision tests done?  Visual acuity test   You cover one eye at a time.  You read aloud from a wall chart across the room.  You read aloud from a small card that you hold in your hand.  Refraction   You look into a special device.  The device puts lenses of different strengths in front of each eye to see how strong your glasses or contact lenses need to be.  Visual field tests   Your doctor may have you look through special machines.  Or your doctor may simply have you stare straight ahead while they move a finger into and out of your field of vision.  Color vision test   You look at pieces of printed test patterns in various colors. You say what number or symbol

## 2025-04-09 NOTE — PROGRESS NOTES
Allergies  Meds  Problems           Call in 2-3 days for the lab results.  Continue the current medication program.

## 2025-04-10 LAB
EST. AVERAGE GLUCOSE BLD GHB EST-MCNC: 111.2 MG/DL
HBA1C MFR BLD: 5.5 %

## 2025-05-18 ENCOUNTER — RESULTS FOLLOW-UP (OUTPATIENT)
Dept: FAMILY MEDICINE CLINIC | Age: 75
End: 2025-05-18

## 2025-05-18 NOTE — RESULT ENCOUNTER NOTE
Please call the patient and tell her her labs from her last visit showed no diabetes.  Kidney function is good.  Cholesterol is significantly which is above the range which we recommend anticholesterol medicines to slow or limit the risk of plaque formation.  Unless you have had side effects or significant problems from cholesterol medicines in the past I would recommend we start you on a low-dose statin medicine.    Good diet and walking or some physical activity 4 times a week is that also.  An occasional side effect of cholesterol meds as achy muscles.  This is temporary and goes away if the medicine is stopped.  I would begin the medicine and then follow-up with the blood test in 3 or 4 months.    Medical assistant, please advise me of the patient's decision about taking cholesterol-lowering medicine (statin).

## 2025-06-03 DIAGNOSIS — M79.7 FIBROMYALGIA: ICD-10-CM

## 2025-06-06 RX ORDER — FLUTICASONE PROPIONATE 50 MCG
2 SPRAY, SUSPENSION (ML) NASAL DAILY
Qty: 48 G | Refills: 3 | Status: SHIPPED | OUTPATIENT
Start: 2025-06-06

## 2025-06-06 RX ORDER — DULOXETIN HYDROCHLORIDE 20 MG/1
20 CAPSULE, DELAYED RELEASE ORAL 2 TIMES DAILY
Qty: 180 CAPSULE | Refills: 1 | Status: SHIPPED | OUTPATIENT
Start: 2025-06-06

## 2025-06-06 RX ORDER — GABAPENTIN 300 MG/1
CAPSULE ORAL
Qty: 540 CAPSULE | Refills: 1 | Status: SHIPPED | OUTPATIENT
Start: 2025-06-06 | End: 2025-12-06

## 2025-06-06 NOTE — TELEPHONE ENCOUNTER
Pt called. She needs the following medications refilled and sent to [facility].     Gabapentin  Duloxetine   Flonase    Last ov 4/9/2025    Future Appointments   Date Time Provider Department Center   3/3/2026 10:00 AM Felisa Quintana APRN [facility]

## 2025-06-09 RX ORDER — FAMOTIDINE 20 MG/1
20 TABLET, FILM COATED ORAL 2 TIMES DAILY
Qty: 180 TABLET | Refills: 3 | Status: SHIPPED | OUTPATIENT
Start: 2025-06-09

## 2025-06-09 NOTE — TELEPHONE ENCOUNTER
Lov 4/9/2025    Future Appointments   Date Time Provider Department Center   3/3/2026 10:00 AM Felisa Quintana APRN [facility]